# Patient Record
Sex: FEMALE | Race: BLACK OR AFRICAN AMERICAN | Employment: OTHER | URBAN - METROPOLITAN AREA
[De-identification: names, ages, dates, MRNs, and addresses within clinical notes are randomized per-mention and may not be internally consistent; named-entity substitution may affect disease eponyms.]

---

## 2017-03-13 ENCOUNTER — TELEPHONE (OUTPATIENT)
Dept: FAMILY MEDICINE CLINIC | Age: 67
End: 2017-03-13

## 2017-03-13 DIAGNOSIS — M79.605 BILATERAL LEG PAIN: Primary | ICD-10-CM

## 2017-03-13 DIAGNOSIS — M79.604 BILATERAL LEG PAIN: Primary | ICD-10-CM

## 2017-03-29 ENCOUNTER — OFFICE VISIT (OUTPATIENT)
Dept: FAMILY MEDICINE CLINIC | Age: 67
End: 2017-03-29

## 2017-03-29 VITALS
HEIGHT: 65 IN | RESPIRATION RATE: 18 BRPM | TEMPERATURE: 98.6 F | WEIGHT: 217 LBS | SYSTOLIC BLOOD PRESSURE: 112 MMHG | HEART RATE: 95 BPM | BODY MASS INDEX: 36.15 KG/M2 | DIASTOLIC BLOOD PRESSURE: 71 MMHG | OXYGEN SATURATION: 96 %

## 2017-03-29 DIAGNOSIS — E11.9 CONTROLLED TYPE 2 DIABETES MELLITUS WITHOUT COMPLICATION, WITHOUT LONG-TERM CURRENT USE OF INSULIN (HCC): ICD-10-CM

## 2017-03-29 DIAGNOSIS — F51.01 PRIMARY INSOMNIA: ICD-10-CM

## 2017-03-29 DIAGNOSIS — I10 ESSENTIAL HYPERTENSION: ICD-10-CM

## 2017-03-29 DIAGNOSIS — M54.50 BILATERAL LOW BACK PAIN WITHOUT SCIATICA, UNSPECIFIED CHRONICITY: ICD-10-CM

## 2017-03-29 DIAGNOSIS — M19.90 ARTHRITIS: ICD-10-CM

## 2017-03-29 DIAGNOSIS — G89.29 CHRONIC PAIN OF BOTH KNEES: Primary | ICD-10-CM

## 2017-03-29 DIAGNOSIS — M25.562 CHRONIC PAIN OF BOTH KNEES: Primary | ICD-10-CM

## 2017-03-29 DIAGNOSIS — M25.561 CHRONIC PAIN OF BOTH KNEES: Primary | ICD-10-CM

## 2017-03-29 RX ORDER — CLONAZEPAM 1 MG/1
0.5 TABLET ORAL
Qty: 30 TAB | Refills: 0 | Status: SHIPPED | OUTPATIENT
Start: 2017-03-29 | End: 2017-03-29

## 2017-03-29 RX ORDER — PREDNISONE 10 MG/1
TABLET ORAL
Qty: 20 TAB | Refills: 0 | Status: SHIPPED | OUTPATIENT
Start: 2017-03-29 | End: 2017-03-29 | Stop reason: SDUPTHER

## 2017-03-29 RX ORDER — METFORMIN HYDROCHLORIDE 500 MG/1
TABLET ORAL
Qty: 90 TAB | Refills: 3 | Status: SHIPPED | OUTPATIENT
Start: 2017-03-29 | End: 2018-05-23 | Stop reason: SDUPTHER

## 2017-03-29 RX ORDER — PREDNISONE 10 MG/1
TABLET ORAL
Qty: 20 TAB | Refills: 0 | Status: SHIPPED | OUTPATIENT
Start: 2017-03-29 | End: 2017-11-03 | Stop reason: ALTCHOICE

## 2017-03-29 RX ORDER — LIDOCAINE 50 MG/G
PATCH TOPICAL
Qty: 30 EACH | Refills: 3 | Status: SHIPPED | OUTPATIENT
Start: 2017-03-29 | End: 2018-03-13

## 2017-03-29 RX ORDER — LISINOPRIL 10 MG/1
TABLET ORAL
Qty: 90 TAB | Refills: 1 | Status: SHIPPED | OUTPATIENT
Start: 2017-03-29 | End: 2018-01-02 | Stop reason: SDUPTHER

## 2017-03-29 RX ORDER — TRIAMTERENE AND HYDROCHLOROTHIAZIDE 37.5; 25 MG/1; MG/1
1 CAPSULE ORAL DAILY
Qty: 90 CAP | Refills: 1 | Status: SHIPPED | OUTPATIENT
Start: 2017-03-29 | End: 2018-01-02 | Stop reason: SDUPTHER

## 2017-03-29 RX ORDER — GABAPENTIN 300 MG/1
300 CAPSULE ORAL 2 TIMES DAILY
Qty: 60 CAP | Refills: 3 | Status: SHIPPED | OUTPATIENT
Start: 2017-03-29 | End: 2017-06-08 | Stop reason: SDUPTHER

## 2017-03-29 RX ORDER — DICLOFENAC SODIUM 75 MG/1
75 TABLET, DELAYED RELEASE ORAL 2 TIMES DAILY
Qty: 60 TAB | Refills: 3 | Status: SHIPPED | OUTPATIENT
Start: 2017-03-29 | End: 2017-06-08 | Stop reason: SDUPTHER

## 2017-03-29 NOTE — MR AVS SNAPSHOT
Visit Information Date & Time Provider Department Dept. Phone Encounter #  
 3/29/2017 10:45 AM Tapan Marc MD [facility] [address] [phone] [id number] Follow-up Instructions Return in about 3 months (around 6/29/2017). Upcoming Health Maintenance Date Due  
 EYE EXAM RETINAL OR DILATED Q1 5/28/1960 FOBT Q 1 YEAR AGE 50-75 11/30/2012 LIPID PANEL Q1 11/20/2016 FOOT EXAM Q1 4/29/2017 MICROALBUMIN Q1 4/29/2017 GLAUCOMA SCREENING Q2Y 5/29/2017 HEMOGLOBIN A1C Q6M 5/14/2017 MEDICARE YEARLY EXAM 7/28/2017 BREAST CANCER SCRN MAMMOGRAM 12/19/2018 DTaP/Tdap/Td series (2 - Td) 6/25/2023 Allergies as of 3/29/2017  Review Complete On: 3/29/2017 By: Tapan Marc MD  
  
 Severity Noted Reaction Type Reactions Codeine Medium 05/27/2015    Other (comments) \"Didn't like the way it made me feel\" Lipitor [Atorvastatin] Medium 05/27/2015    Other (comments)  
 cramps Pcn [Penicillins] Medium 05/27/2015    Itching Current Immunizations  Reviewed on 11/23/2015 Name Date Influenza Vaccine 11/20/2015, 11/13/2013 Influenza Vaccine Rajesh Bender) 11/14/2016 Not reviewed this visit You Were Diagnosed With   
  
 Codes Comments Chronic pain of both knees    -  Primary ICD-10-CM: M25.561, M25.562, G89.29 ICD-9-CM: 719.46, 338.29 Primary insomnia     ICD-10-CM: F51.01 
ICD-9-CM: 307.42 Essential hypertension     ICD-10-CM: I10 
ICD-9-CM: 401.9 Bilateral low back pain without sciatica, unspecified chronicity     ICD-10-CM: M54.5 ICD-9-CM: 724.2 Arthritis     ICD-10-CM: M19.90 ICD-9-CM: 716.90 Controlled type 2 diabetes mellitus without complication, without long-term current use of insulin ([facility]     ICD-10-CM: E11.9 ICD-9-CM: 250.00 Vitals BP Pulse Temp Resp Height(growth percentile) Weight(growth percentile) 112/71 95 98.6 °F (37 °C) (Oral) 18 5' 5\" (1.651 m) 217 lb (98.4 kg) SpO2 BMI OB Status Smoking Status 96% 36.11 kg/m2 Postmenopausal Former Smoker BMI and BSA Data Body Mass Index Body Surface Area  
 36.11 kg/m 2 2.12 m 2 Preferred Pharmacy Pharmacy Name Phone [place], [place] - [address] [address] [place] [address] [phone] Your Updated Medication List  
  
   
This list is accurate as of: 3/29/17 11:41 AM.  Always use your most recent med list.  
  
  
  
  
 aspirin 81 mg chewable tablet Take 81 mg by mouth daily. clobetasol 0.05 % external solution Commonly known as:  Aquilino Congress Apply small amount to scalp once daily  
  
 diclofenac EC 75 mg EC tablet Commonly known as:  VOLTAREN Take 1 Tab by mouth two (2) times a day.  
  
 gabapentin 300 mg capsule Commonly known as:  NEURONTIN Take 1 Cap by mouth two (2) times a day. lidocaine 5 % Commonly known as:  Bard Budd Apply patch to the affected area for 12 hours a day and remove for 12 hours a day. lisinopril 10 mg tablet Commonly known as:  PRINIVIL, ZESTRIL  
TAKE 1 TABLET EVERY DAY  
  
 metFORMIN 500 mg tablet Commonly known as:  GLUCOPHAGE  
TAKE 1 TABLET DAILY WITH MEALS MILK OF MAGNESIA PO  
  
 OTHER  
lidocain 5% and gabapentin 5%  
  
 predniSONE 10 mg tablet Commonly known as:  Maximiliano Moment Take 4 tab ea day for 2 days, then 3 tab ea day for 2 days , then 2 tab ea day for 2 days, then 1 tab ea day for 2 days  
  
 triamterene-hydroCHLOROthiazide 37.5-25 mg per capsule Commonly known as:  Lisa Сергей Take 1 Cap by mouth daily. VITAMIN D3 2,000 unit Cap capsule Generic drug:  Cholecalciferol (Vitamin D3) Take 1 Cap by mouth daily. Prescriptions Printed Refills  
 predniSONE (DELTASONE) 10 mg tablet 0 Sig: Take 4 tab ea day for 2 days, then 3 tab ea day for 2 days , then 2 tab ea day for 2 days, then 1 tab ea day for 2 days Class: Print Prescriptions Sent to Pharmacy Refills  
 triamterene-hydroCHLOROthiazide (DYAZIDE) 37.5-25 mg per capsule 1 Sig: Take 1 Cap by mouth daily. Class: Normal  
 Pharmacy: [address] [place] [address] Ph #: [phone] Route: Oral  
 diclofenac EC (VOLTAREN) 75 mg EC tablet 3 Sig: Take 1 Tab by mouth two (2) times a day. Class: Normal  
 Pharmacy: [address] [place] [address] Ph #: [phone] Route: Oral  
 gabapentin (NEURONTIN) 300 mg capsule 3 Sig: Take 1 Cap by mouth two (2) times a day. Class: Normal  
 Pharmacy: [address] [place] [address] Ph #: [phone] Route: Oral  
 lidocaine (LIDODERM) 5 % 3 Sig: Apply patch to the affected area for 12 hours a day and remove for 12 hours a day. Class: Normal  
 Pharmacy: [address] [place] [address] Ph #: [phone]  
 lisinopril (PRINIVIL, ZESTRIL) 10 mg tablet 1 Sig: TAKE 1 TABLET EVERY DAY Class: Normal  
 Pharmacy: [address] [place] [address] Ph #: [phone]  
 metFORMIN (GLUCOPHAGE) 500 mg tablet 3 Sig: TAKE 1 TABLET DAILY WITH MEALS Class: Normal  
 Pharmacy: [address] [place] [address] Ph #: [phone] We Performed the Following REFERRAL TO ORTHOPEDICS [EOO821 Custom] Comments:  
 Bilateral mod OA and c/o constant pain, eval and treat Follow-up Instructions Return in about 3 months (around 6/29/2017). Referral Information Referral ID Referred By Referred To  
  
 [id number] Juliana Pollock [address] Phone: [phone] Fax: [phone] Visits Status Start Date End Date 1 New Request 3/29/17 3/29/18  If your referral has a status of pending review or denied, additional information will be sent to support the outcome of this decision. Patient Instructions Patellofemoral Pain Syndrome (Runner's Knee): Exercises Your Care Instructions Here are some examples of typical rehabilitation exercises for your condition. Start each exercise slowly. Ease off the exercise if you start to have pain. Your doctor or physical therapist will tell you when you can start these exercises and which ones will work best for you. How to do the exercises Calf wall stretch 1. Stand facing a wall with your hands on the wall at about eye level. Put your affected leg about a step behind your other leg. 2. Keeping your back leg straight and your back heel on the floor, bend your front knee and gently bring your hip and chest toward the wall until you feel a stretch in the calf of your back leg. 3. Hold the stretch for at least 15 to 30 seconds. 4. Repeat 2 to 4 times. 5. Repeat steps 1 through 4, but this time keep your back knee bent. Quadriceps stretch 1. If you are not steady on your feet, hold on to a chair, counter, or wall. 2. Bend your affected leg, and reach behind you to grab the front of your foot or ankle with the hand on the same side. For example, if you are stretching your right leg, use your right hand. 3. Keeping your knees next to each other, pull your foot toward your buttock until you feel a gentle stretch across the front of your hip and down the front of your thigh. Your knee should be pointed directly to the ground, and not out to the side. 4. Hold the stretch for at least 15 to 30 seconds. 5. Repeat 2 to 4 times. Hamstring wall stretch 1. Lie on your back in a doorway, with your good leg through the open door. 2. Slide your affected leg up the wall to straighten your knee. You should feel a gentle stretch down the back of your leg. ¨ Do not arch your back. ¨ Do not bend either knee. ¨ Keep one heel touching the floor and the other heel touching the wall. Do not point your toes. 3. Hold the stretch for at least 1 minute. Then over time, try to lengthen the time you hold the stretch to as long as 6 minutes. 4. Repeat 2 to 4 times. If you do not have a place to do this exercise in a doorway, there is another way to do it: 1. Lie on your back, and bend your affected leg. 2. Loop a towel under the ball and toes of that foot, and hold the ends of the towel in your hands. 3. Straighten your knee, and slowly pull back on the towel. You should feel a gentle stretch down the back of your leg. 4. Hold the stretch for at least 15 to 30 seconds. Or even better, hold the stretch for 1 minute if you can. 5. Repeat 2 to 4 times. [employer] 1. Sit with your affected leg straight and supported on the floor or a firm bed. Place a small, rolled-up towel under your affected knee. Your other leg should be bent, with that foot flat on the floor. 2. Tighten the thigh muscles of your affected leg by pressing the back of your knee down into the towel. 3. Hold for about 6 seconds, then rest for up to 10 seconds. 4. Repeat 8 to 12 times. Straight-leg raises to the front 1. Lie on your back with your good knee bent so that your foot rests flat on the floor. Your affected leg should be straight. Make sure that your low back has a normal curve. You should be able to slip your hand in between the floor and the small of your back, with your palm touching the floor and your back touching the back of your hand. 2. Tighten the thigh muscles in your affected leg by pressing the back of your knee flat down to the floor. Hold your knee straight. 3. Keeping the thigh muscles tight and your leg straight, lift your affected leg up so that your heel is about 12 inches off the floor. 4. Hold for about 6 seconds, then lower your leg slowly. Rest for up to 10 seconds between repetitions. 5. Repeat 8 to 12 times. Straight-leg raises to the back 1. Lie on your stomach, and lift your leg straight up behind you (toward the ceiling). 2. Lift your toes about 6 inches off the floor, hold for about 6 seconds, then lower slowly. 3. Do 8 to 12 repetitions. Wall slide with ball squeeze 1. Stand with your back against a wall and with your feet about shoulder-width apart. Your feet should be about 12 inches away from the wall. 2. Put a ball about the size of a soccer ball between your knees. Then slowly slide down the wall until your knees are bent about 20 to 30 degrees. 3. Tighten your thigh muscles by squeezing the ball between your knees. Hold that position for about 10 seconds, then stop squeezing. Rest for up to 10 seconds between repetitions. 4. Repeat 8 to 12 times. Follow-up care is a key part of your treatment and safety. Be sure to make and go to all appointments, and call your doctor if you are having problems. It's also a good idea to know your test results and keep a list of the medicines you take. Where can you learn more? Go to http://ora-claude.info/. Enter A404 in the search box to learn more about \"Patellofemoral Pain Syndrome (Runner's Knee): Exercises. \" Current as of: May 23, 2016 Content Version: 11.2 © 1133-2834 [employer]. Care instructions adapted under license by [employer] (which disclaims liability or warranty for this information). If you have questions about a medical condition or this instruction, always ask your healthcare professional. [place] [address] any warranty or liability for your use of this information. Introducing [facility]! [facility] introduces [employer] patient portal. Now you can access parts of your medical record, email your doctor's office, and request medication refills online. 1. In your internet browser, go to [email or web address] 2. Click on the First Time User? Click Here link in the Sign In box. You will see the New Member Sign Up page. 3. Enter your goOutMap Access Code exactly as it appears below. You will not need to use this code after youve completed the sign-up process. If you do not sign up before the expiration date, you must request a new code. · goOutMap Access Code: [email or web address] Expires: 6/27/2017 11:08 AM 
 
4. Enter the last four digits of your Social Security Number (xxxx) and Date of Birth (mm/dd/yyyy) as indicated and click Submit. You will be taken to the next sign-up page. 5. Create a goOutMap ID. This will be your goOutMap login ID and cannot be changed, so think of one that is secure and easy to remember. 6. Create a goOutMap password. You can change your password at any time. 7. Enter your Password Reset Question and Answer. This can be used at a later time if you forget your password. 8. Enter your e-mail address. You will receive e-mail notification when new information is available in [address] 9. Click Sign Up. You can now view and download portions of your medical record. 10. Click the Download Summary menu link to download a portable copy of your medical information. If you have questions, please visit the Frequently Asked Questions section of the goOutMap website. Remember, goOutMap is NOT to be used for urgent needs. For medical emergencies, dial 911. Now available from your iPhone and Android! Please provide this summary of care documentation to your next provider. Your primary care clinician is listed as Hedwig Levels. If you have any questions after today's visit, please call [phone].

## 2017-03-29 NOTE — PROGRESS NOTES
1. Have you been to the ER, urgent care clinic since your last visit? Hospitalized since your last visit? No    2. Have you seen or consulted any other health care providers outside of the [address] [place] [address] since your last visit? Include any pap smears or colon screening.  No   Chief Complaint   Patient presents with    Leg Pain     lower legs on the side day and night, no meds help    Knee Pain

## 2017-03-29 NOTE — PROGRESS NOTES
Chief Complaint   Patient presents with    Leg Pain     lower legs on the side day and night, no meds help    Knee Pain     she is a 78y.o. year old female who presents for evalution. Bilateral knee pain right now the left worse thwn the right,. Made worse by walking or standing. Sometimes gets up to 10/10   at night when in bed  Described as a throbbing ache. The diclofenac helps a little but not taking the gabapentin at all. Dm  Blood sugar at home averaging low 100s. HTN  Taking meds and no problem with side effects  Reviewed PmHx, RxHx, FmHx, SocHx, AllgHx and updated and dated in the chart. Patient Active Problem List    Diagnosis    Controlled type 2 diabetes mellitus without complication, without long-term current use of insulin ([address]    Diabetes mellitus type 2, controlled ([address]    Arthritis    Hypercholesteremia    Essential hypertension    Insomnia    Hx of diabetes mellitus       Nurse notes were reviewed and copied and are correct  Review of Systems - negative except as listed above in the HPI    Objective:     Vitals:    03/29/17 1059   BP: 112/71   Pulse: 95   Resp: 18   Temp: 98.6 °F (37 °C)   TempSrc: Oral   SpO2: 96%   Weight: 217 lb (98.4 kg)   Height: 5' 5\" (1.651 m)        Physical Examination: General appearance - alert, well appearing, and in no distress  Mental status - alert, oriented to person, place, and time  Chest - clear to auscultation, no wheezes, rales or rhonchi, symmetric air entry  Heart - normal rate, regular rhythm, normal S1, S2, no murmurs, rubs, clicks or gallops  Musculoskeletal - no joint tenderness, deformity or swelling, pos crepitus in both knees      Assessment/ Plan:   Amy Helm was seen today for leg pain and knee pain. Diagnoses and all orders for this visit:    Chronic pain of both knees  -     REFERRAL TO ORTHOPEDICS  -     predniSONE (DELTASONE) 10 mg tablet;  Take 4 tab ea day for 2 days, then 3 tab ea day for 2 days , then 2 tab ea day for 2 days, then 1 tab ea day for 2 days    Primary insomnia  -  Gabapentin will help sleep    Essential hypertension  -     triamterene-hydroCHLOROthiazide (DYAZIDE) 37.5-25 mg per capsule; Take 1 Cap by mouth daily. -     lisinopril (PRINIVIL, ZESTRIL) 10 mg tablet; TAKE 1 TABLET EVERY DAY    Bilateral low back pain without sciatica, unspecified chronicity  -     diclofenac EC (VOLTAREN) 75 mg EC tablet; Take 1 Tab by mouth two (2) times a day. -     lidocaine (LIDODERM) 5 %; Apply patch to the affected area for 12 hours a day and remove for 12 hours a day. Arthritis  -     gabapentin (NEURONTIN) 300 mg capsule; Take 1 Cap by mouth two (2) times a day. Controlled type 2 diabetes mellitus without complication, without long-term current use of insulin (H[facility])  -     metFORMIN (GLUCOPHAGE) 500 mg tablet; TAKE 1 TABLET DAILY WITH MEALS       Follow-up Disposition:  Return in about 3 months (around 6/29/2017). ICD-10-CM ICD-9-CM    1. Chronic pain of both knees M25.561 719.46 REFERRAL TO ORTHOPEDICS    M25.562 338. 29 predniSONE (DELTASONE) 10 mg tablet    G89.29  DISCONTINUED: predniSONE (DELTASONE) 10 mg tablet      DISCONTINUED: predniSONE (DELTASONE) 10 mg tablet   2. Primary insomnia F51.01 307.42 DISCONTINUED: clonazePAM (KLONOPIN) 1 mg tablet   3. Essential hypertension I10 401.9 triamterene-hydroCHLOROthiazide (DYAZIDE) 37.5-25 mg per capsule      lisinopril (PRINIVIL, ZESTRIL) 10 mg tablet   4. Bilateral low back pain without sciatica, unspecified chronicity M54.5 724.2 diclofenac EC (VOLTAREN) 75 mg EC tablet      lidocaine (LIDODERM) 5 %   5. Arthritis M19.90 716.90 gabapentin (NEURONTIN) 300 mg capsule   6. Controlled type 2 diabetes mellitus without complication, without long-term current use of insulin (HCC) E11.9 250.00 metFORMIN (GLUCOPHAGE) 500 mg tablet       I have discussed the diagnosis with the patient and the intended plan as seen in the above orders.   The patient has received an after-visit summary and questions were answered concerning future plans. Medication Side Effects and Warnings were discussed with patient: yes  Patient Labs were reviewed and or requested: yes    Patient Past Records were reviewed and or requested: yes        Patient Instructions        Patellofemoral Pain Syndrome (Runner's Knee): Exercises  Your Care Instructions  Here are some examples of typical rehabilitation exercises for your condition. Start each exercise slowly. Ease off the exercise if you start to have pain. Your doctor or physical therapist will tell you when you can start these exercises and which ones will work best for you. How to do the exercises  Calf wall stretch    1. Stand facing a wall with your hands on the wall at about eye level. Put your affected leg about a step behind your other leg. 2. Keeping your back leg straight and your back heel on the floor, bend your front knee and gently bring your hip and chest toward the wall until you feel a stretch in the calf of your back leg. 3. Hold the stretch for at least 15 to 30 seconds. 4. Repeat 2 to 4 times. 5. Repeat steps 1 through 4, but this time keep your back knee bent. Quadriceps stretch    1. If you are not steady on your feet, hold on to a chair, counter, or wall. 2. Bend your affected leg, and reach behind you to grab the front of your foot or ankle with the hand on the same side. For example, if you are stretching your right leg, use your right hand. 3. Keeping your knees next to each other, pull your foot toward your buttock until you feel a gentle stretch across the front of your hip and down the front of your thigh. Your knee should be pointed directly to the ground, and not out to the side. 4. Hold the stretch for at least 15 to 30 seconds. 5. Repeat 2 to 4 times. Hamstring wall stretch    1. Lie on your back in a doorway, with your good leg through the open door. 2. Slide your affected leg up the wall to straighten your knee. You should feel a gentle stretch down the back of your leg. ¨ Do not arch your back. ¨ Do not bend either knee. ¨ Keep one heel touching the floor and the other heel touching the wall. Do not point your toes. 3. Hold the stretch for at least 1 minute. Then over time, try to lengthen the time you hold the stretch to as long as 6 minutes. 4. Repeat 2 to 4 times. If you do not have a place to do this exercise in a doorway, there is another way to do it:  1. Lie on your back, and bend your affected leg. 2. Loop a towel under the ball and toes of that foot, and hold the ends of the towel in your hands. 3. Straighten your knee, and slowly pull back on the towel. You should feel a gentle stretch down the back of your leg. 4. Hold the stretch for at least 15 to 30 seconds. Or even better, hold the stretch for 1 minute if you can. 5. Repeat 2 to 4 times. Quad sets    1. Sit with your affected leg straight and supported on the floor or a firm bed. Place a small, rolled-up towel under your affected knee. Your other leg should be bent, with that foot flat on the floor. 2. Tighten the thigh muscles of your affected leg by pressing the back of your knee down into the towel. 3. Hold for about 6 seconds, then rest for up to 10 seconds. 4. Repeat 8 to 12 times. Straight-leg raises to the front    1. Lie on your back with your good knee bent so that your foot rests flat on the floor. Your affected leg should be straight. Make sure that your low back has a normal curve. You should be able to slip your hand in between the floor and the small of your back, with your palm touching the floor and your back touching the back of your hand. 2. Tighten the thigh muscles in your affected leg by pressing the back of your knee flat down to the floor. Hold your knee straight. 3. Keeping the thigh muscles tight and your leg straight, lift your affected leg up so that your heel is about 12 inches off the floor.   4. Hold for about 6 seconds, then lower your leg slowly. Rest for up to 10 seconds between repetitions. 5. Repeat 8 to 12 times. Straight-leg raises to the back    1. Lie on your stomach, and lift your leg straight up behind you (toward the ceiling). 2. Lift your toes about 6 inches off the floor, hold for about 6 seconds, then lower slowly. 3. Do 8 to 12 repetitions. Wall slide with ball squeeze    1. Stand with your back against a wall and with your feet about shoulder-width apart. Your feet should be about 12 inches away from the wall. 2. Put a ball about the size of a soccer ball between your knees. Then slowly slide down the wall until your knees are bent about 20 to 30 degrees. 3. Tighten your thigh muscles by squeezing the ball between your knees. Hold that position for about 10 seconds, then stop squeezing. Rest for up to 10 seconds between repetitions. 4. Repeat 8 to 12 times. Follow-up care is a key part of your treatment and safety. Be sure to make and go to all appointments, and call your doctor if you are having problems. It's also a good idea to know your test results and keep a list of the medicines you take. Where can you learn more? Go to http://ora-claude.info/. Enter A404 in the search box to learn more about \"Patellofemoral Pain Syndrome (Runner's Knee): Exercises. \"  Current as of: May 23, 2016  Content Version: 11.2  © 5980-1805 [employer]. Care instructions adapted under license by [employer] (which disclaims liability or warranty for this information). If you have questions about a medical condition or this instruction, always ask your healthcare professional. [place] [address] any warranty or liability for your use of this information.         The patient verbalizes understanding and agrees with the plan of care        Patient has the advanced directives booklet to review

## 2017-03-29 NOTE — PATIENT INSTRUCTIONS
Patellofemoral Pain Syndrome (Runner's Knee): Exercises  Your Care Instructions  Here are some examples of typical rehabilitation exercises for your condition. Start each exercise slowly. Ease off the exercise if you start to have pain. Your doctor or physical therapist will tell you when you can start these exercises and which ones will work best for you. How to do the exercises  Calf wall stretch    1. Stand facing a wall with your hands on the wall at about eye level. Put your affected leg about a step behind your other leg. 2. Keeping your back leg straight and your back heel on the floor, bend your front knee and gently bring your hip and chest toward the wall until you feel a stretch in the calf of your back leg. 3. Hold the stretch for at least 15 to 30 seconds. 4. Repeat 2 to 4 times. 5. Repeat steps 1 through 4, but this time keep your back knee bent. Quadriceps stretch    1. If you are not steady on your feet, hold on to a chair, counter, or wall. 2. Bend your affected leg, and reach behind you to grab the front of your foot or ankle with the hand on the same side. For example, if you are stretching your right leg, use your right hand. 3. Keeping your knees next to each other, pull your foot toward your buttock until you feel a gentle stretch across the front of your hip and down the front of your thigh. Your knee should be pointed directly to the ground, and not out to the side. 4. Hold the stretch for at least 15 to 30 seconds. 5. Repeat 2 to 4 times. Hamstring wall stretch    1. Lie on your back in a doorway, with your good leg through the open door. 2. Slide your affected leg up the wall to straighten your knee. You should feel a gentle stretch down the back of your leg. ¨ Do not arch your back. ¨ Do not bend either knee. ¨ Keep one heel touching the floor and the other heel touching the wall. Do not point your toes. 3. Hold the stretch for at least 1 minute.  Then over time, try to lengthen the time you hold the stretch to as long as 6 minutes. 4. Repeat 2 to 4 times. If you do not have a place to do this exercise in a doorway, there is another way to do it:  1. Lie on your back, and bend your affected leg. 2. Loop a towel under the ball and toes of that foot, and hold the ends of the towel in your hands. 3. Straighten your knee, and slowly pull back on the towel. You should feel a gentle stretch down the back of your leg. 4. Hold the stretch for at least 15 to 30 seconds. Or even better, hold the stretch for 1 minute if you can. 5. Repeat 2 to 4 times. Quad sets    1. Sit with your affected leg straight and supported on the floor or a firm bed. Place a small, rolled-up towel under your affected knee. Your other leg should be bent, with that foot flat on the floor. 2. Tighten the thigh muscles of your affected leg by pressing the back of your knee down into the towel. 3. Hold for about 6 seconds, then rest for up to 10 seconds. 4. Repeat 8 to 12 times. Straight-leg raises to the front    1. Lie on your back with your good knee bent so that your foot rests flat on the floor. Your affected leg should be straight. Make sure that your low back has a normal curve. You should be able to slip your hand in between the floor and the small of your back, with your palm touching the floor and your back touching the back of your hand. 2. Tighten the thigh muscles in your affected leg by pressing the back of your knee flat down to the floor. Hold your knee straight. 3. Keeping the thigh muscles tight and your leg straight, lift your affected leg up so that your heel is about 12 inches off the floor. 4. Hold for about 6 seconds, then lower your leg slowly. Rest for up to 10 seconds between repetitions. 5. Repeat 8 to 12 times. Straight-leg raises to the back    1. Lie on your stomach, and lift your leg straight up behind you (toward the ceiling).   2. Lift your toes about 6 inches off the floor, hold for about 6 seconds, then lower slowly. 3. Do 8 to 12 repetitions. Wall slide with ball squeeze    1. Stand with your back against a wall and with your feet about shoulder-width apart. Your feet should be about 12 inches away from the wall. 2. Put a ball about the size of a soccer ball between your knees. Then slowly slide down the wall until your knees are bent about 20 to 30 degrees. 3. Tighten your thigh muscles by squeezing the ball between your knees. Hold that position for about 10 seconds, then stop squeezing. Rest for up to 10 seconds between repetitions. 4. Repeat 8 to 12 times. Follow-up care is a key part of your treatment and safety. Be sure to make and go to all appointments, and call your doctor if you are having problems. It's also a good idea to know your test results and keep a list of the medicines you take. Where can you learn more? Go to http://ora-claude.info/. Enter A404 in the search box to learn more about \"Patellofemoral Pain Syndrome (Runner's Knee): Exercises. \"  Current as of: May 23, 2016  Content Version: 11.2  © 7776-2787 [employer]. Care instructions adapted under license by [employer] (which disclaims liability or warranty for this information). If you have questions about a medical condition or this instruction, always ask your healthcare professional. [place] [address] any warranty or liability for your use of this information.

## 2017-04-06 ENCOUNTER — TELEPHONE (OUTPATIENT)
Dept: FAMILY MEDICINE CLINIC | Age: 67
End: 2017-04-06

## 2017-04-06 NOTE — TELEPHONE ENCOUNTER
PT needs for you to call her about records she needs to get sent to Dr. Fabrice Dhillon for an appt on 4/13. Fax # [phone].  Please call her at [phone]

## 2017-04-12 ENCOUNTER — TELEPHONE (OUTPATIENT)
Dept: FAMILY MEDICINE CLINIC | Age: 67
End: 2017-04-12

## 2017-04-12 NOTE — TELEPHONE ENCOUNTER
Patient called has an appointment with Reyes Robertson tomorrow. Says office did not receive xrays that she was told had been sent. Called [facility] transferred to Dr. Tom Brandt. Informed her that we did send the requested information and to give us a call back if we need to have it sent again.

## 2017-04-21 ENCOUNTER — TELEPHONE (OUTPATIENT)
Dept: FAMILY MEDICINE CLINIC | Age: 67
End: 2017-04-21

## 2017-04-21 DIAGNOSIS — M54.9 BACK PAIN, UNSPECIFIED BACK LOCATION, UNSPECIFIED BACK PAIN LATERALITY, UNSPECIFIED CHRONICITY: Primary | ICD-10-CM

## 2017-05-05 ENCOUNTER — HOSPITAL ENCOUNTER (OUTPATIENT)
Dept: MRI IMAGING | Age: 67
Discharge: HOME | End: 2017-05-05
Attending: PHYSICAL MEDICINE & REHABILITATION
Payer: MEDICARE

## 2017-05-05 DIAGNOSIS — M48.061 SPINAL STENOSIS, LUMBAR REGION, WITHOUT NEUROGENIC CLAUDICATION: ICD-10-CM

## 2017-05-05 PROCEDURE — 72148 MRI LUMBAR SPINE W/O DYE: CPT

## 2017-06-08 DIAGNOSIS — M19.90 ARTHRITIS: ICD-10-CM

## 2017-06-08 DIAGNOSIS — M54.50 BILATERAL LOW BACK PAIN WITHOUT SCIATICA, UNSPECIFIED CHRONICITY: ICD-10-CM

## 2017-06-09 RX ORDER — GABAPENTIN 300 MG/1
CAPSULE ORAL
Qty: 180 CAP | Refills: 3 | Status: SHIPPED | OUTPATIENT
Start: 2017-06-09 | End: 2019-04-23 | Stop reason: SDUPTHER

## 2017-06-09 RX ORDER — DICLOFENAC SODIUM 75 MG/1
TABLET, DELAYED RELEASE ORAL
Qty: 180 TAB | Refills: 3 | Status: SHIPPED | OUTPATIENT
Start: 2017-06-09 | End: 2017-11-03 | Stop reason: ALTCHOICE

## 2017-07-10 ENCOUNTER — TELEPHONE (OUTPATIENT)
Dept: FAMILY MEDICINE CLINIC | Age: 67
End: 2017-07-10

## 2017-11-03 ENCOUNTER — OFFICE VISIT (OUTPATIENT)
Dept: FAMILY MEDICINE CLINIC | Age: 67
End: 2017-11-03

## 2017-11-03 VITALS
DIASTOLIC BLOOD PRESSURE: 85 MMHG | BODY MASS INDEX: 37.49 KG/M2 | HEIGHT: 65 IN | WEIGHT: 225 LBS | RESPIRATION RATE: 16 BRPM | SYSTOLIC BLOOD PRESSURE: 127 MMHG | TEMPERATURE: 98.5 F | OXYGEN SATURATION: 94 % | HEART RATE: 91 BPM

## 2017-11-03 DIAGNOSIS — Z23 ENCOUNTER FOR IMMUNIZATION: ICD-10-CM

## 2017-11-03 DIAGNOSIS — M25.50 MULTIPLE JOINT PAIN: Primary | ICD-10-CM

## 2017-11-03 DIAGNOSIS — I10 ESSENTIAL HYPERTENSION: ICD-10-CM

## 2017-11-03 RX ORDER — NAPROXEN 500 MG/1
500 TABLET ORAL 2 TIMES DAILY WITH MEALS
Qty: 60 TAB | Refills: 5 | Status: SHIPPED | OUTPATIENT
Start: 2017-11-03 | End: 2018-01-16 | Stop reason: ALTCHOICE

## 2017-11-03 RX ORDER — NAPROXEN 500 MG/1
500 TABLET ORAL 2 TIMES DAILY WITH MEALS
Qty: 180 TAB | Refills: 1 | Status: SHIPPED | OUTPATIENT
Start: 2017-11-03 | End: 2018-01-16 | Stop reason: ALTCHOICE

## 2017-11-03 NOTE — MR AVS SNAPSHOT
Visit Information Date & Time Provider Department Dept. Phone Encounter #  
 11/3/2017 10:15 AM Nestor Villeda MD [facility] [id number] Upcoming Health Maintenance Date Due  
 EYE EXAM RETINAL OR DILATED Q1 5/28/1960 FOBT Q 1 YEAR AGE 50-75 11/30/2012 LIPID PANEL Q1 11/20/2016 FOOT EXAM Q1 4/29/2017 MICROALBUMIN Q1 4/29/2017 HEMOGLOBIN A1C Q6M 5/14/2017 GLAUCOMA SCREENING Q2Y 5/29/2017 MEDICARE YEARLY EXAM 7/28/2017 BREAST CANCER SCRN MAMMOGRAM 12/19/2018 DTaP/Tdap/Td series (2 - Td) 6/25/2023 Allergies as of 11/3/2017  Review Complete On: 11/3/2017 By: Nestor Villeda MD  
  
 Severity Noted Reaction Type Reactions Codeine Medium 05/27/2015    Other (comments) \"Didn't like the way it made me feel\" Lipitor [Atorvastatin] Medium 05/27/2015    Other (comments)  
 cramps Pcn [Penicillins] Medium 05/27/2015    Itching Current Immunizations  Reviewed on 11/23/2015 Name Date Influenza Vaccine 11/20/2015, 11/13/2013 Influenza Vaccine Yessica Maulik) 11/14/2016 Influenza Vaccine (Quad) PF 11/3/2017 Not reviewed this visit You Were Diagnosed With   
  
 Codes Comments Multiple joint pain    -  Primary ICD-10-CM: M25.50 ICD-9-CM: 719.49 Essential hypertension     ICD-10-CM: I10 
ICD-9-CM: 401.9 Encounter for immunization     ICD-10-CM: X34 ICD-9-CM: V03.89 Vitals BP Pulse Temp Resp Height(growth percentile) Weight(growth percentile) 127/85 91 98.5 °F (36.9 °C) (Oral) 16 5' 5\" (1.651 m) 225 lb (102.1 kg) SpO2 BMI OB Status Smoking Status 94% 37.44 kg/m2 Postmenopausal Former Smoker Vitals History BMI and BSA Data Body Mass Index Body Surface Area  
 37.44 kg/m 2 2.16 m 2 Preferred Pharmacy Pharmacy Name Phone [address] [place] [address] [phone] Your Updated Medication List  
  
   
 This list is accurate as of: 11/3/17 11:48 AM.  Always use your most recent med list.  
  
  
  
  
 aspirin 81 mg chewable tablet Take 81 mg by mouth daily. clobetasol 0.05 % external solution Commonly known as:  Harrie Reagin Apply small amount to scalp once daily  
  
 gabapentin 300 mg capsule Commonly known as:  NEURONTIN  
TAKE 1 CAPSULE TWICE DAILY  
  
 lidocaine 5 % Commonly known as:  Rodríguez Isabella Apply patch to the affected area for 12 hours a day and remove for 12 hours a day. lisinopril 10 mg tablet Commonly known as:  PRINIVIL, ZESTRIL  
TAKE 1 TABLET EVERY DAY  
  
 metFORMIN 500 mg tablet Commonly known as:  GLUCOPHAGE  
TAKE 1 TABLET DAILY WITH MEALS MILK OF MAGNESIA PO  
  
 * naproxen 500 mg tablet Commonly known as:  NAPROSYN Take 1 Tab by mouth two (2) times daily (with meals). * naproxen 500 mg tablet Commonly known as:  NAPROSYN Take 1 Tab by mouth two (2) times daily (with meals). OTHER  
lidocain 5% and gabapentin 5%  
  
 triamterene-hydroCHLOROthiazide 37.5-25 mg per capsule Commonly known as:  Lavenia Butts Take 1 Cap by mouth daily. VITAMIN D3 2,000 unit Cap capsule Generic drug:  Cholecalciferol (Vitamin D3) Take 1 Cap by mouth daily. * Notice: This list has 2 medication(s) that are the same as other medications prescribed for you. Read the directions carefully, and ask your doctor or other care provider to review them with you. Prescriptions Sent to Pharmacy Refills  
 naproxen (NAPROSYN) 500 mg tablet 5 Sig: Take 1 Tab by mouth two (2) times daily (with meals). Class: Normal  
 Pharmacy: [facility]/pharmacy #[id number] - [place], [address] [facility] Ph #: [phone] Route: Oral  
 naproxen (NAPROSYN) 500 mg tablet 1 Sig: Take 1 Tab by mouth two (2) times daily (with meals). Class: Normal  
 Pharmacy: [address] [place] [address], [address] Ph #: [phone]  Route: Oral  
 We Performed the Following INFLUENZA VIRUS VAC QUAD,SPLIT,PRESV FREE SYRINGE IM [id number] CPT(R)] REFERRAL TO RHEUMATOLOGY [EVK52 Custom] Comments:  
 Multiple joint pain complaints, salvador and RA neg. eval and treat for other ideas for cause and treatment Referral Information Referral ID Referred By Referred To  
  
 [id number] Melinda Baker4 Katerina Anderson MD   
   [address], [address] Phone: [phone] Fax: [phone] Visits Status Start Date End Date 1 Authorized 11/3/17 11/3/18 If your referral has a status of pending review or denied, additional information will be sent to support the outcome of this decision. Introducing [facility]! [facility] introduces Peek patient portal. Now you can access parts of your medical record, email your doctor's office, and request medication refills online. 1. In your internet browser, go to [email or web address] 2. Click on the First Time User? Click Here link in the Sign In box. You will see the New Member Sign Up page. 3. Enter your Peek Access Code exactly as it appears below. You will not need to use this code after youve completed the sign-up process. If you do not sign up before the expiration date, you must request a new code. · Peek Access Code: [email or web address] Expires: 2/1/2018 10:52 AM 
 
4. Enter the last four digits of your Social Security Number (xxxx) and Date of Birth (mm/dd/yyyy) as indicated and click Submit. You will be taken to the next sign-up page. 5. Create a Uzabaset ID. This will be your Peek login ID and cannot be changed, so think of one that is secure and easy to remember. 6. Create a Uzabaset password. You can change your password at any time. 7. Enter your Password Reset Question and Answer. This can be used at a later time if you forget your password. 8. Enter your e-mail address.  You will receive e-mail notification when new information is available in Kuponjo. 9. Click Sign Up. You can now view and download portions of your medical record. 10. Click the Download Summary menu link to download a portable copy of your medical information. If you have questions, please visit the Frequently Asked Questions section of the Kuponjo website. Remember, Kuponjo is NOT to be used for urgent needs. For medical emergencies, dial 911. Now available from your iPhone and Android! Please provide this summary of care documentation to your next provider. Your primary care clinician is listed as [facility]. If you have any questions after today's visit, please call [phone].

## 2017-11-03 NOTE — PROGRESS NOTES
Chief Complaint   Patient presents with    Arthritis     she is a 75y.o. year old female who presents for evalution. Continues to c/o pain in the legs. The knees and the back hurts also  I have neg NCS, neg salvador and neg RA. Reviewed PmHx, RxHx, FmHx, SocHx, AllgHx and updated and dated in the chart. Aspirin yes __x__   No____ N/A____    Patient Active Problem List    Diagnosis    Controlled type 2 diabetes mellitus without complication, without long-term current use of insulin ([facility]    Diabetes mellitus type 2, controlled ([facility]    Arthritis    Hypercholesteremia    Essential hypertension    Insomnia    Hx of diabetes mellitus       Nurse notes were reviewed and copied and are correct  Review of Systems - negative except as listed above in the HPI    Objective:     Vitals:    11/03/17 1033   BP: 127/85   Pulse: 91   Resp: 16   Temp: 98.5 °F (36.9 °C)   TempSrc: Oral   SpO2: 94%   Weight: 225 lb (102.1 kg)   Height: 5' 5\" (1.651 m)       Physical Examination: General appearance - alert, well appearing, and in no distress  Mental status - alert, oriented to person, place, and time  Chest - clear to auscultation, no wheezes, rales or rhonchi, symmetric air entry  Heart - normal rate, regular rhythm, normal S1, S2, no murmurs, rubs, clicks or gallops  Musculoskeletal - no joint tenderness, deformity or swelling  Extremities - peripheral pulses normal, no pedal edema, no clubbing or cyanosis      Assessment/ Plan:   Diagnoses and all orders for this visit:    1. Multiple joint pain  -     naproxen (NAPROSYN) 500 mg tablet; Take 1 Tab by mouth two (2) times daily (with meals). -     REFERRAL TO RHEUMATOLOGY    2. Essential hypertension  Great control  3. Encounter for immunization  -     INFLUENZA VIRUS VACCINE QUADRIVALENT, PRESERVATIVE FREE SYRINGE ([id number])       Follow-up Disposition:  Return in about 3 months (around 2/3/2018), or if symptoms worsen or fail to improve. ICD-10-CM ICD-9-CM    1. Multiple joint pain M25.50 719.49 naproxen (NAPROSYN) 500 mg tablet      REFERRAL TO RHEUMATOLOGY      naproxen (NAPROSYN) 500 mg tablet   2. Essential hypertension I10 401.9    3. Encounter for immunization Z23 V03.89 INFLUENZA VIRUS VAC QUAD,SPLIT,PRESV FREE SYRINGE IM       I have discussed the diagnosis with the patient and the intended plan as seen in the above orders. The patient has received an after-visit summary and questions were answered concerning future plans. Medication Side Effects and Warnings were discussed with patient: yes  Patient Labs were reviewed and or requested: yes  Patient Past Records were reviewed and or requested: yes        There are no Patient Instructions on file for this visit.     The patient verbalizes understanding and agrees with the plan of care        Patient has the advanced directives booklet to review

## 2017-11-03 NOTE — PROGRESS NOTES
1. Have you been to the ER, urgent care clinic since your last visit? Hospitalized since your last visit? No    2. Have you seen or consulted any other health care providers outside of the [address] [place] [address] since your last visit? Include any pap smears or colon screening.  No     Chief Complaint   Patient presents with    Arthritis

## 2018-01-04 RX ORDER — ISOPROPYL ALCOHOL 70 ML/100ML
1 SWAB TOPICAL DAILY
Qty: 100 PAD | Refills: 3 | Status: SHIPPED | OUTPATIENT
Start: 2018-01-04 | End: 2021-03-04

## 2018-01-04 RX ORDER — BLOOD-GLUCOSE METER
EACH MISCELLANEOUS
Qty: 1 EACH | Refills: 0 | Status: SHIPPED | OUTPATIENT
Start: 2018-01-04 | End: 2021-03-04

## 2018-01-04 RX ORDER — BLOOD-GLUCOSE CONTROL, NORMAL
EACH MISCELLANEOUS
Qty: 1 PACKAGE | Refills: 3 | Status: SHIPPED | OUTPATIENT
Start: 2018-01-04 | End: 2020-10-27 | Stop reason: SDUPTHER

## 2018-01-16 ENCOUNTER — OFFICE VISIT (OUTPATIENT)
Dept: FAMILY MEDICINE CLINIC | Age: 68
End: 2018-01-16

## 2018-01-16 VITALS
HEART RATE: 89 BPM | BODY MASS INDEX: 38.15 KG/M2 | WEIGHT: 229 LBS | DIASTOLIC BLOOD PRESSURE: 83 MMHG | RESPIRATION RATE: 20 BRPM | OXYGEN SATURATION: 96 % | SYSTOLIC BLOOD PRESSURE: 132 MMHG | TEMPERATURE: 98.3 F | HEIGHT: 65 IN

## 2018-01-16 DIAGNOSIS — I10 ESSENTIAL HYPERTENSION: ICD-10-CM

## 2018-01-16 DIAGNOSIS — E11.40 TYPE 2 DIABETES MELLITUS WITH DIABETIC NEUROPATHY, WITHOUT LONG-TERM CURRENT USE OF INSULIN (HCC): ICD-10-CM

## 2018-01-16 DIAGNOSIS — M19.90 ARTHRITIS: Primary | ICD-10-CM

## 2018-01-16 RX ORDER — MELOXICAM 7.5 MG/1
7.5 TABLET ORAL DAILY
Qty: 30 TAB | Refills: 1 | Status: SHIPPED | OUTPATIENT
Start: 2018-01-16 | End: 2018-03-13

## 2018-01-16 NOTE — MR AVS SNAPSHOT
[address] [facility] [address] 
[phone] Patient: Albaro Anderson MRN: [id number] DF Visit Information Date & Time Provider Department Dept. Phone Encounter #  
 2018 11:00 AM Abel Humphreys  [facility] [phone] [id number] Follow-up Instructions Return in about 4 weeks (around 2018) for diabetes checkup. Your Appointments 3/13/2018  3:00 PM  
New Patient with Irish Chiang MD  
[facility] ([address]) Appt Note: NP, joint pain and arthritis, ref by Dr. Mindy Bang [phone]  
 [facility] [address]  
[phone]  
  
   
 [facility] [address] Upcoming Health Maintenance Date Due  
 EYE EXAM RETINAL OR DILATED Q1 1960 FOBT Q 1 YEAR AGE 50-75 2012 LIPID PANEL Q1 2016 FOOT EXAM Q1 2017 MICROALBUMIN Q1 2017 HEMOGLOBIN A1C Q6M 2017 GLAUCOMA SCREENING Q2Y 2017 MEDICARE YEARLY EXAM 2017 BREAST CANCER SCRN MAMMOGRAM 2018 DTaP/Tdap/Td series (2 - Td) 2023 Allergies as of 2018  Review Complete On: 2018 By: Amberly Jefferson Severity Noted Reaction Type Reactions Codeine Medium 2015    Other (comments) \"Didn't like the way it made me feel\" Lipitor [Atorvastatin] Medium 2015    Other (comments)  
 cramps Pcn [Penicillins] Medium 2015    Itching Current Immunizations  Reviewed on 2015 Name Date Influenza Vaccine 2015, 2013 Influenza Vaccine Lainey Humairaestly) 2016 Influenza Vaccine (Quad) PF 11/3/2017 Not reviewed this visit You Were Diagnosed With   
  
 Codes Comments Arthritis    -  Primary ICD-10-CM: M19.90 ICD-9-CM: 716.90   
 Type 2 diabetes mellitus with diabetic neuropathy, without long-term current use of insulin (HCC)     ICD-10-CM: E11.40 ICD-9-CM: 250.60, 357.2 Essential hypertension     ICD-10-CM: I10 
ICD-9-CM: 401.9 Vitals BP Pulse Temp Resp Height(growth percentile) Weight(growth percentile) 132/83 (BP 1 Location: Left arm, BP Patient Position: Sitting) 89 98.3 °F (36.8 °C) (Oral) 20 5' 5\" (1.651 m) 229 lb (103.9 kg) SpO2 BMI OB Status Smoking Status 96% 38.11 kg/m2 Postmenopausal Former Smoker Vitals History BMI and BSA Data Body Mass Index Body Surface Area  
 38.11 kg/m 2 2.18 m 2 Preferred Pharmacy Pharmacy Name Phone [id number], [address] [facility] [phone] Your Updated Medication List  
  
   
This list is accurate as of: 1/16/18 11:29 AM.  Always use your most recent med list.  
  
  
  
  
 alcohol swabs Padm Commonly known as:  BD Single Use Swabs Regular 1 Swab by Apply Externally route daily. aspirin 81 mg chewable tablet Take 81 mg by mouth daily. Blood-Glucose Meter Misc Commonly known as:  TRUE METRIX AIR GLUCOSE METER Use to test blood sugar once daily. Dx:E11.9 clobetasol 0.05 % external solution Commonly known as:  Kar Brash Apply small amount to scalp once daily  
  
 gabapentin 300 mg capsule Commonly known as:  NEURONTIN  
TAKE 1 CAPSULE TWICE DAILY  
  
 glucose blood VI test strips strip Commonly known as:  TRUE METRIX GLUCOSE TEST STRIP Use to check blood sugar once daily. Dx: E11.9  
  
 lancets 30 gauge Misc Commonly known as:  Shahana Coral Use to test blood sugar once daily. Dx:E11.9  
  
 lidocaine 5 % Commonly known as:  Benjiman Stacks Apply patch to the affected area for 12 hours a day and remove for 12 hours a day. lisinopril 10 mg tablet Commonly known as:  PRINIVIL, ZESTRIL  
TAKE 1 TABLET EVERY DAY  
  
 meloxicam 7.5 mg tablet Commonly known as:  MOBIC Take 1 Tab by mouth daily. metFORMIN 500 mg tablet Commonly known as:  GLUCOPHAGE  
TAKE 1 TABLET DAILY WITH MEALS MILK OF MAGNESIA PO  
  
 OTHER  
lidocain 5% and gabapentin 5%  
  
 triamterene-hydroCHLOROthiazide 37.5-25 mg per capsule Commonly known as:  Ottie Bethany TAKE 1 CAPSULE EVERY DAY  
  
 VITAMIN D3 2,000 unit Cap capsule Generic drug:  Cholecalciferol (Vitamin D3) Take 1 Cap by mouth daily. Prescriptions Sent to Pharmacy Refills  
 meloxicam (MOBIC) 7.5 mg tablet 1 Sig: Take 1 Tab by mouth daily. Class: Normal  
 Pharmacy: [id number] [place], [address] [facility] #: [phone] Route: Oral  
  
We Performed the Following REFERRAL TO RHEUMATOLOGY [TZP89 Custom] Comments:  
 goal widespread joint pain Follow-up Instructions Return in about 4 weeks (around 2/13/2018) for diabetes checkup. Referral Information Referral ID Referred By Referred To  
  
 [id number] Lulú Herrera MD   
   [address] [facility] Phone: [phone] Fax: [phone] Visits Status Start Date End Date 1 New Request 1/16/18 1/16/19 If your referral has a status of pending review or denied, additional information will be sent to support the outcome of this decision. Patient Instructions Arthritis: Care Instructions Your Care Instructions Arthritis, also called osteoarthritis, is a breakdown of the cartilage that cushions your joints. When the cartilage wears down, your bones rub against each other. This causes pain and stiffness. Many people have some arthritis as they age. Arthritis most often affects the joints of the spine, hands, hips, knees, or feet. You can take simple measures to protect your joints, ease your pain, and help you stay active. Follow-up care is a key part of your treatment and safety. Be sure to make and go to all appointments, and call your doctor if you are having problems. It's also a good idea to know your test results and keep a list of the medicines you take. How can you care for yourself at home? · Stay at a healthy weight. Being overweight puts extra strain on your joints. · Talk to your doctor or physical therapist about exercises that will help ease joint pain. ¨ Stretch. You may enjoy gentle forms of yoga to help keep your joints and muscles flexible. ¨ Walk instead of jog. Other types of exercise that are less stressful on the joints include riding a bicycle, swimming, stephen chi, or water exercise. ¨ Lift weights. Strong muscles help reduce stress on your joints. Stronger thigh muscles, for example, take some of the stress off of the knees and hips. Learn the right way to lift weights so you do not make joint pain worse. · Take your medicines exactly as prescribed. Call your doctor if you think you are having a problem with your medicine. · Take pain medicines exactly as directed. ¨ If the doctor gave you a prescription medicine for pain, take it as prescribed. ¨ If you are not taking a prescription pain medicine, ask your doctor if you can take an over-the-counter medicine. · Use a cane, crutch, walker, or another device if you need help to get around. These can help rest your joints. You also can use other things to make life easier, such as a higher toilet seat and padded handles on kitchen utensils. · Do not sit in low chairs, which can make it hard to get up. · Put heat or cold on your sore joints as needed. Use whichever helps you most. You also can take turns with hot and cold packs. ¨ Apply heat 2 or 3 times a day for 20 to 30 minutes-using a heating pad, hot shower, or hot pack-to relieve pain and stiffness.  
¨ Put ice or a cold pack on your sore joint for 10 to 20 minutes at a time. Put a thin cloth between the ice and your skin. When should you call for help? Call your doctor now or seek immediate medical care if: 
? · You have sudden swelling, warmth, or pain in any joint. ? · You have joint pain and a fever or rash. ? · You have such bad pain that you cannot use a joint. ? Watch closely for changes in your health, and be sure to contact your doctor if: 
? · You have mild joint symptoms that continue even with more than 6 weeks of care at home. ? · You have stomach pain or other problems with your medicine. Where can you learn more? Go to http://oraLoan Servicing Solutionsclaude.info/. Enter F344 in the search box to learn more about \"Arthritis: Care Instructions. \" Current as of: October 31, 2016 Content Version: 11.4 © 5902-3218 [employer]. Care instructions adapted under license by [employer] (formerly [employer]) (which disclaims liability or warranty for this information). If you have questions about a medical condition or this instruction, always ask your healthcare professional. [place] [address] any warranty or liability for your use of this information. DASH Diet: Care Instructions Your Care Instructions The DASH diet is an eating plan that can help lower your blood pressure. DASH stands for Dietary Approaches to Stop Hypertension. Hypertension is high blood pressure. The DASH diet focuses on eating foods that are high in calcium, potassium, and magnesium. These nutrients can lower blood pressure. The foods that are highest in these nutrients are fruits, vegetables, low-fat dairy products, nuts, seeds, and legumes. But taking calcium, potassium, and magnesium supplements instead of eating foods that are high in those nutrients does not have the same effect. The DASH diet also includes whole grains, fish, and poultry.  
The DASH diet is one of several lifestyle changes your doctor may recommend to lower your high blood pressure. Your doctor may also want you to decrease the amount of sodium in your diet. Lowering sodium while following the DASH diet can lower blood pressure even further than just the DASH diet alone. Follow-up care is a key part of your treatment and safety. Be sure to make and go to all appointments, and call your doctor if you are having problems. It's also a good idea to know your test results and keep a list of the medicines you take. How can you care for yourself at home? Following the DASH diet · Eat 4 to 5 servings of fruit each day. A serving is 1 medium-sized piece of fruit, ½ cup chopped or canned fruit, 1/4 cup dried fruit, or 4 ounces (½ cup) of fruit juice. Choose fruit more often than fruit juice. · Eat 4 to 5 servings of vegetables each day. A serving is 1 cup of lettuce or raw leafy vegetables, ½ cup of chopped or cooked vegetables, or 4 ounces (½ cup) of vegetable juice. Choose vegetables more often than vegetable juice. · Get 2 to 3 servings of low-fat and fat-free dairy each day. A serving is 8 ounces of milk, 1 cup of yogurt, or 1 ½ ounces of cheese. · Eat 6 to 8 servings of grains each day. A serving is 1 slice of bread, 1 ounce of dry cereal, or ½ cup of cooked rice, pasta, or cooked cereal. Try to choose whole-grain products as much as possible. · Limit lean meat, poultry, and fish to 2 servings each day. A serving is 3 ounces, about the size of a deck of cards. · Eat 4 to 5 servings of nuts, seeds, and legumes (cooked dried beans, lentils, and split peas) each week. A serving is 1/3 cup of nuts, 2 tablespoons of seeds, or ½ cup of cooked beans or peas. · Limit fats and oils to 2 to 3 servings each day. A serving is 1 teaspoon of vegetable oil or 2 tablespoons of salad dressing. · Limit sweets and added sugars to 5 servings or less a week. A serving is 1 tablespoon jelly or jam, ½ cup sorbet, or 1 cup of lemonade. · Eat less than 2,300 milligrams (mg) of sodium a day. If you limit your sodium to 1,500 mg a day, you can lower your blood pressure even more. Tips for success · Start small. Do not try to make dramatic changes to your diet all at once. You might feel that you are missing out on your favorite foods and then be more likely to not follow the plan. Make small changes, and stick with them. Once those changes become habit, add a few more changes. · Try some of the following: ¨ Make it a goal to eat a fruit or vegetable at every meal and at snacks. This will make it easy to get the recommended amount of fruits and vegetables each day. ¨ Try yogurt topped with fruit and nuts for a snack or healthy dessert. ¨ Add lettuce, tomato, cucumber, and onion to sandwiches. ¨ Combine a ready-made pizza crust with low-fat mozzarella cheese and lots of vegetable toppings. Try using tomatoes, squash, spinach, broccoli, carrots, cauliflower, and onions. ¨ Have a variety of cut-up vegetables with a low-fat dip as an appetizer instead of chips and dip. ¨ Sprinkle sunflower seeds or chopped almonds over salads. Or try adding chopped walnuts or almonds to cooked vegetables. ¨ Try some vegetarian meals using beans and peas. Add garbanzo or kidney beans to salads. Make burritos and tacos with mashed willis beans or black beans. Where can you learn more? Go to http://ora-claude.info/. Enter O124 in the search box to learn more about \"DASH Diet: Care Instructions. \" Current as of: September 21, 2016 Content Version: 11.4 © 1301-8231 [employer].. Care instructions adapted under license by [employer] (which disclaims liability or warranty for this information). If you have questions about a medical condition or this instruction, always ask your healthcare professional. [address] any warranty or liability for your use of this information. Introducing [facility]! [employer] introduces [employer] patient portal. Now you can access parts of your medical record, email your doctor's office, and request medication refills online. 1. In your internet browser, go to [email or web address]"/[email or web address] 2. Click on the First Time User? Click Here link in the Sign In box. You will see the New Member Sign Up page. 3. Enter your [employer] Access Code exactly as it appears below. You will not need to use this code after youve completed the sign-up process. If you do not sign up before the expiration date, you must request a new code. · [employer] Access Code: [email or web address] Expires: 2/1/2018  9:52 AM 
 
4. Enter the last four digits of your Social Security Number (xxxx) and Date of Birth (mm/dd/yyyy) as indicated and click Submit. You will be taken to the next sign-up page. 5. Create a [employer] ID. This will be your [employer] login ID and cannot be changed, so think of one that is secure and easy to remember. 6. Create a [employer] password. You can change your password at any time. 7. Enter your Password Reset Question and Answer. This can be used at a later time if you forget your password. 8. Enter your e-mail address. You will receive e-mail notification when new information is available in [address] 9. Click Sign Up. You can now view and download portions of your medical record. 10. Click the Download Summary menu link to download a portable copy of your medical information. If you have questions, please visit the Frequently Asked Questions section of the [employer] website. Remember, [employer] is NOT to be used for urgent needs. For medical emergencies, dial 911. Now available from your iPhone and Android! Please provide this summary of care documentation to your next provider. Your primary care clinician is listed as Denis John. If you have any questions after today's visit, please call [phone].

## 2018-01-16 NOTE — PATIENT INSTRUCTIONS
Arthritis: Care Instructions  Your Care Instructions  Arthritis, also called osteoarthritis, is a breakdown of the cartilage that cushions your joints. When the cartilage wears down, your bones rub against each other. This causes pain and stiffness. Many people have some arthritis as they age. Arthritis most often affects the joints of the spine, hands, hips, knees, or feet. You can take simple measures to protect your joints, ease your pain, and help you stay active. Follow-up care is a key part of your treatment and safety. Be sure to make and go to all appointments, and call your doctor if you are having problems. It's also a good idea to know your test results and keep a list of the medicines you take. How can you care for yourself at home? · Stay at a healthy weight. Being overweight puts extra strain on your joints. · Talk to your doctor or physical therapist about exercises that will help ease joint pain. ¨ Stretch. You may enjoy gentle forms of yoga to help keep your joints and muscles flexible. ¨ Walk instead of jog. Other types of exercise that are less stressful on the joints include riding a bicycle, swimming, stephen chi, or water exercise. ¨ Lift weights. Strong muscles help reduce stress on your joints. Stronger thigh muscles, for example, take some of the stress off of the knees and hips. Learn the right way to lift weights so you do not make joint pain worse. · Take your medicines exactly as prescribed. Call your doctor if you think you are having a problem with your medicine. · Take pain medicines exactly as directed. ¨ If the doctor gave you a prescription medicine for pain, take it as prescribed. ¨ If you are not taking a prescription pain medicine, ask your doctor if you can take an over-the-counter medicine. · Use a cane, crutch, walker, or another device if you need help to get around. These can help rest your joints.  You also can use other things to make life easier, such as a higher toilet seat and padded handles on kitchen utensils. · Do not sit in low chairs, which can make it hard to get up. · Put heat or cold on your sore joints as needed. Use whichever helps you most. You also can take turns with hot and cold packs. ¨ Apply heat 2 or 3 times a day for 20 to 30 minutes-using a heating pad, hot shower, or hot pack-to relieve pain and stiffness. ¨ Put ice or a cold pack on your sore joint for 10 to 20 minutes at a time. Put a thin cloth between the ice and your skin. When should you call for help? Call your doctor now or seek immediate medical care if:  ? · You have sudden swelling, warmth, or pain in any joint. ? · You have joint pain and a fever or rash. ? · You have such bad pain that you cannot use a joint. ? Watch closely for changes in your health, and be sure to contact your doctor if:  ? · You have mild joint symptoms that continue even with more than 6 weeks of care at home. ? · You have stomach pain or other problems with your medicine. Where can you learn more? Go to http://ora-claude.info/. Enter N365 in the search box to learn more about \"Arthritis: Care Instructions. \"  Current as of: October 31, 2016  Content Version: 11.4  © 4904-9216 [employer]. Care instructions adapted under license by ideacts innovations (which disclaims liability or warranty for this information). If you have questions about a medical condition or this instruction, always ask your healthcare professional. [place] [address] any warranty or liability for your use of this information. DASH Diet: Care Instructions  Your Care Instructions    The DASH diet is an eating plan that can help lower your blood pressure. DASH stands for Dietary Approaches to Stop Hypertension. Hypertension is high blood pressure. The DASH diet focuses on eating foods that are high in calcium, potassium, and magnesium.  These nutrients can lower blood pressure. The foods that are highest in these nutrients are fruits, vegetables, low-fat dairy products, nuts, seeds, and legumes. But taking calcium, potassium, and magnesium supplements instead of eating foods that are high in those nutrients does not have the same effect. The DASH diet also includes whole grains, fish, and poultry. The DASH diet is one of several lifestyle changes your doctor may recommend to lower your high blood pressure. Your doctor may also want you to decrease the amount of sodium in your diet. Lowering sodium while following the DASH diet can lower blood pressure even further than just the DASH diet alone. Follow-up care is a key part of your treatment and safety. Be sure to make and go to all appointments, and call your doctor if you are having problems. It's also a good idea to know your test results and keep a list of the medicines you take. How can you care for yourself at home? Following the DASH diet  · Eat 4 to 5 servings of fruit each day. A serving is 1 medium-sized piece of fruit, ½ cup chopped or canned fruit, 1/4 cup dried fruit, or 4 ounces (½ cup) of fruit juice. Choose fruit more often than fruit juice. · Eat 4 to 5 servings of vegetables each day. A serving is 1 cup of lettuce or raw leafy vegetables, ½ cup of chopped or cooked vegetables, or 4 ounces (½ cup) of vegetable juice. Choose vegetables more often than vegetable juice. · Get 2 to 3 servings of low-fat and fat-free dairy each day. A serving is 8 ounces of milk, 1 cup of yogurt, or 1 ½ ounces of cheese. · Eat 6 to 8 servings of grains each day. A serving is 1 slice of bread, 1 ounce of dry cereal, or ½ cup of cooked rice, pasta, or cooked cereal. Try to choose whole-grain products as much as possible. · Limit lean meat, poultry, and fish to 2 servings each day. A serving is 3 ounces, about the size of a deck of cards.   · Eat 4 to 5 servings of nuts, seeds, and legumes (cooked dried beans, lentils, and split peas) each week. A serving is 1/3 cup of nuts, 2 tablespoons of seeds, or ½ cup of cooked beans or peas. · Limit fats and oils to 2 to 3 servings each day. A serving is 1 teaspoon of vegetable oil or 2 tablespoons of salad dressing. · Limit sweets and added sugars to 5 servings or less a week. A serving is 1 tablespoon jelly or jam, ½ cup sorbet, or 1 cup of lemonade. · Eat less than 2,300 milligrams (mg) of sodium a day. If you limit your sodium to 1,500 mg a day, you can lower your blood pressure even more. Tips for success  · Start small. Do not try to make dramatic changes to your diet all at once. You might feel that you are missing out on your favorite foods and then be more likely to not follow the plan. Make small changes, and stick with them. Once those changes become habit, add a few more changes. · Try some of the following:  ¨ Make it a goal to eat a fruit or vegetable at every meal and at snacks. This will make it easy to get the recommended amount of fruits and vegetables each day. ¨ Try yogurt topped with fruit and nuts for a snack or healthy dessert. ¨ Add lettuce, tomato, cucumber, and onion to sandwiches. ¨ Combine a ready-made pizza crust with low-fat mozzarella cheese and lots of vegetable toppings. Try using tomatoes, squash, spinach, broccoli, carrots, cauliflower, and onions. ¨ Have a variety of cut-up vegetables with a low-fat dip as an appetizer instead of chips and dip. ¨ Sprinkle sunflower seeds or chopped almonds over salads. Or try adding chopped walnuts or almonds to cooked vegetables. ¨ Try some vegetarian meals using beans and peas. Add garbanzo or kidney beans to salads. Make burritos and tacos with mashed willis beans or black beans. Where can you learn more? Go to http://ora-claude.info/. Enter Y173 in the search box to learn more about \"DASH Diet: Care Instructions. \"  Current as of: September 21, 2016  Content Version: 11.4  © 3928-4045 [employer]. Care instructions adapted under license by [employer] (which disclaims liability or warranty for this information). If you have questions about a medical condition or this instruction, always ask your healthcare professional. [address] any warranty or liability for your use of this information.

## 2018-01-17 NOTE — PROGRESS NOTES
Bonnie Brown is a 75 y.o. female who presents with the following complaints:  Chief Complaint   Patient presents with    Joint Pain     C/o multiple area of joint pain related to arthritis        Subjective:    HPI:   C/o continued arthritis pain and stiffness in both knees, feet, back, neck, and hands. She reports multiple visits to address symptoms. States gabapentin and naproxen did not help at all, she has stopped taking both. Reports her friends are taking prednisone for their arthritis pain, would like to know if this can be prescribed for her. Was referred to rheumatology at her last visit here in November, but has not yet been seen- first available appt in march. She reports pain is severe at times and wakes her from sleep. Pain is aggravated by movement, alleviated somewhat by rest.   Tylenol arthritis is not helping. She is overdue for diabetes f/u.     Pertinent PMH/FH/SH:  Past Medical History:   Diagnosis Date    Diabetes ([facility]     Hypertension     Obesity, unspecified     Other and unspecified hyperlipidemia     Unspecified vitamin D deficiency      Past Surgical History:   Procedure Laterality Date    HX HYSTERECTOMY  25     Family History   Problem Relation Age of Onset    Cancer Other      breast     Social History     Social History    Marital status:      Spouse name: N/A    Number of children: N/A    Years of education: N/A     Social History Main Topics    Smoking status: Former Smoker     Quit date: 1/20/2015    Smokeless tobacco: Never Used    Alcohol use Yes      Comment: socially    Drug use: No    Sexual activity: No     Other Topics Concern    None     Social History Narrative     Advanced Directives: N      Patient Active Problem List    Diagnosis    Type 2 diabetes mellitus with diabetic neuropathy ([facility]    Controlled type 2 diabetes mellitus without complication, without long-term current use of insulin ([facility]    Diabetes mellitus type 2, controlled ([facility]    Arthritis    Hypercholesteremia    Essential hypertension    Insomnia    Hx of diabetes mellitus       Nurse notes were reviewed and are correct  Review of Systems - negative except as listed above in the HPI    Objective:     Vitals:    01/16/18 1101   BP: 132/83   Pulse: 89   Resp: 20   Temp: 98.3 °F (36.8 °C)   TempSrc: Oral   SpO2: 96%   Weight: 229 lb (103.9 kg)   Height: 5' 5\" (1.651 m)     Physical Examination: General appearance - alert, well appearing, and in no distress and well hydrated  Mental status - normal mood, behavior, speech, dress, motor activity, and thought processes  Neck - supple, no significant adenopathy  Chest - clear to auscultation, no wheezes, rales or rhonchi, symmetric air entry  Heart - normal rate, regular rhythm, normal S1, S2, no murmurs, rubs, clicks or gallops  Abdomen - soft, nontender, nondistended, no masses or organomegaly  Neurological - alert, oriented, normal speech, no focal findings or movement disorder noted  Musculoskeletal - generalized tenderness of lumbar, cervical areas, bilateral knees, hands. No joint deformity noted  Extremities - no pedal edema noted  Skin - normal coloration and turgor    Assessment/ Plan:   Diagnoses and all orders for this visit:    1. Arthritis  Discontinue naproxen  Restart gabapentin 300 mg qhs  Add mobic  Refer to alternate provider for rheumatology consult  Increase physical activity/consider PT  Weight loss  -     meloxicam (MOBIC) 7.5 mg tablet; Take 1 Tab by mouth daily.  -     REFERRAL TO RHEUMATOLOGY    2. Type 2 diabetes mellitus with diabetic neuropathy, without long-term current use of insulin ([facility]  Overdue for diabetes check up  Return in the next month for office visit and fasting labs  Continue current meds  Check fasting glucose daily and bring log to next appt    3.  Essential hypertension  At goal  Continue current meds  DASH diet  Weight loss       Follow-up Disposition:  Return in about 4 weeks (around 2/13/2018) for diabetes checkup. I have discussed the diagnosis with the patient and the intended plan as seen in the above orders. The patient has received an after-visit summary and questions were answered concerning future plans. The patient verbalizes understanding. Medication Side Effects and Warnings were discussed with patient: yes  Patient Labs were reviewed and or requested: yes  Patient Past Records were reviewed and or requested: yes    Patient Instructions          Arthritis: Care Instructions  Your Care Instructions  Arthritis, also called osteoarthritis, is a breakdown of the cartilage that cushions your joints. When the cartilage wears down, your bones rub against each other. This causes pain and stiffness. Many people have some arthritis as they age. Arthritis most often affects the joints of the spine, hands, hips, knees, or feet. You can take simple measures to protect your joints, ease your pain, and help you stay active. Follow-up care is a key part of your treatment and safety. Be sure to make and go to all appointments, and call your doctor if you are having problems. It's also a good idea to know your test results and keep a list of the medicines you take. How can you care for yourself at home? · Stay at a healthy weight. Being overweight puts extra strain on your joints. · Talk to your doctor or physical therapist about exercises that will help ease joint pain. ¨ Stretch. You may enjoy gentle forms of yoga to help keep your joints and muscles flexible. ¨ Walk instead of jog. Other types of exercise that are less stressful on the joints include riding a bicycle, swimming, stephen chi, or water exercise. ¨ Lift weights. Strong muscles help reduce stress on your joints. Stronger thigh muscles, for example, take some of the stress off of the knees and hips. Learn the right way to lift weights so you do not make joint pain worse. · Take your medicines exactly as prescribed.  Call your doctor if you think you are having a problem with your medicine. · Take pain medicines exactly as directed. ¨ If the doctor gave you a prescription medicine for pain, take it as prescribed. ¨ If you are not taking a prescription pain medicine, ask your doctor if you can take an over-the-counter medicine. · Use a cane, crutch, walker, or another device if you need help to get around. These can help rest your joints. You also can use other things to make life easier, such as a higher toilet seat and padded handles on kitchen utensils. · Do not sit in low chairs, which can make it hard to get up. · Put heat or cold on your sore joints as needed. Use whichever helps you most. You also can take turns with hot and cold packs. ¨ Apply heat 2 or 3 times a day for 20 to 30 minutes-using a heating pad, hot shower, or hot pack-to relieve pain and stiffness. ¨ Put ice or a cold pack on your sore joint for 10 to 20 minutes at a time. Put a thin cloth between the ice and your skin. When should you call for help? Call your doctor now or seek immediate medical care if:  ? · You have sudden swelling, warmth, or pain in any joint. ? · You have joint pain and a fever or rash. ? · You have such bad pain that you cannot use a joint. ? Watch closely for changes in your health, and be sure to contact your doctor if:  ? · You have mild joint symptoms that continue even with more than 6 weeks of care at home. ? · You have stomach pain or other problems with your medicine. Where can you learn more? Go to http://ora-cladue.info/. Enter P456 in the search box to learn more about \"Arthritis: Care Instructions. \"  Current as of: October 31, 2016  Content Version: 11.4  © 4792-4382 [employer]. Care instructions adapted under license by [employer] (which disclaims liability or warranty for this information).  If you have questions about a medical condition or this instruction, always ask your healthcare professional. [place] [address] any warranty or liability for your use of this information. DASH Diet: Care Instructions  Your Care Instructions    The DASH diet is an eating plan that can help lower your blood pressure. DASH stands for Dietary Approaches to Stop Hypertension. Hypertension is high blood pressure. The DASH diet focuses on eating foods that are high in calcium, potassium, and magnesium. These nutrients can lower blood pressure. The foods that are highest in these nutrients are fruits, vegetables, low-fat dairy products, nuts, seeds, and legumes. But taking calcium, potassium, and magnesium supplements instead of eating foods that are high in those nutrients does not have the same effect. The DASH diet also includes whole grains, fish, and poultry. The DASH diet is one of several lifestyle changes your doctor may recommend to lower your high blood pressure. Your doctor may also want you to decrease the amount of sodium in your diet. Lowering sodium while following the DASH diet can lower blood pressure even further than just the DASH diet alone. Follow-up care is a key part of your treatment and safety. Be sure to make and go to all appointments, and call your doctor if you are having problems. It's also a good idea to know your test results and keep a list of the medicines you take. How can you care for yourself at home? Following the DASH diet  · Eat 4 to 5 servings of fruit each day. A serving is 1 medium-sized piece of fruit, ½ cup chopped or canned fruit, 1/4 cup dried fruit, or 4 ounces (½ cup) of fruit juice. Choose fruit more often than fruit juice. · Eat 4 to 5 servings of vegetables each day. A serving is 1 cup of lettuce or raw leafy vegetables, ½ cup of chopped or cooked vegetables, or 4 ounces (½ cup) of vegetable juice. Choose vegetables more often than vegetable juice. · Get 2 to 3 servings of low-fat and fat-free dairy each day.  A serving is 8 ounces of milk, 1 cup of yogurt, or 1 ½ ounces of cheese. · Eat 6 to 8 servings of grains each day. A serving is 1 slice of bread, 1 ounce of dry cereal, or ½ cup of cooked rice, pasta, or cooked cereal. Try to choose whole-grain products as much as possible. · Limit lean meat, poultry, and fish to 2 servings each day. A serving is 3 ounces, about the size of a deck of cards. · Eat 4 to 5 servings of nuts, seeds, and legumes (cooked dried beans, lentils, and split peas) each week. A serving is 1/3 cup of nuts, 2 tablespoons of seeds, or ½ cup of cooked beans or peas. · Limit fats and oils to 2 to 3 servings each day. A serving is 1 teaspoon of vegetable oil or 2 tablespoons of salad dressing. · Limit sweets and added sugars to 5 servings or less a week. A serving is 1 tablespoon jelly or jam, ½ cup sorbet, or 1 cup of lemonade. · Eat less than 2,300 milligrams (mg) of sodium a day. If you limit your sodium to 1,500 mg a day, you can lower your blood pressure even more. Tips for success  · Start small. Do not try to make dramatic changes to your diet all at once. You might feel that you are missing out on your favorite foods and then be more likely to not follow the plan. Make small changes, and stick with them. Once those changes become habit, add a few more changes. · Try some of the following:  ¨ Make it a goal to eat a fruit or vegetable at every meal and at snacks. This will make it easy to get the recommended amount of fruits and vegetables each day. ¨ Try yogurt topped with fruit and nuts for a snack or healthy dessert. ¨ Add lettuce, tomato, cucumber, and onion to sandwiches. ¨ Combine a ready-made pizza crust with low-fat mozzarella cheese and lots of vegetable toppings. Try using tomatoes, squash, spinach, broccoli, carrots, cauliflower, and onions. ¨ Have a variety of cut-up vegetables with a low-fat dip as an appetizer instead of chips and dip.   ¨ Sprinkle sunflower seeds or chopped almonds over salads. Or try adding chopped walnuts or almonds to cooked vegetables. ¨ Try some vegetarian meals using beans and peas. Add garbanzo or kidney beans to salads. Make burritos and tacos with mashed willis beans or black beans. Where can you learn more? Go to http://ora-claude.info/. Enter L086 in the search box to learn more about \"DASH Diet: Care Instructions. \"  Current as of: September 21, 2016  Content Version: 11.4  © 1692-8270 [employer]. Care instructions adapted under license by [facility] [facility] (which disclaims liability or warranty for this information). If you have questions about a medical condition or this instruction, always ask your healthcare professional. [address] any warranty or liability for your use of this information.           Tsering BURDICK

## 2018-03-13 ENCOUNTER — OFFICE VISIT (OUTPATIENT)
Dept: RHEUMATOLOGY | Age: 68
End: 2018-03-13

## 2018-03-13 VITALS
DIASTOLIC BLOOD PRESSURE: 86 MMHG | TEMPERATURE: 98.2 F | WEIGHT: 227.6 LBS | HEART RATE: 79 BPM | RESPIRATION RATE: 16 BRPM | SYSTOLIC BLOOD PRESSURE: 125 MMHG | BODY MASS INDEX: 37.87 KG/M2 | OXYGEN SATURATION: 95 %

## 2018-03-13 DIAGNOSIS — G89.29 CHRONIC BACK PAIN GREATER THAN 3 MONTHS DURATION: ICD-10-CM

## 2018-03-13 DIAGNOSIS — M79.7 FIBROMYALGIA: Primary | ICD-10-CM

## 2018-03-13 DIAGNOSIS — M17.0 PRIMARY OSTEOARTHRITIS OF BOTH KNEES: ICD-10-CM

## 2018-03-13 DIAGNOSIS — E66.9 OBESITY (BMI 30-39.9): ICD-10-CM

## 2018-03-13 DIAGNOSIS — M54.9 CHRONIC BACK PAIN GREATER THAN 3 MONTHS DURATION: ICD-10-CM

## 2018-03-13 RX ORDER — DICLOFENAC SODIUM 75 MG/1
TABLET, DELAYED RELEASE ORAL
COMMUNITY
End: 2018-05-23 | Stop reason: SDUPTHER

## 2018-03-13 RX ORDER — ACETAMINOPHEN 500 MG
TABLET ORAL
COMMUNITY
End: 2020-12-21 | Stop reason: ALTCHOICE

## 2018-03-13 NOTE — MR AVS SNAPSHOT
[address] 
[phone] Patient: Young Maynard MRN: [id number] Visit Information Date & Time Provider Department Dept. Phone Encounter #  
 3/13/2018  3:00 PM Colt RussDarrian [id number] Upcoming Health Maintenance Date Due  
 EYE EXAM RETINAL OR DILATED Q1 5/28/1960 FOBT Q 1 YEAR AGE 50-75 11/30/2012 LIPID PANEL Q1 11/20/2016 FOOT EXAM Q1 4/29/2017 MICROALBUMIN Q1 4/29/2017 HEMOGLOBIN A1C Q6M 5/14/2017 GLAUCOMA SCREENING Q2Y 5/29/2017 MEDICARE YEARLY EXAM 7/28/2017 BREAST CANCER SCRN MAMMOGRAM 12/20/2019 DTaP/Tdap/Td series (2 - Td) 6/25/2023 Allergies as of 3/13/2018  Review Complete On: 3/13/2018 By: Colt Russ MD  
  
 Severity Noted Reaction Type Reactions Codeine Medium 05/27/2015    Other (comments) \"Didn't like the way it made me feel\" Lipitor [Atorvastatin] Medium 05/27/2015    Other (comments)  
 cramps Pcn [Penicillins] Medium 05/27/2015    Itching Current Immunizations  Reviewed on 11/23/2015 Name Date Influenza Vaccine 11/20/2015, 11/13/2013 Influenza Vaccine Anila New) 11/14/2016 Influenza Vaccine (Quad) PF 11/3/2017 Not reviewed this visit You Were Diagnosed With   
  
 Codes Comments Fibromyalgia    -  Primary ICD-10-CM: M79.7 ICD-9-CM: 729.1 Primary osteoarthritis of both knees     ICD-10-CM: M17.0 ICD-9-CM: 715.16 Vitals BP Pulse Temp Resp Weight(growth percentile) SpO2  
 125/86 (BP 1 Location: Left arm, BP Patient Position: Sitting) 79 98.2 °F (36.8 °C) (Oral) 16 227 lb 9.6 oz (103.2 kg) 95% BMI OB Status Smoking Status 37.87 kg/m2 Postmenopausal Former Smoker BMI and BSA Data Body Mass Index Body Surface Area  
 37.87 kg/m 2 2.18 m 2 Preferred Pharmacy Pharmacy Name Phone [id number] [address] [facility] [phone] Your Updated Medication List  
  
   
This list is accurate as of 3/13/18  3:28 PM.  Always use your most recent med list.  
  
  
  
  
 alcohol swabs Padm Commonly known as:  BD Single Use Swabs Regular 1 Swab by Apply Externally route daily. aspirin 81 mg chewable tablet Take 81 mg by mouth daily. Blood-Glucose Meter Misc Commonly known as:  TRUE METRIX AIR GLUCOSE METER Use to test blood sugar once daily. Dx:E11.9  
  
 diclofenac EC 75 mg EC tablet Commonly known as:  VOLTAREN Take  by mouth.  
  
 gabapentin 300 mg capsule Commonly known as:  NEURONTIN  
TAKE 1 CAPSULE TWICE DAILY  
  
 glucose blood VI test strips strip Commonly known as:  TRUE METRIX GLUCOSE TEST STRIP Use to check blood sugar once daily. Dx: E11.9  
  
 lancets 30 gauge Misc Commonly known as:  Renella Livings Use to test blood sugar once daily. Dx:E11.9  
  
 lisinopril 10 mg tablet Commonly known as:  PRINIVIL, ZESTRIL  
TAKE 1 TABLET EVERY DAY  
  
 metFORMIN 500 mg tablet Commonly known as:  GLUCOPHAGE  
TAKE 1 TABLET DAILY WITH MEALS MILK OF MAGNESIA PO  
  
 triamterene-hydroCHLOROthiazide 37.5-25 mg per capsule Commonly known as:  Olya Blight TAKE 1 CAPSULE EVERY DAY  
  
 TYLENOL EXTRA STRENGTH 500 mg tablet Generic drug:  acetaminophen Take  by mouth every six (6) hours as needed for Pain. VITAMIN D3 2,000 unit Cap capsule Generic drug:  Cholecalciferol (Vitamin D3) Take 1 Cap by mouth daily. Patient Instructions FIBROMYALGIA Fibromyalgia is a disease characterized by chronic widespread musculoskeletal pain. Fibromyalgia is caused by abnormal processing of pain signals in the central nervous system, leading to exaggerated pain responses. There are functional MRI studies that have shown neuroplasticity (re-wiring) of the pain pathways in the brain. Physical and Mental Exercise The mainstay of therapy includes non-pharmacologic therapies such as cardiovascular exercise and Cognitive Behavioral Therapy which have been shown to be of benefit ([facility], Am J Med 2009). Kofi Chi in particular has proven efficacy in the treatment of fibromyalgia Matheus Dupont al. Reji Borden J Med 2010; 257:705-265). Performing at least 60 minutes per day of [place] has been shown to improve pain without medical management. Medications After discussing with your primary care and if medications are pursued, pregabalin (Lyrica), gabapentin (Neurontin), milnacipran (Daily Desanctis), and duloxetine (Cymbalta) are FDA approved medications for the treatment of fibromyalgia. Narcotic Pain Medications Are BAD Narcotics, such as oxycodone, hydrocodone, morphine, dilaudid, or codeine, have not been proven to be efficacious in the treatment of fibromyalgia. In fact, narcotic use in this patient population has been observed to exacerbate depression, and may enhance the hyperalgesia which is characteristic of this condition (Mj Kasi Rheum 2006). They also are at increased risk for opioid-induced hyperalgesia due predominantly to central sensitization Emi GUERIN et al. Jamie Sheikh Clin Rheumatol. 2013 Mar;19(2):72-7). Specifically, a double-blind placebo-controlled trial by Aida Gowers al published in 1995 demonstrated that intravenous morphine did not reduce pain in fibromyalgia patients. A study by Arsen Diaz al published in 2003 showed that fibromyalgia patients taking oral opiates did not experience improvement in their pain at four years of follow up, and also reported increased depression over the last two years of the study. There is subsequent concern that the prolonged use of narcotics to treat fibromyalgia may cause harm to these patients Dieudonne Boudreaux, Pain 2005).  Finally, opioid use in fibromyalgia had poorer symptoms and functional and occupational status compared to nonusers (Kendy BRADSHAW et al. Pain Res Treat. 3294;9782:121424). Therefore, I recommend that narcotics be avoided in all patients with fibromyalgia. My Recommendations I recommend Kofi Chi stretching exercises for at least 30 minutes per day. The Arthritis Foundation has made a videotape of Kofi Chi that you can borrow from [employer], purchase online or watch for free on [employer]. com Kofi Chi for Arthritis. I strongly recommend you to join a gym that has an indoor pool, to do aqua therapy and Kofi Chi classes. Resources include: [employer]uto, Medigram, and the [employer]. We discussed treating secondary causes, such as sleep apnea, poor sleep quality, depression, anxiety weight loss, vitamin deficiencies, such as vitamin D, and pursuing aquatherapy. I encourage you to do [address]. KNEE OSTEOARTHRITIS. Osteoarthritis is also known as \"wear and tear arthritis,\" mechanical arthritis, or non-inflammatory arthritis. There are hereditary and vocational components and post-traumatic joint injuries may also predispose to it. It is not Rheumatoid Arthritis, however, patients with Rheumatoid Arthritis may also have osteoarthritis. I recommend maintaining a healthy weight to slow the progression of osteoarthritis in addition to following the [employer] of Rheumatology Osteoarthritis Treatment Guidelines (Boyd MC, et al. Arthritis Care Res [facility] ORTHOPEDIC). 2012;64(4):465):  
 
(1) non-pharmacologic modalities such as aerobic, aquatic, and/or resistance exercises as well as weight loss for overweight patients   
 
(2) pharmacologic modalities, such as acetaminophen as first line (3000 mg maximum per day) and NSAIDs, such as naproxen (Aleve) two tablets of 220 mg twice daily with food, OR ibuprofen (Advil) two tablets of 200 mg three times daily, with food as second line therapy. Naproxen (Aleve) is associated with a lower cardiovascular risk than other NSAIDs (Marv HURLEY, Yang WELSH.  Clinical Pharmacology and Cardiovascular Safety of Naproxen. Am J Cardiovasc Drugs. 2016 Nov 8). NSAIDs should not be used in patients on blood thinners, chronic kidney disease, high risk coronary artery disease, and inflammatory bowel disease (ulcerative colitis or Crohn's disease) 
 
(3) tramadol, as third line  
 
(4) intra-articular corticosteroid or viscosupplements injections, as fourth line 
 
(5) knee joint replacement surgery (about 10 years lifespan of prosthesis functionality) The combination of acetaminophen and NSAIDs are safe together. Please do not combine NSAIDs together, such as Aleve, Advil and Mobic (meloxicam) MY RECOMMENDATIONS 
 
WEIGHT LOSS 
 
1) I recommend weight loss because every 1 lb of extra weight is approximately 5 lbs of extra weight on the knees. 2) Please count your daily caloric intake and try not to exceed 5290-4047 calories. EXERCISE 
 
1) You should perform at least 30 minutes of physical exercise per day, such as walking, climbing stairs, or swimming. If you have access to a pool, I recommend walking in the pool for at least 30 minutes every day. AND/OR 2) Performing at least 8 weeks of yoga (two 60-minute classes and 1 home practice per week) was shown to help individuals with arthritis safely increase physical activity, and improve physical and psychological health [facility] et al. [place] Quant Rheumatol. 2015 Jul;42(7):1194-202). Introducing [facility]! [employer] introduces [employer]orHub patient portal. Now you can access parts of your medical record, email your doctor's office, and request medication refills online. 1. In your internet browser, go to [email or web address] 2. Click on the First Time User? Click Here link in the Sign In box. You will see the New Member Sign Up page. 3. Enter your SponsorHub Access Code exactly as it appears below. You will not need to use this code after youve completed the sign-up process.  If you do not sign up before the expiration date, you must request a new code. · [email or web address] Access Code: [email or web address] Expires: 6/11/2018  3:28 PM 
 
4. Enter the last four digits of your Social Security Number (xxxx) and Date of Birth (mm/dd/yyyy) as indicated and click Submit. You will be taken to the next sign-up page. 5. Create a [employer] ID. This will be your [employer] login ID and cannot be changed, so think of one that is secure and easy to remember. 6. Create a [employer] password. You can change your password at any time. 7. Enter your Password Reset Question and Answer. This can be used at a later time if you forget your password. 8. Enter your e-mail address. You will receive e-mail notification when new information is available in [address] 9. Click Sign Up. You can now view and download portions of your medical record. 10. Click the Download Summary menu link to download a portable copy of your medical information. If you have questions, please visit the Frequently Asked Questions section of the [employer] website. Remember, [employer] is NOT to be used for urgent needs. For medical emergencies, dial 911. Now available from your iPhone and Android! Please provide this summary of care documentation to your next provider. Your primary care clinician is listed as Matt Wheeler. If you have any questions after today's visit, please call [phone].

## 2018-03-13 NOTE — PATIENT INSTRUCTIONS
FIBROMYALGIA    Fibromyalgia is a disease characterized by chronic widespread musculoskeletal pain. Fibromyalgia is caused by abnormal processing of pain signals in the central nervous system, leading to exaggerated pain responses. There are functional MRI studies that have shown neuroplasticity (re-wiring) of the pain pathways in the brain. Physical and Mental Exercise    The mainstay of therapy includes non-pharmacologic therapies such as cardiovascular exercise and Cognitive Behavioral Therapy which have been shown to be of benefit ([facility], Am J Med 2009). Kofi Chi in particular has proven efficacy in the treatment of fibromyalgia Sarah Aguilar et al. Lear Reeve J Med 2010; 573:985-351). Performing at least 60 minutes per day of Yuliana Pila has been shown to improve pain without medical management. Medications    After discussing with your primary care and if medications are pursued, pregabalin (Lyrica), gabapentin (Neurontin), milnacipran (Jerris Nephew), and duloxetine (Cymbalta) are FDA approved medications for the treatment of fibromyalgia. Narcotic Pain Medications Are BAD    Narcotics, such as oxycodone, hydrocodone, morphine, dilaudid, or codeine, have not been proven to be efficacious in the treatment of fibromyalgia. In fact, narcotic use in this patient population has been observed to exacerbate depression, and may enhance the hyperalgesia which is characteristic of this condition (Nicholas Stef Rheum 2006). They also are at increased risk for opioid-induced hyperalgesia due predominantly to central sensitization Ean Mcginnis al. [facility] [place] [facility] Rheumatol. 2013 Mar;19(2):72-7). Specifically, a double-blind placebo-controlled trial by Pacheco Marr al published in 1995 demonstrated that intravenous morphine did not reduce pain in fibromyalgia patients.  A study by Maggy Mahoney al published in 2003 showed that fibromyalgia patients taking oral opiates did not experience improvement in their pain at four years of follow up, and also reported increased depression over the last two years of the study. There is subsequent concern that the prolonged use of narcotics to treat fibromyalgia may cause harm to these patients Deborah Jernigan, Pain 2005). Finally, opioid use in fibromyalgia had poorer symptoms and functional and occupational status compared to nonusers (Kendy BRADSHAW et al. Pain Res Treat. 3736;1223:003004). Therefore, I recommend that narcotics be avoided in all patients with fibromyalgia. My Recommendations    I recommend Kofi Chi stretching exercises for at least 30 minutes per day. The [employer] has made a videotape of Kofi Chi that you can borrow from [employer], purchase online or watch for free on [email or web address] Kofi Chi for Arthritis. I strongly recommend you to join a gym that has an indoor pool, to do aqua therapy and Kofi Chi classes. Resources include: [employer], [employer], and the [employer]. We discussed treating secondary causes, such as sleep apnea, poor sleep quality, depression, anxiety weight loss, vitamin deficiencies, such as vitamin D, and pursuing aquatherapy. I encourage you to do Ysitie [address]. KNEE OSTEOARTHRITIS. Osteoarthritis is also known as \"wear and tear arthritis,\" mechanical arthritis, or non-inflammatory arthritis. There are hereditary and vocational components and post-traumatic joint injuries may also predispose to it. It is not Rheumatoid Arthritis, however, patients with Rheumatoid Arthritis may also have osteoarthritis. I recommend maintaining a healthy weight to slow the progression of osteoarthritis in addition to following the [employer] of Rheumatology Osteoarthritis Treatment Guidelines (Boyd MC, et al. Arthritis Care Res [facility]).  2012;64(4):465):     (1) non-pharmacologic modalities such as aerobic, aquatic, and/or resistance exercises as well as weight loss for overweight patients      (2) pharmacologic modalities, such as acetaminophen as first line (3000 mg maximum per day) and NSAIDs, such as naproxen (Aleve) two tablets of 220 mg twice daily with food, OR ibuprofen (Advil) two tablets of 200 mg three times daily, with food as second line therapy. Naproxen (Aleve) is associated with a lower cardiovascular risk than other NSAIDs (Marv HURLEY, Yang WELSH. Clinical Pharmacology and Cardiovascular Safety of Naproxen. Am J Cardiovasc Drugs. 2016 Nov 8). NSAIDs should not be used in patients on blood thinners, chronic kidney disease, high risk coronary artery disease, and inflammatory bowel disease (ulcerative colitis or Crohn's disease)    (3) tramadol, as third line     (4) intra-articular corticosteroid or viscosupplements injections, as fourth line    (5) knee joint replacement surgery (about 10 years lifespan of prosthesis functionality)    The combination of acetaminophen and NSAIDs are safe together. Please do not combine NSAIDs together, such as Aleve, Advil and Mobic (meloxicam)      MY RECOMMENDATIONS    WEIGHT LOSS    1) I recommend weight loss because every 1 lb of extra weight is approximately 5 lbs of extra weight on the knees. 2) Please count your daily caloric intake and try not to exceed 0924-3538 calories. EXERCISE    1) You should perform at least 30 minutes of physical exercise per day, such as walking, climbing stairs, or swimming. If you have access to a pool, I recommend walking in the pool for at least 30 minutes every day. AND/OR    2) Performing at least 8 weeks of yoga (two 60-minute classes and 1 home practice per week) was shown to help individuals with arthritis safely increase physical activity, and improve physical and psychological health [facility] et al. [facility] [place] Rheumatol. 2015 Jul;42(7):1194-202).

## 2018-03-13 NOTE — PROGRESS NOTES
REASON FOR VISIT    This is the initial evaluation for Ms. Lazaro Lab a 75 y.o.  female for question of an inflammatory arthritis. The patient is referred to the Arthritis and [address] [place] [address] at the request of Pedrito Gandhi NP.     HISTORY OF PRESENT ILLNESS      I have reviewed and summarized old records from [facility]    In 2015, she developed diffuse constant (burning, aching, stabbing, shooting) pain in her back, shoulder, neck, hands,wrists, legs, and feet. Walking makes it worse. No known relieving factors. She does water aerobics which makes her feel better but does not resolve the pain. Tylenol, diclofenac, naproxen, gabapentin does not help. In 2/24/2016, Bilateral Knee radiographs showed  bilateral tricompartmental joint space narrowing which is moderate in the medial and patellofemoral compartments and mild in the lateral compartment. Tricompartmental marginal osteophytes are shown bilaterally, moderate in size in the medial and patellofemoral compartments and small in the lateral compartments. There is bilateral loss of valgus. No knee effusion is shown. There is no fracture or dislocation. Overall bone mineralization is within normal limits. In 8/24/2016, labs showed negative KRISHNA direct and rheumatoid factor. In 5/05/2017, MRI Lumbar Spine without contrast there is normal alignment of the lumbar spine. Vertebral body heights are maintained. There are no suspicious marrow signal abnormalities. There are chronic-appearing reactive changes in the endplates at M6-R4. The conus medullaris terminates at L1. Signal and caliber of the distal spinal cord are within normal limits. The paraspinal soft tissues are within normal limits. Lower thoracic spine: No herniation or stenosis. L1-L2:  No herniation or stenosis. Minimal bulging disc. Mild facet arthrosis. L2-L3:  No herniation or stenosis. Minimal bulging disc. Mild facet arthrosis. L3-L4:  No herniation or stenosis. Minimal bulging disc. Mild facet arthrosis. L4-L5:  Mild bulging disc. Mild to moderate facet hypertrophy. No central or foraminal stenosis. L5-S1:  Disc degeneration with marked loss of height of the disc. Moderate bilateral facet hypertrophy. No central spinal stenosis. Severe bilateral foraminal stenosis. Therapy History includes:    Current DMARD therapy includes: none  Prior DMARD therapy includes: none  The following DMARDs have been ineffective: none  The following DMARDs were stopped because of side effects: none    REVIEW OF SYSTEMS    A 15 point review of systems was performed and summarized below. The questionnaire was reviewed with the patient and scanned into the patient's medical record.     General: denies recent weight gain, recent weight loss, fatigue, weakness, fever, night sweats  Musculoskeletal: endorses joint pain, denies joint swelling, morning stiffness, muscle weakness  Ears: denies ringing in ears, loss of hearing, deafness  Eyes: denies pain, redness, loss of vision, double vision, blurred vision, dryness, foreign body sensation  Mouth: denies sore tongue, oral ulcers, bleeding gums, loss of taste, dryness, increased dental caries  Nose: denies nosebleeds, loss of smell, nasal ulcers  Throat: denies frequent sore throats, hoarseness, difficulty in swallowing, pain in jaw while chewing  Neck: denies swollen glands, tender glands  Cardiopulmonary: denies pain in chest, irregular heart beat, sudden changes in heart beat, shortness of breath, difficulty breathing at night, swollen legs or feet, cough, coughing of blood, wheezing  Gastrointestinal: denies nausea, heartburn, stomach pain relieved by food, vomiting of blood/\"coffee grounds\", jaundice, increasing constipation, persistent diarrhea, blood in stools, black stools  Genitourinary: denies nocturia, difficult urination, pain or burning on urination, blood in urine, cloudy urine, pus in urine, genital discharge, frequent urination, vaginal dryness, rash/ulcers, sexual difficulties  Hematologic: denies anemia, bleeding tendency, blood clots  Skin: denies easy bruising, redness, rash, hives, sun sensitive, skin tightness, nodules/bumps, hair loss, color changes of hands or feet in the cold (Raynaud's)  Neurologic: denies headaches, dizziness, numbness or tingling in hands/feet, memory loss, muscle weakness, fainting or loss of consciousness  Psychiatric: denies depression, excessive worries  Sleep: denies poor sleep (8 hours), snoring, apnea, daytime somnolence, difficulty falling asleep, difficulty staying asleep     PAST MEDICAL HISTORY    She has a past medical history of Diabetes ([facility] [address] Hypertension; Obesity, unspecified; Other and unspecified hyperlipidemia; and Unspecified vitamin D deficiency. FAMILY HISTORY    Her family history includes Cancer in an other family member. SOCIAL HISTORY    She reports that she quit smoking about 3 years ago. She has never used smokeless tobacco. She reports that she drinks alcohol. She reports that she does not use illicit drugs. GYNECOLOGIC HISTORY     3, Para 3, Living 3, Miscarriage 0    She denies severe pre-eclampsia, eclampsia or placental insufficiency    HEALTH MAINTENANCE    Immunizations  Immunization History   Administered Date(s) Administered    Influenza Vaccine 2013, 2015    Influenza Vaccine (Quad) 2016    Influenza Vaccine (Quad) PF 2017       Age Appropriate Cancer Screening    Colonoscopy:   PAP Smear:   Mammogram:     MEDICATIONS    Current Outpatient Prescriptions   Medication Sig Dispense Refill    diclofenac EC (VOLTAREN) 75 mg EC tablet Take  by mouth.  acetaminophen (TYLENOL EXTRA STRENGTH) 500 mg tablet Take  by mouth every six (6) hours as needed for Pain.       lisinopril (PRINIVIL, ZESTRIL) 10 mg tablet TAKE 1 TABLET EVERY DAY 90 Tab 1    triamterene-hydroCHLOROthiazide (DYAZIDE) 37.5-25 mg per capsule TAKE 1 CAPSULE EVERY DAY 90 Cap 1    alcohol swabs (BD SINGLE USE SWABS REGULAR) padm 1 Swab by Apply Externally route daily. 100 Pad 3    lancets (TRUEPLUS LANCETS) 30 gauge misc Use to test blood sugar once daily. Dx:E11.9 1 Package 3    Blood-Glucose Meter (TRUE METRIX AIR GLUCOSE METER) misc Use to test blood sugar once daily. Dx:E11.9 1 Each 0    glucose blood VI test strips (TRUE METRIX GLUCOSE TEST STRIP) strip Use to check blood sugar once daily. Dx: E11.9 100 Strip 3    gabapentin (NEURONTIN) 300 mg capsule TAKE 1 CAPSULE TWICE DAILY 180 Cap 3    metFORMIN (GLUCOPHAGE) 500 mg tablet TAKE 1 TABLET DAILY WITH MEALS 90 Tab 3    MAGNESIUM HYDROXIDE (MILK OF MAGNESIA PO)       VITAMIN D3 2,000 unit cap capsule Take 1 Cap by mouth daily.  aspirin 81 mg chewable tablet Take 81 mg by mouth daily. ALLERGIES    Allergies   Allergen Reactions    Codeine Other (comments)     \"Didn't like the way it made me feel\"    Lipitor [Atorvastatin] Other (comments)     cramps    Pcn [Penicillins] Itching       PHYSICAL EXAMINATION    Visit Vitals    /86 (BP 1 Location: Left arm, BP Patient Position: Sitting)    Pulse 79    Temp 98.2 °F (36.8 °C) (Oral)    Resp 16    Wt 227 lb 9.6 oz (103.2 kg)    SpO2 95%    BMI 37.87 kg/m2     Body mass index is 37.87 kg/(m^2). General: Patient is alert, oriented x 3, not in acute distress    HEENT:   Conjunctiva are not injected and appear moist, oral mucous membranes are moist, there are no ulcers present, there is no alopecia, neck is supple, there is no lymphadenopathy. Salivary glands are normal    Cardiovascular:  Heart is regular rate and rhythm, no murmurs. Chest:  Lungs are clear to auscultation bilaterally. Abdomen:  Soft, non-tender    Extremities:  Free of clubbing, cyanosis, edema, extremities well perfused. Neurological exam:  No focal sensory deficits, muscle strength is full in upper and lower extremities.     Skin exam:  There are no rashes, no tophi, no psoriasis, no active Raynaud's, no livedo reticularis, no periungual erythema. Musculoskeletal exam:  A comprehensive musculoskeletal exam was performed for all joints of each upper and lower extremity and assessed for swelling, tenderness and range of motion. Pertinent results are documented as below:    Bilateral knee crepitus without effusion. No synovitis    DATA REVIEW    Prior medical records were reviewed and are summarized as below:    Laboratory data: summarized in the HPI    Imaging: summarized in the HPI. ASSESSMENT AND PLAN    1) Fibromyalgia. Her history, constellation of symptoms, and examination are consistent with a pain syndrome. Fibromyalgia is a disease characterized by chronic widespread musculoskeletal pain. Fibromyalgia is caused by abnormal processing of pain signals in the central nervous system, leading to exaggerated pain responses. Non-pharmacologic therapies such as cardiovascular exercise and Cognitive Behavioral Therapy have been shown to be of benefit ([facility], Am J Med 2009). Kofi Chi in particular has proven efficacy in the treatment of fibromyalgia WendieMAICO Mcmillan 2010). If pharmacotherapy is pursued, pregabalin (Lyrica), gabapentin (Neurontin), milnacipran (Kwaxuma), and duloxetine (Cymbalta) are FDA approved medications for the treatment of fibromyalgia. Narcotics have not been proven to be efficacious in the treatment of fibromyalgia. In fact, narcotic use in this patient population has been observed to exacerbate depression, and may enhance the hyperalgesia which is characteristic of this condition (Harlon Hockey Rheum 2006). They also are at increased risk for opioid-induced hyperalgesia due predominantly to central sensitization Waldo Mcginnis al. [facility] Clin Rheumatol. 2013 Mar;19(2):72-7).  Specifically, a double-blind placebo-controlled trial by Casey galeana published in 1995 demonstrated that intravenous morphine did not reduce pain in fibromyalgia patients. A study by Marianela Martinez al published in 2003 showed that fibromyalgia patients taking oral opiates did not experience improvement in their pain at four years of follow up, and also reported increased depression over the last two years of the study. There is subsequent concern that the prolonged use of narcotics to treat fibromyalgia may cause harm to these patients Ingrid Marques, Pain 2005). Finally, opioid use in fibromyalgia had poorer symptoms and functional and occupational status compared to nonusers (Kendy BRADSHAW et al. Pain Res Treat. 3137;1440:133088). We therefore recommend that narcotics be avoided in all patients with fibromyalgia. We recommend Kofi Chi stretching exercises for at least 30 minutes per day. The Arthritis [employer] has made a videotape of Kofi Chi that She can borrow from [employer], purchase online or watch for free on [email or web address]. com Kofi Chi for Arthritis. We discussed treating secondary causes, such as sleep apnea, poor sleep quality, depression, anxiety, weight loss, vitamin deficiencies, such as vitamin D, and pursuing aquatherapy. I encouraged her to do Y[address]ie [address]. My recommendations were provided to her and she was informed to follow up with her primary care physician for further management of her fibromyalgia. She has not had relief from gabapentin, should may not need to continue it. 2) Bilateral Knee Osteoarthritis. The patient has osteoarthritis, which is also known as \"wear and tear arthritis,\" non-inflammatory arthritis or mechanical arthritis. There are hereditary, vocational and posttraumatic joint injuries predisposing factors.  I recommend maintaining a healthy weight to slow the progression of osteoarthritis in addition to following the [employer] of Rheumatology Osteoarthritis Treatment Guidelines: (1) non-pharmacologic modalities such as aerobic, aquatic, and/or resistance exercises as well as weight loss for overweight patients; in addition to (2) pharmacologic modalities such as acetaminophen as first line, oral and topical NSAIDs as second line, tramadol as third line and intra-articular corticosteroid injections as fourth line (Boyd MC, et al. Arthritis Care Res [facility]). 2012;64(4):465). Naproxen is a low CALHOUN-2 selectivity inhibitor that poses lower cardiovascular risk than other NSAIDs (Angiomarcelo HURLEY, Yang WELSH. Clinical Pharmacology and Cardiovascular Safety of Naproxen. Am J Cardiovasc Drugs. 2016 Nov 8). NSAIDs should not be used in patients on blood thinners, chronic kidney disease, high risk coronary artery disease, and inflammatory bowel disease (ulcerative colitis or Crohn's disease) Joint replacement surgery if all previous fail, knowing that there is a 10 year lifespan per prosthesis. I recommend weight loss because every 1 lb of extra weight is approximately 5 lbs of extra weight on the knees. I asked the patient to count their daily caloric intake and try not to exceed 2772-9410 calories. I asked the patient to perform at least 30 minutes of physical exercise per day, such as walking, climbing stairs, or swimming. If they have access to a pool, I recommend walking in the pool for at least 30 minutes every day. Performing at least 8 weeks of yoga (two 60-minute classes and 1 home practice per week) was shown to help individuals with arthritis safely increase physical activity, and improve physical and psychological health [facility] et al. Jamie Sheikh Rheumatol. 2015 Jul;42(7):1194-202). 3) Obesity. Her BMI was 37.87. Weight loss is encouraged. See #2. 4) Chronic Back Pain. She asks about a back injection. I defer to her orthopedic surgeon. The patient voiced understanding of the aforementioned assessment and plan. Summary of plan was provided in the After Visit Summary patient instructions. I also provided education about MyChart setup and utility.     TODAY'S ORDERS    Orders Placed This Encounter    diclofenac EC (VOLTAREN) 75 mg EC tablet    acetaminophen (TYLENOL EXTRA STRENGTH) 500 mg tablet       No future appointments.     Sang Diaz MD, [address] [facility]    Adult Rheumatology   Musculoskeletal Ultrasound Certified  [address] [facility], [place], [address] [place] [address]   Phone [phone]  Fax [phone]

## 2018-05-08 ENCOUNTER — OFFICE VISIT (OUTPATIENT)
Dept: FAMILY MEDICINE CLINIC | Age: 68
End: 2018-05-08

## 2018-05-08 VITALS
RESPIRATION RATE: 18 BRPM | HEIGHT: 65 IN | BODY MASS INDEX: 37.49 KG/M2 | HEART RATE: 90 BPM | OXYGEN SATURATION: 94 % | TEMPERATURE: 98.6 F | WEIGHT: 225 LBS | SYSTOLIC BLOOD PRESSURE: 126 MMHG | DIASTOLIC BLOOD PRESSURE: 67 MMHG

## 2018-05-08 DIAGNOSIS — M54.9 CHRONIC BACK PAIN GREATER THAN 3 MONTHS DURATION: ICD-10-CM

## 2018-05-08 DIAGNOSIS — G89.29 CHRONIC BACK PAIN GREATER THAN 3 MONTHS DURATION: ICD-10-CM

## 2018-05-08 DIAGNOSIS — E78.00 HYPERCHOLESTEREMIA: ICD-10-CM

## 2018-05-08 DIAGNOSIS — M17.0 PRIMARY OSTEOARTHRITIS OF BOTH KNEES: ICD-10-CM

## 2018-05-08 DIAGNOSIS — E11.40 TYPE 2 DIABETES MELLITUS WITH DIABETIC NEUROPATHY, WITHOUT LONG-TERM CURRENT USE OF INSULIN (HCC): Primary | ICD-10-CM

## 2018-05-08 DIAGNOSIS — I10 ESSENTIAL HYPERTENSION: ICD-10-CM

## 2018-05-08 DIAGNOSIS — M19.90 ARTHRITIS: ICD-10-CM

## 2018-05-08 PROBLEM — E66.01 SEVERE OBESITY (BMI 35.0-39.9) WITH COMORBIDITY (HCC): Status: ACTIVE | Noted: 2018-05-08

## 2018-05-08 NOTE — PROGRESS NOTES
Chief Complaint   Patient presents with    Leg Pain     bilateral     she is a 75y.o. year old female who presents for evalution. She is here today to get check up and needs blood done. Has none since dec 2016  She has pain in the lower back, both knees and the left shoulder  She has a Rx for PT   She does not take the gabapentin, she takes the diclofenac and wants moere. I had told her to stop that due to her kidneys but she is asking for mre although she understands the possible risk  She has stairs in her home and she does not have SOB or chest pain whe she climbs them    Reviewed PmHx, RxHx, FmHx, SocHx, AllgHx and updated and dated in the chart. Aspirin yes ____   No____ N/A____    Patient Active Problem List    Diagnosis    Severe obesity (BMI 35.0-39. 9) with comorbidity (HCC)    Fibromyalgia    Primary osteoarthritis of both knees    Obesity (BMI 30-39. 9)    Chronic back pain greater than 3 months duration    Type 2 diabetes mellitus with diabetic neuropathy (HCC)    Controlled type 2 diabetes mellitus without complication, without long-term current use of insulin ([address]    Diabetes mellitus type 2, controlled ([address]    Arthritis    Hypercholesteremia    Essential hypertension    Insomnia    Hx of diabetes mellitus       Nurse notes were reviewed and copied and are correct  Review of Systems - negative except as listed above in the HPI    Objective:     Vitals:    05/08/18 1001   BP: 126/67   Pulse: 90   Resp: 18   Temp: 98.6 °F (37 °C)   TempSrc: Oral   SpO2: 94%   Weight: 225 lb (102.1 kg)   Height: 5' 5\" (1.651 m)      Physical Examination: General appearance - alert, well appearing, and in no distress  Mental status - alert, oriented to person, place, and time  Chest - clear to auscultation, no wheezes, rales or rhonchi, symmetric air entry  Heart - normal rate, regular rhythm, normal S1, S2, no murmurs, rubs, clicks or gallops  Musculoskeletal - no joint tenderness, deformity or swelling  Extremities - peripheral pulses normal, no pedal edema, no clubbing or cyanosis      Assessment/ Plan:   Diagnoses and all orders for this visit:    1. Type 2 diabetes mellitus with diabetic neuropathy, without long-term current use of insulin (HCC)  -     HEMOGLOBIN A1C WITH EAG  We discussed her current diet asnd the fact that it includes a lot of  junk foods  She will cut down  2. Essential hypertension  -     METABOLIC PANEL, COMPREHENSIVE  bp under great control  3. Hypercholesteremia  -     LIPID PANEL  Cont to monitor  4. Arthritis  -     REFERRAL TO PHYSICAL THERAPY    5. Primary osteoarthritis of both knees  -     REFERRAL TO PHYSICAL THERAPY    6. Chronic back pain greater than 3 months duration  -     REFERRAL TO PHYSICAL THERAPY       Follow-up Disposition: Not on File    ICD-10-CM ICD-9-CM    1. Type 2 diabetes mellitus with diabetic neuropathy, without long-term current use of insulin (HCC) E11.40 250.60 HEMOGLOBIN A1C WITH EAG     357.2    2. Essential hypertension L54 309.9 METABOLIC PANEL, COMPREHENSIVE   3. Hypercholesteremia E78.00 272.0 LIPID PANEL   4. Arthritis M19.90 716.90 REFERRAL TO PHYSICAL THERAPY   5. Primary osteoarthritis of both knees M17.0 715.16 REFERRAL TO PHYSICAL THERAPY   6. Chronic back pain greater than 3 months duration M54.9 724.5 REFERRAL TO PHYSICAL THERAPY    G89.29 338.29        I have discussed the diagnosis with the patient and the intended plan as seen in the above orders. The patient has received an after-visit summary and questions were answered concerning future plans. Medication Side Effects and Warnings were discussed with patient: yes  Patient Labs were reviewed and or requested: yes  Patient Past Records were reviewed and or requested: yes        There are no Patient Instructions on file for this visit.     The patient verbalizes understanding and agrees with the plan of care        Patient has the advanced directives booklet to review

## 2018-05-08 NOTE — PROGRESS NOTES
1. Have you been to the ER, urgent care clinic since your last visit? Hospitalized since your last visit? No    2. Have you seen or consulted any other health care providers outside of the [address] [place] [address] since your last visit? Include any pap smears or colon screening.  No      Chief Complaint   Patient presents with    Leg Pain     bilateral

## 2018-05-09 LAB
ALBUMIN SERPL-MCNC: 4.3 G/DL (ref 3.6–4.8)
ALBUMIN/GLOB SERPL: 1.2 {RATIO} (ref 1.2–2.2)
ALP SERPL-CCNC: 81 IU/L (ref 39–117)
ALT SERPL-CCNC: 17 IU/L (ref 0–32)
AST SERPL-CCNC: 22 IU/L (ref 0–40)
BILIRUB SERPL-MCNC: <0.2 MG/DL (ref 0–1.2)
BUN SERPL-MCNC: 18 MG/DL (ref 8–27)
BUN/CREAT SERPL: 20 (ref 12–28)
CALCIUM SERPL-MCNC: 9.9 MG/DL (ref 8.7–10.3)
CHLORIDE SERPL-SCNC: 101 MMOL/L (ref 96–106)
CHOLEST SERPL-MCNC: 201 MG/DL (ref 100–199)
CO2 SERPL-SCNC: 21 MMOL/L (ref 18–29)
CREAT SERPL-MCNC: 0.92 MG/DL (ref 0.57–1)
EST. AVERAGE GLUCOSE BLD GHB EST-MCNC: 137 MG/DL
GFR SERPLBLD CREATININE-BSD FMLA CKD-EPI: 64 ML/MIN/1.73
GFR SERPLBLD CREATININE-BSD FMLA CKD-EPI: 74 ML/MIN/1.73
GLOBULIN SER CALC-MCNC: 3.5 G/DL (ref 1.5–4.5)
GLUCOSE SERPL-MCNC: 111 MG/DL (ref 65–99)
HBA1C MFR BLD: 6.4 % (ref 4.8–5.6)
HDLC SERPL-MCNC: 43 MG/DL
INTERPRETATION, 910389: NORMAL
LDLC SERPL CALC-MCNC: 133 MG/DL (ref 0–99)
Lab: NORMAL
POTASSIUM SERPL-SCNC: 4.2 MMOL/L (ref 3.5–5.2)
PROT SERPL-MCNC: 7.8 G/DL (ref 6–8.5)
SODIUM SERPL-SCNC: 143 MMOL/L (ref 134–144)
TRIGL SERPL-MCNC: 125 MG/DL (ref 0–149)
VLDLC SERPL CALC-MCNC: 25 MG/DL (ref 5–40)

## 2018-05-23 DIAGNOSIS — E11.9 CONTROLLED TYPE 2 DIABETES MELLITUS WITHOUT COMPLICATION, WITHOUT LONG-TERM CURRENT USE OF INSULIN (HCC): ICD-10-CM

## 2018-05-23 RX ORDER — METFORMIN HYDROCHLORIDE 500 MG/1
TABLET ORAL
Qty: 90 TAB | Refills: 3 | Status: SHIPPED | OUTPATIENT
Start: 2018-05-23 | End: 2019-09-19 | Stop reason: SDUPTHER

## 2018-05-23 RX ORDER — DICLOFENAC SODIUM 75 MG/1
75 TABLET, DELAYED RELEASE ORAL 2 TIMES DAILY
Qty: 60 TAB | Refills: 3 | Status: SHIPPED | OUTPATIENT
Start: 2018-05-23 | End: 2018-05-24

## 2018-05-23 NOTE — PROGRESS NOTES
The liver and kidney are normal  The blood sugar test has not changed-continue the same dose of medicine  The cholesterol has gotten a little worse.  As we discussed, it is important to avoid junk foods and fried foods  Everything needs to be repeated in 3 months

## 2018-05-24 DIAGNOSIS — G89.29 CHRONIC BACK PAIN GREATER THAN 3 MONTHS DURATION: Primary | ICD-10-CM

## 2018-05-24 DIAGNOSIS — M54.9 CHRONIC BACK PAIN GREATER THAN 3 MONTHS DURATION: Primary | ICD-10-CM

## 2018-05-24 RX ORDER — DICLOFENAC SODIUM 75 MG/1
75 TABLET, DELAYED RELEASE ORAL 2 TIMES DAILY
Qty: 180 TAB | Refills: 0 | Status: SHIPPED | OUTPATIENT
Start: 2018-05-24 | End: 2018-11-09 | Stop reason: ALTCHOICE

## 2018-10-24 ENCOUNTER — OFFICE VISIT (OUTPATIENT)
Dept: FAMILY MEDICINE CLINIC | Age: 68
End: 2018-10-24

## 2018-10-24 VITALS
OXYGEN SATURATION: 96 % | SYSTOLIC BLOOD PRESSURE: 136 MMHG | RESPIRATION RATE: 19 BRPM | HEIGHT: 65 IN | HEART RATE: 76 BPM | BODY MASS INDEX: 36.82 KG/M2 | DIASTOLIC BLOOD PRESSURE: 78 MMHG | TEMPERATURE: 98.3 F | WEIGHT: 221 LBS

## 2018-10-24 DIAGNOSIS — E78.2 MIXED HYPERLIPIDEMIA: ICD-10-CM

## 2018-10-24 DIAGNOSIS — M54.9 BACK PAIN, UNSPECIFIED BACK LOCATION, UNSPECIFIED BACK PAIN LATERALITY, UNSPECIFIED CHRONICITY: ICD-10-CM

## 2018-10-24 DIAGNOSIS — I10 ESSENTIAL HYPERTENSION: ICD-10-CM

## 2018-10-24 DIAGNOSIS — J30.89 NON-SEASONAL ALLERGIC RHINITIS, UNSPECIFIED TRIGGER: ICD-10-CM

## 2018-10-24 DIAGNOSIS — E66.01 SEVERE OBESITY (BMI 35.0-39.9) WITH COMORBIDITY (HCC): ICD-10-CM

## 2018-10-24 DIAGNOSIS — Z23 ENCOUNTER FOR IMMUNIZATION: ICD-10-CM

## 2018-10-24 DIAGNOSIS — M17.0 PRIMARY OSTEOARTHRITIS OF BOTH KNEES: ICD-10-CM

## 2018-10-24 DIAGNOSIS — E11.9 CONTROLLED TYPE 2 DIABETES MELLITUS WITHOUT COMPLICATION, WITHOUT LONG-TERM CURRENT USE OF INSULIN (HCC): Primary | ICD-10-CM

## 2018-10-24 RX ORDER — PREDNISONE 10 MG/1
TABLET ORAL
Qty: 20 TAB | Refills: 0 | Status: SHIPPED | OUTPATIENT
Start: 2018-10-24 | End: 2019-04-23

## 2018-10-24 RX ORDER — CETIRIZINE HCL 10 MG
10 TABLET ORAL DAILY
Qty: 30 TAB | Refills: 3 | Status: SHIPPED | OUTPATIENT
Start: 2018-10-24 | End: 2019-04-23

## 2018-10-24 NOTE — PROGRESS NOTES
1. Have you been to the ER, urgent care clinic since your last visit? Hospitalized since your last visit? No 
 
2. Have you seen or consulted any other health care providers outside of the [address] [place] [address] since your last visit? Include any pap smears or colon screening. No  
 
Chief Complaint Patient presents with  Follow-up  Leg Pain   BLE

## 2018-10-24 NOTE — PROGRESS NOTES
Chief Complaint Patient presents with  Follow-up  Leg Pain BLE  
 
she is a 70y.o. year old female who presents for evalution. she is going to water aerobics  3 days a week She is still having leg pain. She has MRI showing spinal stenosis , she went to ortho and they offered injection but she refused it Now she wants to discuss that again Reviewed PmHx, RxHx, FmHx, SocHx, AllgHx and updated and dated in the chart. Aspirin yes ____   No____ N/A____ Patient Active Problem List  
 Diagnosis  Type 2 diabetes with nephropathy ([address]  Severe obesity (BMI 35.0-39. 9) with comorbidity ([address]  Fibromyalgia  Primary osteoarthritis of both knees  Obesity (BMI 30-39. 9)  Chronic back pain greater than 3 months duration  Type 2 diabetes mellitus with diabetic neuropathy (HCC)  Controlled type 2 diabetes mellitus without complication, without long-term current use of insulin ([address]  Diabetes mellitus type 2, controlled ([facility]  Arthritis  Hypercholesteremia  Essential hypertension  Insomnia  Hx of diabetes mellitus Nurse notes were reviewed and copied and are correct Review of Systems - negative except as listed above in the HPI Objective:  
 
Vitals:  
 10/24/18 1524 BP: 136/78 Pulse: 76 Resp: 19 Temp: 98.3 °F (36.8 °C) TempSrc: Oral  
SpO2: 96% Weight: 221 lb (100.2 kg) Height: 5' 5\" (1.651 m) Physical Examination: General appearance - alert, well appearing, and in no distress Mental status - alert, oriented to person, place, and time Chest - clear to auscultation, no wheezes, rales or rhonchi, symmetric air entry Heart - normal rate, regular rhythm, normal S1, S2, no murmurs, rubs, clicks or gallops Extremities - peripheral pulses normal, no pedal edema, no clubbing or cyanosis Skin: no rashes Neuro: no focal deficits MS:no deformity Assessment/ Plan:  
Diagnoses and all orders for this visit: 
 
 1. Controlled type 2 diabetes mellitus without complication, without long-term current use of insulin (HCC) 
-     HEMOGLOBIN A1C WITH EAG 2. Encounter for immunization 
-     INFLUENZA VIRUS VACCINE, HIGH DOSE SEASONAL, PRESERVATIVE FREE 
-     ADMIN INFLUENZA VIRUS VAC 3. Essential hypertension -     METABOLIC PANEL, COMPREHENSIVE 4. Primary osteoarthritis of both knees 5. Back pain, unspecified back location, unspecified back pain laterality, unspecified chronicity 
-     REFERRAL TO ORTHOPEDICS 6. Non-seasonal allergic rhinitis, unspecified trigger 
-     cetirizine (ZYRTEC) 10 mg tablet; Take 1 Tab by mouth daily. -     predniSONE (DELTASONE) 10 mg tablet; Take 4 tab ea day for 2 days, then 3 tab ea day for 2 days , then 2 tab ea day for 2 days, then 1 tab ea day for 2 days Having a lot of challenges from the allerges. She is insisting on steroids which helped in the past 
Given steroids but stressed the importance of avoiding sweets and starches 7. Mixed hyperlipidemia -     LIPID PANEL Checking current status 8. Severe obesity (BMI 35.0-39. 9) with comorbidity ([address] Discussed the lifestyle changs that she needs to make and be conmsitent with-avoiding junk foods, fast food and fried foods and limit sugar Other orders -     CVD REPORT 
-     CKD REPORT 
-     DIABETES PATIENT EDUCATION Follow-up Disposition: 
Return in about 3 months (around 1/24/2019). ICD-10-CM ICD-9-CM 1. Controlled type 2 diabetes mellitus without complication, without long-term current use of insulin (HCC) E11.9 250.00 HEMOGLOBIN A1C WITH EAG 2. Encounter for immunization Z23 V03.89 INFLUENZA VIRUS VACCINE, HIGH DOSE SEASONAL, PRESERVATIVE FREE  
   ADMIN INFLUENZA VIRUS VAC 3. Essential hypertension V81 868.7 METABOLIC PANEL, COMPREHENSIVE 4. Primary osteoarthritis of both knees M17.0 715.16   
5.  Back pain, unspecified back location, unspecified back pain laterality, unspecified chronicity M54.9 724.5 REFERRAL TO ORTHOPEDICS 6. Non-seasonal allergic rhinitis, unspecified trigger J30.89 477.8 cetirizine (ZYRTEC) 10 mg tablet  
   predniSONE (DELTASONE) 10 mg tablet 7. Mixed hyperlipidemia E78.2 272.2 LIPID PANEL 8. Severe obesity (BMI 35.0-39. 9) with comorbidity ([address] [facility] E66.01 278.01 I have discussed the diagnosis with the patient and the intended plan as seen in the above orders. The patient has received an after-visit summary and questions were answered concerning future plans. Medication Side Effects and Warnings were discussed with patient: yes Patient Labs were reviewed and or requested: yes Patient Past Records were reviewed and or requested: yes There are no Patient Instructions on file for this visit. The patient verbalizes understanding and agrees with the plan of care Patient has the advanced directives booklet to review

## 2018-10-25 LAB
ALBUMIN SERPL-MCNC: 4.4 G/DL (ref 3.6–4.8)
ALBUMIN/GLOB SERPL: 1.3 {RATIO} (ref 1.2–2.2)
ALP SERPL-CCNC: 86 IU/L (ref 39–117)
ALT SERPL-CCNC: 14 IU/L (ref 0–32)
AST SERPL-CCNC: 18 IU/L (ref 0–40)
BILIRUB SERPL-MCNC: 0.4 MG/DL (ref 0–1.2)
BUN SERPL-MCNC: 17 MG/DL (ref 8–27)
BUN/CREAT SERPL: 17 (ref 12–28)
CALCIUM SERPL-MCNC: 9.7 MG/DL (ref 8.7–10.3)
CHLORIDE SERPL-SCNC: 102 MMOL/L (ref 96–106)
CHOLEST SERPL-MCNC: 215 MG/DL (ref 100–199)
CO2 SERPL-SCNC: 25 MMOL/L (ref 20–29)
CREAT SERPL-MCNC: 0.99 MG/DL (ref 0.57–1)
EST. AVERAGE GLUCOSE BLD GHB EST-MCNC: 140 MG/DL
GLOBULIN SER CALC-MCNC: 3.5 G/DL (ref 1.5–4.5)
GLUCOSE SERPL-MCNC: 84 MG/DL (ref 65–99)
HBA1C MFR BLD: 6.5 % (ref 4.8–5.6)
HDLC SERPL-MCNC: 46 MG/DL
INTERPRETATION, 910389: NORMAL
INTERPRETATION: NORMAL
LDLC SERPL CALC-MCNC: 148 MG/DL (ref 0–99)
Lab: NORMAL
PDF IMAGE, 910387: NORMAL
POTASSIUM SERPL-SCNC: 4.2 MMOL/L (ref 3.5–5.2)
PROT SERPL-MCNC: 7.9 G/DL (ref 6–8.5)
SODIUM SERPL-SCNC: 143 MMOL/L (ref 134–144)
TRIGL SERPL-MCNC: 103 MG/DL (ref 0–149)
VLDLC SERPL CALC-MCNC: 21 MG/DL (ref 5–40)

## 2018-10-25 RX ORDER — PREDNISONE 10 MG/1
TABLET ORAL
Qty: 20 TAB | Refills: 0 | OUTPATIENT
Start: 2018-10-25

## 2018-10-25 NOTE — TELEPHONE ENCOUNTER
Patient is calling about Rx's by  for Prednizone and another New Rx for sinus, patient was seen yesterday 10.24.18

## 2018-10-26 ENCOUNTER — TELEPHONE (OUTPATIENT)
Dept: FAMILY MEDICINE CLINIC | Age: 68
End: 2018-10-26

## 2018-10-26 NOTE — TELEPHONE ENCOUNTER
Spoke with Say Young the Pharmacist from [facility] and provided with Rx's for Zyrtec 10mg qty of 30 with 3 refills to be taken once daily. Also, VO for Prednisone tapered dose. MD notified that pt requested to fill medications at [facility] instead of [facility].

## 2018-10-28 NOTE — PROGRESS NOTES
The blood sugar is still in good control The cholesterol is not at goal, it is still higher than is desired. It is at 148 and the goal is to get it below 70. Avoid junk foods and fast food. A senior exercise class would also be helpful. Medicare can cover a gym membership.  
The kidney and liver are normal

## 2018-10-29 NOTE — PROGRESS NOTES
Spoke with pt advised of lab results/recommendaitons. Pt verbalized understanding and stated that she is currently doing water aerobics. No further questions.

## 2018-10-31 PROBLEM — E11.21 TYPE 2 DIABETES WITH NEPHROPATHY (HCC): Status: ACTIVE | Noted: 2018-10-31

## 2018-11-09 ENCOUNTER — OFFICE VISIT (OUTPATIENT)
Dept: FAMILY MEDICINE CLINIC | Age: 68
End: 2018-11-09

## 2018-11-09 VITALS
BODY MASS INDEX: 36.94 KG/M2 | SYSTOLIC BLOOD PRESSURE: 131 MMHG | HEART RATE: 76 BPM | WEIGHT: 221.7 LBS | TEMPERATURE: 98.2 F | RESPIRATION RATE: 18 BRPM | DIASTOLIC BLOOD PRESSURE: 82 MMHG | OXYGEN SATURATION: 95 % | HEIGHT: 65 IN

## 2018-11-09 DIAGNOSIS — M79.672 LEFT FOOT PAIN: Primary | ICD-10-CM

## 2018-11-09 RX ORDER — MELOXICAM 7.5 MG/1
7.5 TABLET ORAL DAILY
Qty: 30 TAB | Refills: 1 | Status: SHIPPED | OUTPATIENT
Start: 2018-11-09 | End: 2019-04-23

## 2018-11-09 NOTE — PROGRESS NOTES
Ede Rodríguez is a 70 y.o. female    Chief Complaint   Patient presents with    Foot Problem     left foot painful x 5 days top and bottom of foot       1. Have you been to the ER, urgent care clinic since your last visit? Hospitalized since your last visit? No  M  2. Have you seen or consulted any other health care providers outside of the [address] [place] [address] since your last visit? Include any pap smears or colon screening. No      Visit Vitals  /82 (BP 1 Location: Left arm, BP Patient Position: Sitting)   Pulse 76   Temp 98.2 °F (36.8 °C) (Oral)   Resp 18   Ht 5' 5\" (1.651 m)   Wt 221 lb 11.2 oz (100.6 kg)   SpO2 95%   BMI 36.89 kg/m²           Health Maintenance Due   Topic Date Due    EYE EXAM RETINAL OR DILATED Q1  05/28/1960    Shingrix Vaccine Age 50> (1 of 2) 05/28/1999    FOBT Q 1 YEAR AGE 50-75  11/30/2012    FOOT EXAM Q1  04/29/2017    MICROALBUMIN Q1  04/29/2017    GLAUCOMA SCREENING Q2Y  05/29/2017    MEDICARE YEARLY EXAM  03/14/2018         Medication Reconciliation completed, changes noted.   Please  Update medication list.

## 2018-11-09 NOTE — PROGRESS NOTES
Cezar Carias is a 70 y.o. female who presents with the following complaints:  Chief Complaint   Patient presents with    Foot Problem     left foot painful x 5 days top and bottom of foot       Subjective:    HPI:   C/o left foot pain x 3-4 days, worse at night with throbbing. Pain is worst in the am when she first stands up, gets better as she walks more. Diclofenac is not helping, has also tried arthritis OTC cream. The pain has gotten progressively better today. She had an epidural injection for pain management earlier today. Can recall no injury or other trauma to the foot. No redness or swelling. Her daughter takes meloxicam, she wonders if this would be more effective than diclofenac.     Pertinent PMH/FH/SH:  Past Medical History:   Diagnosis Date    Diabetes ([facility]     Hypertension     Obesity, unspecified     Other and unspecified hyperlipidemia     Unspecified vitamin D deficiency      Past Surgical History:   Procedure Laterality Date    HX HYSTERECTOMY  25     Family History   Problem Relation Age of Onset    Cancer Other         breast     Social History     Socioeconomic History    Marital status:      Spouse name: Not on file    Number of children: Not on file    Years of education: Not on file    Highest education level: Not on file   Social Needs    Financial resource strain: Not on file    Food insecurity - worry: Not on file    Food insecurity - inability: Not on file   [employer] needs - medical: Not on file   [employer] needs - non-medical: Not on file   Occupational History    Not on file   Tobacco Use    Smoking status: Former Smoker     Last attempt to quit: 1/20/2015     Years since quitting: 3.8    Smokeless tobacco: Never Used   Substance and Sexual Activity    Alcohol use: Yes     Comment: socially    Drug use: No    Sexual activity: No   Other Topics Concern    Not on file   Social History Narrative    Not on file     Advanced Directives: N      Patient Active Problem List    Diagnosis    Type 2 diabetes with nephropathy ([address]    Severe obesity (BMI 35.0-39. 9) with comorbidity (HCC)    Fibromyalgia    Primary osteoarthritis of both knees    Obesity (BMI 30-39. 9)    Chronic back pain greater than 3 months duration    Type 2 diabetes mellitus with diabetic neuropathy (HCC)    Controlled type 2 diabetes mellitus without complication, without long-term current use of insulin ([address]    Diabetes mellitus type 2, controlled ([address]    Arthritis    Hypercholesteremia    Essential hypertension    Insomnia    Hx of diabetes mellitus       Nurse notes were reviewed and are correct  Review of Systems - negative except as listed above in the HPI    Objective:     Vitals:    11/09/18 1259   BP: 131/82   Pulse: 76   Resp: 18   Temp: 98.2 °F (36.8 °C)   TempSrc: Oral   SpO2: 95%   Weight: 221 lb 11.2 oz (100.6 kg)   Height: 5' 5\" (1.651 m)     Physical Examination: General appearance - alert, well appearing, and in no distress, oriented to person, place, and time and well hydrated  Mental status - normal mood, behavior, speech, dress, motor activity, and thought processes  Neck - supple, no significant adenopathy  Chest - clear to auscultation, no wheezes, rales or rhonchi, symmetric air entry  Heart - normal rate, regular rhythm, normal S1, S2, no murmurs, rubs, clicks or gallops  Abdomen - soft, nontender, nondistended, no masses or organomegaly  Neurological - alert, oriented, normal speech, no focal findings or movement disorder noted  Musculoskeletal - left foot nontender, no joint deformity, no increased joint warmth. 2+ DP, PT, normal cap refill. No soft tissue edema or ecchymosis. Normal color. Extremities - no pedal edema noted  Skin - normal coloration and turgor, no rashes    Assessment/ Plan:   Diagnoses and all orders for this visit:    1.  Left foot pain  Plantar fasciitis vs diabetic neuropathy vs lumbar radiculopathy  Add meloxicam, discontinue diclofenac  Exercises  Where supportive footwear from the time feet hit the floor in the am until back in bed, no walking barefoot or in flip flops/slippers  Monitor response  -     meloxicam (MOBIC) 7.5 mg tablet; Take 1 Tab by mouth daily. Follow-up Disposition: 2-3 weeks if not improving    I have discussed the diagnosis with the patient and the intended plan as seen in the above orders. The patient has received an after-visit summary and questions were answered concerning future plans. The patient verbalizes understanding. Medication Side Effects and Warnings were discussed with patient: yes  Patient Labs were reviewed and or requested: yes  Patient Past Records were reviewed and or requested: yes    Patient Instructions          Plantar Fasciitis: Care Instructions  Your Care Instructions    Plantar fasciitis is pain and inflammation of the plantar fascia, the tissue at the bottom of your foot that connects the heel bone to the toes. The plantar fascia also supports the arch. If you strain the plantar fascia, it can develop small tears and cause heel pain when you stand or walk. Plantar fasciitis can be caused by running or other sports. It also may occur in people who are overweight or who have high arches or flat feet. You may get plantar fasciitis if you walk or stand for long periods, or have a tight Achilles tendon or calf muscles. You can improve your foot pain with rest and other care at home. It might take a few weeks to a few months for your foot to heal completely. Follow-up care is a key part of your treatment and safety. Be sure to make and go to all appointments, and call your doctor if you are having problems. It's also a good idea to know your test results and keep a list of the medicines you take. How can you care for yourself at home? · Rest your feet often. Reduce your activity to a level that lets you avoid pain.  If possible, do not run or walk on hard surfaces. · Take pain medicines exactly as directed. ? If the doctor gave you a prescription medicine for pain, take it as prescribed. ? If you are not taking a prescription pain medicine, take an over-the-counter anti-inflammatory medicine for pain and swelling, such as ibuprofen (Advil, Motrin) or naproxen (Aleve). Read and follow all instructions on the label. · Use ice massage to help with pain and swelling. You can use an ice cube or an ice cup several times a day. To make an ice cup, fill a paper cup with water and freeze it. Cut off the top of the cup until a half-inch of ice shows. Hold onto the remaining paper to use the cup. Rub the ice in small circles over the area for 5 to 7 minutes. · Contrast baths, which alternate hot and cold water, can also help reduce swelling. But because heat alone may make pain and swelling worse, end a contrast bath with a soak in cold water. · Wear a night splint if your doctor suggests it. A night splint holds your foot with the toes pointed up and the foot and ankle at a 90-degree angle. This position gives the bottom of your foot a constant, gentle stretch. · Do simple exercises such as calf stretches and towel stretches 2 to 3 times each day, especially when you first get up in the morning. These can help the plantar fascia become more flexible. They also make the muscles that support your arch stronger. Hold these stretches for 15 to 30 seconds per stretch. Repeat 2 to 4 times. ? Stand about 1 foot from a wall. Place the palms of both hands against the wall at chest level. Lean forward against the wall, keeping one leg with the knee straight and heel on the ground while bending the knee of the other leg.  ? Sit down on the floor or a mat with your feet stretched in front of you. Roll up a towel lengthwise, and loop it over the ball of your foot. Holding the towel at both ends, gently pull the towel toward you to stretch your foot.   · Wear shoes with good arch support. Athletic shoes or shoes with a well-cushioned sole are good choices. · Try heel cups or shoe inserts (orthotics) to help cushion your heel. You can buy these at many shoe stores. · Put on your shoes as soon as you get out of bed. Going barefoot or wearing slippers may make your pain worse. · Reach and stay at a good weight for your height. This puts less strain on your feet. When should you call for help? Call your doctor now or seek immediate medical care if:    · You have heel pain with fever, redness, or warmth in your heel.     · You cannot put weight on the sore foot.    Watch closely for changes in your health, and be sure to contact your doctor if:    · You have numbness or tingling in your heel.     · Your heel pain lasts more than 2 weeks. Where can you learn more? Go to http://ora-claude.info/. Mila Dial in the search box to learn more about \"Plantar Fasciitis: Care Instructions. \"  Current as of: November 29, 2017  Content Version: 11.8  © 9572-9514 [employer]. Care instructions adapted under license by [employer] (which disclaims liability or warranty for this information). If you have questions about a medical condition or this instruction, always ask your healthcare professional. [address] any warranty or liability for your use of this information. Plantar Fasciitis: Exercises  Your Care Instructions  Here are some examples of typical rehabilitation exercises for your condition. Start each exercise slowly. Ease off the exercise if you start to have pain. Your doctor or physical therapist will tell you when you can start these exercises and which ones will work best for you. How to do the exercises  Towel stretch    1. Sit with your legs extended and knees straight. 2. Place a towel around your foot just under the toes.   3. Hold each end of the towel in each hand, with your hands above your knees.  4. Pull back with the towel so that your foot stretches toward you. 5. Hold the position for at least 15 to 30 seconds. 6. Repeat 2 to 4 times a session, up to 5 sessions a day. Calf stretch    1. Stand facing a wall with your hands on the wall at about eye level. Put the leg you want to stretch about a step behind your other leg. 2. Keeping your back heel on the floor, bend your front knee until you feel a stretch in the back leg. 3. Hold the stretch for 15 to 30 seconds. Repeat 2 to 4 times. Plantar fascia and calf stretch    1. Stand on a step as shown above. Be sure to hold on to the banister. 2. Slowly let your heels down over the edge of the step as you relax your calf muscles. You should feel a gentle stretch across the bottom of your foot and up the back of your leg to your knee. 3. Hold the stretch about 15 to 30 seconds, and then tighten your calf muscle a little to bring your heel back up to the level of the step. Repeat 2 to 4 times. Towel curls    1. While sitting, place your foot on a towel on the floor and scrunch the towel toward you with your toes. 2. Then, also using your toes, push the towel away from you. Clancy pickups    1. Put marbles on the floor next to a cup.  2. Using your toes, try to lift the marbles up from the floor and put them in the cup. Follow-up care is a key part of your treatment and safety. Be sure to make and go to all appointments, and call your doctor if you are having problems. It's also a good idea to know your test results and keep a list of the medicines you take. Where can you learn more? Go to http://ora-claude.info/. Maira Rolon in the search box to learn more about \"Plantar Fasciitis: Exercises. \"  Current as of: November 29, 2017  Content Version: 11.8  © 3432-7333 [employer].  Care instructions adapted under license by [employer] (which disclaims liability or warranty for this information). If you have questions about a medical condition or this instruction, always ask your healthcare professional. [address] any warranty or liability for your use of this information.             Susana BURDICK

## 2018-11-09 NOTE — PATIENT INSTRUCTIONS
Plantar Fasciitis: Care Instructions  Your Care Instructions    Plantar fasciitis is pain and inflammation of the plantar fascia, the tissue at the bottom of your foot that connects the heel bone to the toes. The plantar fascia also supports the arch. If you strain the plantar fascia, it can develop small tears and cause heel pain when you stand or walk. Plantar fasciitis can be caused by running or other sports. It also may occur in people who are overweight or who have high arches or flat feet. You may get plantar fasciitis if you walk or stand for long periods, or have a tight Achilles tendon or calf muscles. You can improve your foot pain with rest and other care at home. It might take a few weeks to a few months for your foot to heal completely. Follow-up care is a key part of your treatment and safety. Be sure to make and go to all appointments, and call your doctor if you are having problems. It's also a good idea to know your test results and keep a list of the medicines you take. How can you care for yourself at home? · Rest your feet often. Reduce your activity to a level that lets you avoid pain. If possible, do not run or walk on hard surfaces. · Take pain medicines exactly as directed. ? If the doctor gave you a prescription medicine for pain, take it as prescribed. ? If you are not taking a prescription pain medicine, take an over-the-counter anti-inflammatory medicine for pain and swelling, such as ibuprofen (Advil, Motrin) or naproxen (Aleve). Read and follow all instructions on the label. · Use ice massage to help with pain and swelling. You can use an ice cube or an ice cup several times a day. To make an ice cup, fill a paper cup with water and freeze it. Cut off the top of the cup until a half-inch of ice shows. Hold onto the remaining paper to use the cup. Rub the ice in small circles over the area for 5 to 7 minutes.   · Contrast baths, which alternate hot and cold water, can also help reduce swelling. But because heat alone may make pain and swelling worse, end a contrast bath with a soak in cold water. · Wear a night splint if your doctor suggests it. A night splint holds your foot with the toes pointed up and the foot and ankle at a 90-degree angle. This position gives the bottom of your foot a constant, gentle stretch. · Do simple exercises such as calf stretches and towel stretches 2 to 3 times each day, especially when you first get up in the morning. These can help the plantar fascia become more flexible. They also make the muscles that support your arch stronger. Hold these stretches for 15 to 30 seconds per stretch. Repeat 2 to 4 times. ? Stand about 1 foot from a wall. Place the palms of both hands against the wall at chest level. Lean forward against the wall, keeping one leg with the knee straight and heel on the ground while bending the knee of the other leg.  ? Sit down on the floor or a mat with your feet stretched in front of you. Roll up a towel lengthwise, and loop it over the ball of your foot. Holding the towel at both ends, gently pull the towel toward you to stretch your foot. · Wear shoes with good arch support. Athletic shoes or shoes with a well-cushioned sole are good choices. · Try heel cups or shoe inserts (orthotics) to help cushion your heel. You can buy these at many shoe stores. · Put on your shoes as soon as you get out of bed. Going barefoot or wearing slippers may make your pain worse. · Reach and stay at a good weight for your height. This puts less strain on your feet. When should you call for help? Call your doctor now or seek immediate medical care if:    · You have heel pain with fever, redness, or warmth in your heel.     · You cannot put weight on the sore foot.    Watch closely for changes in your health, and be sure to contact your doctor if:    · You have numbness or tingling in your heel.     · Your heel pain lasts more than 2 weeks. Where can you learn more? Go to http://ora-claude.info/. Jon Quiroz in the search box to learn more about \"Plantar Fasciitis: Care Instructions. \"  Current as of: November 29, 2017  Content Version: 11.8  © 5218-1770 [employer]. Care instructions adapted under license by [employer] (which disclaims liability or warranty for this information). If you have questions about a medical condition or this instruction, always ask your healthcare professional. [address] any warranty or liability for your use of this information. Plantar Fasciitis: Exercises  Your Care Instructions  Here are some examples of typical rehabilitation exercises for your condition. Start each exercise slowly. Ease off the exercise if you start to have pain. Your doctor or physical therapist will tell you when you can start these exercises and which ones will work best for you. How to do the exercises  Towel stretch    1. Sit with your legs extended and knees straight. 2. Place a towel around your foot just under the toes. 3. Hold each end of the towel in each hand, with your hands above your knees. 4. Pull back with the towel so that your foot stretches toward you. 5. Hold the position for at least 15 to 30 seconds. 6. Repeat 2 to 4 times a session, up to 5 sessions a day. Calf stretch    1. Stand facing a wall with your hands on the wall at about eye level. Put the leg you want to stretch about a step behind your other leg. 2. Keeping your back heel on the floor, bend your front knee until you feel a stretch in the back leg. 3. Hold the stretch for 15 to 30 seconds. Repeat 2 to 4 times. Plantar fascia and calf stretch    1. Stand on a step as shown above. Be sure to hold on to the banister. 2. Slowly let your heels down over the edge of the step as you relax your calf muscles.  You should feel a gentle stretch across the bottom of your foot and up the back of your leg to your knee. 3. Hold the stretch about 15 to 30 seconds, and then tighten your calf muscle a little to bring your heel back up to the level of the step. Repeat 2 to 4 times. Towel curls    1. While sitting, place your foot on a towel on the floor and scrunch the towel toward you with your toes. 2. Then, also using your toes, push the towel away from you. [place] pickups    1. Put marbles on the floor next to a cup.  2. Using your toes, try to lift the marbles up from the floor and put them in the cup. Follow-up care is a key part of your treatment and safety. Be sure to make and go to all appointments, and call your doctor if you are having problems. It's also a good idea to know your test results and keep a list of the medicines you take. Where can you learn more? Go to http://ora-claude.info/. Martha Cook in the search box to learn more about \"Plantar Fasciitis: Exercises. \"  Current as of: November 29, 2017  Content Version: 11.8  © 1019-8897 [employer]. Care instructions adapted under license by [employer] (which disclaims liability or warranty for this information). If you have questions about a medical condition or this instruction, always ask your healthcare professional. [address] any warranty or liability for your use of this information.

## 2018-11-21 ENCOUNTER — TELEPHONE (OUTPATIENT)
Dept: FAMILY MEDICINE CLINIC | Age: 68
End: 2018-11-21

## 2018-11-21 NOTE — TELEPHONE ENCOUNTER
Received a 90 day refill request for cetirizine 10 mg from pt pharmacy but we have on file pt is no longer taking medication. LVM for pt to contact our office back for clarification.

## 2018-11-23 ENCOUNTER — TELEPHONE (OUTPATIENT)
Dept: FAMILY MEDICINE CLINIC | Age: 68
End: 2018-11-23

## 2018-12-11 DIAGNOSIS — I10 ESSENTIAL HYPERTENSION: ICD-10-CM

## 2018-12-12 RX ORDER — DICLOFENAC SODIUM 75 MG/1
TABLET, DELAYED RELEASE ORAL
Qty: 180 TAB | Refills: 0 | Status: SHIPPED | OUTPATIENT
Start: 2018-12-12 | End: 2019-04-23

## 2018-12-12 RX ORDER — TRIAMTERENE AND HYDROCHLOROTHIAZIDE 37.5; 25 MG/1; MG/1
CAPSULE ORAL
Qty: 90 CAP | Refills: 1 | Status: SHIPPED | OUTPATIENT
Start: 2018-12-12 | End: 2019-11-20 | Stop reason: SDUPTHER

## 2018-12-12 RX ORDER — LISINOPRIL 10 MG/1
TABLET ORAL
Qty: 90 TAB | Refills: 1 | Status: SHIPPED | OUTPATIENT
Start: 2018-12-12 | End: 2020-04-15

## 2018-12-20 RX ORDER — DICLOFENAC SODIUM 75 MG/1
TABLET, DELAYED RELEASE ORAL
Qty: 180 TAB | Refills: 0 | OUTPATIENT
Start: 2018-12-20

## 2019-04-23 ENCOUNTER — OFFICE VISIT (OUTPATIENT)
Dept: FAMILY MEDICINE CLINIC | Age: 69
End: 2019-04-23

## 2019-04-23 VITALS
RESPIRATION RATE: 16 BRPM | WEIGHT: 223 LBS | BODY MASS INDEX: 37.15 KG/M2 | TEMPERATURE: 98.4 F | SYSTOLIC BLOOD PRESSURE: 122 MMHG | HEIGHT: 65 IN | HEART RATE: 83 BPM | DIASTOLIC BLOOD PRESSURE: 73 MMHG | OXYGEN SATURATION: 97 %

## 2019-04-23 DIAGNOSIS — M79.7 FIBROMYALGIA: Primary | ICD-10-CM

## 2019-04-23 DIAGNOSIS — M19.90 ARTHRITIS: ICD-10-CM

## 2019-04-23 RX ORDER — GABAPENTIN 300 MG/1
CAPSULE ORAL
Qty: 90 CAP | Refills: 1 | Status: SHIPPED | OUTPATIENT
Start: 2019-04-23 | End: 2021-03-04

## 2019-04-23 NOTE — PATIENT INSTRUCTIONS
Fibromyalgia: Care Instructions  Your Care Instructions    Fibromyalgia is a painful condition that is not completely understood by medical experts. The cause of fibromyalgia is not known. It can make you feel tired and ache all over. It causes tender spots at specific points of the body that hurt only when you press on them. You may have trouble sleeping, as well as other symptoms. These problems can upset your work and home life. Symptoms tend to come and go, although they may never go away completely. Fibromyalgia does not harm your muscles, joints, or organs. Follow-up care is a key part of your treatment and safety. Be sure to make and go to all appointments, and call your doctor if you are having problems. It's also a good idea to know your test results and keep a list of the medicines you take. How can you care for yourself at home? · Exercise often. Walk, swim, or bike to help with pain and sleep problems and to make you feel better. · Try to get a good night's sleep. Go to bed and get up at the same time each day, whether you feel rested or not. Make sure you have a good mattress and pillow. · Reduce stress. Avoid things that cause you stress, if you can. If not, work at making them less stressful. Learn to use biofeedback, guided imagery, meditation, or other methods to relax. · Make healthy changes. Eat a balanced diet, quit smoking, and limit alcohol and caffeine. · Use a heating pad set on low or take warm baths or showers for pain. Using cold packs for up to 20 minutes at a time can also relieve pain. Put a thin cloth between the cold pack and your skin. A gentle massage might help too. · Be safe with medicines. Take your medicines exactly as prescribed. Call your doctor if you think you are having a problem with your medicine. Your doctor may talk to you about taking antidepressant medicines. These medicines may improve sleep, relieve pain, and in some cases treat depression.   · Learn about fibromyalgia. This makes coping easier. Then, take an active role in your treatment. · Think about joining a support group with others who have fibromyalgia to learn more and get support. When should you call for help? Watch closely for changes in your health, and be sure to contact your doctor if:    · You feel sad, helpless, or hopeless; lose interest in things you used to enjoy; or have other symptoms of depression.     · Your fibromyalgia symptoms get worse. Where can you learn more? Go to http://ora-claude.info/. Enter V003 in the search box to learn more about \"Fibromyalgia: Care Instructions. \"  Current as of: Nahomi 3, 2018  Content Version: 11.9  © 0510-5781 [employer]. Care instructions adapted under license by [employer] (which disclaims liability or warranty for this information). If you have questions about a medical condition or this instruction, always ask your healthcare professional. [address] any warranty or liability for your use of this information.

## 2019-04-23 NOTE — PROGRESS NOTES
Tex Hogan is a 70 y.o. female who presents with the following complaints:  Chief Complaint   Patient presents with    Generalized Body Aches       Subjective:    HPI:   C/o generalized body aches and pain in knees, feet, shoulders, back, arms. She feels none of the medications she is taking are helping. She is seeing Dr. Rollie Epley for arthritis and fibromyalgia. Currently taking a prednisone taper prescribed by Dr. Rollie Epley, feels this has not been effective as well. She has tried voltaren gel, various over the counter topicals, diclofenac, meloxicam. She takes gabapentin prn but doesn't feel it helps much. She feels she needs to see a pain management specialist.  No recent fall or other traumatic injury. Pertinent PMH/FH/SH:  Past Medical History:   Diagnosis Date    Diabetes ([facility]     Hypertension     Obesity, unspecified     Other and unspecified hyperlipidemia     Unspecified vitamin D deficiency      Past Surgical History:   Procedure Laterality Date    HX HYSTERECTOMY  25     Family History   Problem Relation Age of Onset    Cancer Other         breast     Social History     Socioeconomic History    Marital status:      Spouse name: Not on file    Number of children: Not on file    Years of education: Not on file    Highest education level: Not on file   Tobacco Use    Smoking status: Former Smoker     Last attempt to quit: 2015     Years since quittin.2    Smokeless tobacco: Never Used   Substance and Sexual Activity    Alcohol use: Yes     Comment: socially    Drug use: No    Sexual activity: Never     Advanced Directives: N      Patient Active Problem List    Diagnosis    Type 2 diabetes with nephropathy ([facility]    Severe obesity (BMI 35.0-39. 9) with comorbidity (HCC)    Fibromyalgia    Primary osteoarthritis of both knees    Obesity (BMI 30-39. 9)    Chronic back pain greater than 3 months duration    Type 2 diabetes mellitus with diabetic neuropathy ([facility]    Controlled type 2 diabetes mellitus without complication, without long-term current use of insulin ([address]    Diabetes mellitus type 2, controlled ([address]    Arthritis    Hypercholesteremia    Essential hypertension    Insomnia    Hx of diabetes mellitus       Nurse notes were reviewed and are correct  Review of Systems - negative except as listed above in the HPI    Objective:     Vitals:    04/23/19 1105   BP: 122/73   Pulse: 83   Resp: 16   Temp: 98.4 °F (36.9 °C)   TempSrc: Oral   SpO2: 97%   Weight: 223 lb (101.2 kg)   Height: 5' 5\" (1.651 m)     Physical Examination: General appearance - alert, well appearing, and in no distress, oriented to person, place, and time, overweight and well hydrated  Mental status - normal mood, behavior, speech, dress, motor activity, and thought processes  Neck - supple  Neurological - alert, oriented, normal speech, no focal findings or movement disorder noted  Musculoskeletal - generalized tenderness  Extremities - no pedal edema noted  Skin - normal coloration and turgor, no rashes    Assessment/ Plan:   Diagnoses and all orders for this visit:    1. Fibromyalgia  Recommend restarting gabapentin  Recommend f/u appt with Dr. Rollie Epley (rheumatology) to discuss response to current treatment plan prior to pain management referral  -     gabapentin (NEURONTIN) 300 mg capsule; Week 1 take 1 tab at bedtime; week 2 take 1 tab twice a day, week 3 and beyond take three times a day    2. Arthritis  Multiple joints  Encouraged increased activity, consider water exercise    -     gabapentin (NEURONTIN) 300 mg capsule; Week 1 take 1 tab at bedtime; week 2 take 1 tab twice a day, week 3 and beyond take three times a day       Follow-up and Dispositions    · Return in about 6 weeks (around 6/4/2019). I have discussed the diagnosis with the patient and the intended plan as seen in the above orders.   The patient has received an after-visit summary and questions were answered concerning future plans. The patient verbalizes understanding. Medication Side Effects and Warnings were discussed with patient: yes  Patient Labs were reviewed and or requested: yes  Patient Past Records were reviewed and or requested: yes    Patient Instructions          Fibromyalgia: Care Instructions  Your Care Instructions    Fibromyalgia is a painful condition that is not completely understood by medical experts. The cause of fibromyalgia is not known. It can make you feel tired and ache all over. It causes tender spots at specific points of the body that hurt only when you press on them. You may have trouble sleeping, as well as other symptoms. These problems can upset your work and home life. Symptoms tend to come and go, although they may never go away completely. Fibromyalgia does not harm your muscles, joints, or organs. Follow-up care is a key part of your treatment and safety. Be sure to make and go to all appointments, and call your doctor if you are having problems. It's also a good idea to know your test results and keep a list of the medicines you take. How can you care for yourself at home? · Exercise often. Walk, swim, or bike to help with pain and sleep problems and to make you feel better. · Try to get a good night's sleep. Go to bed and get up at the same time each day, whether you feel rested or not. Make sure you have a good mattress and pillow. · Reduce stress. Avoid things that cause you stress, if you can. If not, work at making them less stressful. Learn to use biofeedback, guided imagery, meditation, or other methods to relax. · Make healthy changes. Eat a balanced diet, quit smoking, and limit alcohol and caffeine. · Use a heating pad set on low or take warm baths or showers for pain. Using cold packs for up to 20 minutes at a time can also relieve pain. Put a thin cloth between the cold pack and your skin. A gentle massage might help too. · Be safe with medicines.  Take your medicines exactly as prescribed. Call your doctor if you think you are having a problem with your medicine. Your doctor may talk to you about taking antidepressant medicines. These medicines may improve sleep, relieve pain, and in some cases treat depression. · Learn about fibromyalgia. This makes coping easier. Then, take an active role in your treatment. · Think about joining a support group with others who have fibromyalgia to learn more and get support. When should you call for help? Watch closely for changes in your health, and be sure to contact your doctor if:    · You feel sad, helpless, or hopeless; lose interest in things you used to enjoy; or have other symptoms of depression.     · Your fibromyalgia symptoms get worse. Where can you learn more? Go to http://ora-claude.info/. Enter V003 in the search box to learn more about \"Fibromyalgia: Care Instructions. \"  Current as of: Nahomi 3, 2018  Content Version: 11.9  © 0650-7984 [employer]. Care instructions adapted under license by [employer] (which disclaims liability or warranty for this information). If you have questions about a medical condition or this instruction, always ask your healthcare professional. [place] [address] any warranty or liability for your use of this information.             Santosh BURDICK

## 2019-04-23 NOTE — PROGRESS NOTES
1. Have you been to the ER, urgent care clinic since your last visit? Hospitalized since your last visit? No    2. Have you seen or consulted any other health care providers outside of the [address] [place] [address] since your last visit? Include any pap smears or colon screening.  No     Chief Complaint   Patient presents with    Generalized Body Aches

## 2019-07-31 ENCOUNTER — OFFICE VISIT (OUTPATIENT)
Dept: FAMILY MEDICINE CLINIC | Age: 69
End: 2019-07-31

## 2019-07-31 VITALS
SYSTOLIC BLOOD PRESSURE: 119 MMHG | HEART RATE: 87 BPM | TEMPERATURE: 98.5 F | BODY MASS INDEX: 35.12 KG/M2 | OXYGEN SATURATION: 93 % | DIASTOLIC BLOOD PRESSURE: 73 MMHG | WEIGHT: 210.8 LBS | HEIGHT: 65 IN

## 2019-07-31 DIAGNOSIS — E78.00 HYPERCHOLESTEREMIA: ICD-10-CM

## 2019-07-31 DIAGNOSIS — Z71.89 ADVANCED DIRECTIVES, COUNSELING/DISCUSSION: ICD-10-CM

## 2019-07-31 DIAGNOSIS — Z00.00 MEDICARE ANNUAL WELLNESS VISIT, SUBSEQUENT: Primary | ICD-10-CM

## 2019-07-31 DIAGNOSIS — M79.7 FIBROMYALGIA: ICD-10-CM

## 2019-07-31 DIAGNOSIS — E11.9 CONTROLLED TYPE 2 DIABETES MELLITUS WITHOUT COMPLICATION, WITHOUT LONG-TERM CURRENT USE OF INSULIN (HCC): ICD-10-CM

## 2019-07-31 DIAGNOSIS — E78.2 MIXED HYPERLIPIDEMIA: ICD-10-CM

## 2019-07-31 DIAGNOSIS — I10 ESSENTIAL HYPERTENSION: ICD-10-CM

## 2019-07-31 NOTE — PROGRESS NOTES
This is the Subsequent Medicare Annual Wellness Exam, performed 12 months or more after the Initial AWV or the last Subsequent AWV    I have reviewed the patient's medical history in detail and updated the computerized patient record. History     Past Medical History:   Diagnosis Date    Diabetes ([address]     Hypertension     Obesity, unspecified     Other and unspecified hyperlipidemia     Unspecified vitamin D deficiency       Past Surgical History:   Procedure Laterality Date    HX HYSTERECTOMY  1983     Current Outpatient Medications   Medication Sig Dispense Refill    gabapentin (NEURONTIN) 300 mg capsule Week 1 take 1 tab at bedtime; week 2 take 1 tab twice a day, week 3 and beyond take three times a day 90 Cap 1    triamterene-hydroCHLOROthiazide (DYAZIDE) 37.5-25 mg per capsule TAKE 1 CAPSULE EVERY DAY 90 Cap 1    lisinopril (PRINIVIL, ZESTRIL) 10 mg tablet TAKE 1 TABLET EVERY DAY 90 Tab 1    metFORMIN (GLUCOPHAGE) 500 mg tablet TAKE 1 TABLET DAILY WITH MEALS 90 Tab 3    acetaminophen (TYLENOL EXTRA STRENGTH) 500 mg tablet Take  by mouth every six (6) hours as needed for Pain.  alcohol swabs (BD SINGLE USE SWABS REGULAR) padm 1 Swab by Apply Externally route daily. 100 Pad 3    lancets (TRUEPLUS LANCETS) 30 gauge misc Use to test blood sugar once daily. Dx:E11.9 1 Package 3    Blood-Glucose Meter (TRUE METRIX AIR GLUCOSE METER) misc Use to test blood sugar once daily. Dx:E11.9 1 Each 0    glucose blood VI test strips (TRUE METRIX GLUCOSE TEST STRIP) strip Use to check blood sugar once daily. Dx: E11.9 100 Strip 3    MAGNESIUM HYDROXIDE (MILK OF MAGNESIA PO)       VITAMIN D3 2,000 unit cap capsule Take 1 Cap by mouth daily.  aspirin 81 mg chewable tablet Take 81 mg by mouth daily.        Allergies   Allergen Reactions    Codeine Other (comments)     \"Didn't like the way it made me feel\"    Lipitor [Atorvastatin] Other (comments)     cramps    Pcn [Penicillins] Itching     Family History   Problem Relation Age of Onset    Cancer Other         breast     Social History     Tobacco Use    Smoking status: Former Smoker     Last attempt to quit: 2015     Years since quittin.5    Smokeless tobacco: Never Used   Substance Use Topics    Alcohol use: Yes     Comment: socially     Patient Active Problem List   Diagnosis Code    Arthritis M19.90    Hypercholesteremia E78.00    Essential hypertension I10    Insomnia G47.00    Hx of diabetes mellitus Z86.39    Diabetes mellitus type 2, controlled ([facility] E11.9    Controlled type 2 diabetes mellitus without complication, without long-term current use of insulin (HCC) E11.9    Type 2 diabetes mellitus with diabetic neuropathy (HCC) E11.40    Fibromyalgia M79.7    Primary osteoarthritis of both knees M17.0    Obesity (BMI 30-39. 9) E66.9    Chronic back pain greater than 3 months duration M54.9, G89.29    Severe obesity (BMI 35.0-39. 9) with comorbidity ([facility] E66.01    Type 2 diabetes with nephropathy ([facility] E11.21       Depression Risk Factor Screening:     3 most recent PHQ Screens 2019   Little interest or pleasure in doing things Not at all   Feeling down, depressed, irritable, or hopeless Not at all   Total Score PHQ 2 0     Alcohol Risk Factor Screening: You do not drink alcohol or very rarely. Functional Ability and Level of Safety:   Hearing Loss  Hearing is good. Activities of Daily Living  The home contains: no safety equipment. Patient does total self care    Fall Risk  Fall Risk Assessment, last 12 mths 2019   Able to walk? Yes   Fall in past 12 months?  No       Abuse Screen  Patient is not abused    Cognitive Screening   Evaluation of Cognitive Function:  Has your family/caregiver stated any concerns about your memory: no  Normal, Clock Drawing test    Patient Care Team   Patient Care Team:  Abdi Maya MD as PCP - General (Family Practice)    Assessment/Plan   Education and counseling provided:  Are appropriate based on today's review and evaluation  End-of-Life planning (with patient's consent)    Diagnoses and all orders for this visit:    1. Medicare annual wellness visit, subsequent    2. Advanced directives, counseling/discussion  -     ADVANCE CARE PLANNING FIRST 27 MINS              Chief Complaint   Patient presents with    Follow-up     she is a 78y.o. year old female who presents for evalution. She has the complaint of \" pain all over\"   She says it tis the same aomplaint- chronic  Told she has fibromyalgia   Nothing new  She needs labs today for Diabetes and htn      Reviewed PmHx, RxHx, FmHx, SocHx, AllgHx and updated and dated in the chart. Aspirin yes ____   No____ N/A____    Patient Active Problem List    Diagnosis    Type 2 diabetes with nephropathy ([address]    Severe obesity (BMI 35.0-39. 9) with comorbidity (HCC)    Fibromyalgia    Primary osteoarthritis of both knees    Obesity (BMI 30-39. 9)    Chronic back pain greater than 3 months duration    Type 2 diabetes mellitus with diabetic neuropathy (HCC)    Controlled type 2 diabetes mellitus without complication, without long-term current use of insulin ([address]    Diabetes mellitus type 2, controlled ([address]    Arthritis    Hypercholesteremia    Essential hypertension    Insomnia    Hx of diabetes mellitus       Nurse notes were reviewed and copied and are correct  Review of Systems - negative except as listed above in the HPI    Objective:     Vitals:    07/31/19 1202   BP: 119/73   Pulse: 87   Temp: 98.5 °F (36.9 °C)   SpO2: 93%   Weight: 210 lb 12.8 oz (95.6 kg)   Height: 5' 5\" (1.651 m)     Physical Examination: General appearance - alert, well appearing, and in no distress  Mental status - alert, oriented to person, place, and time  Eyes - pupils equal and reactive, extraocular eye movements intact  Neck - supple, no significant adenopathy  Chest - clear to auscultation, no wheezes, rales or rhonchi, symmetric air entry  Heart - normal rate, regular rhythm, normal S1, S2, no murmurs, rubs, clicks or gallops  Neurological - alert, oriented, normal speech, no focal findings or movement disorder noted  Musculoskeletal - no joint tenderness, deformity or swelling  Extremities - peripheral pulses normal, no pedal edema, no clubbing or cyanosis     Sensory exam of the foot is normal, tested with the monofilament. Good pulses, no lesions or ulcers, good peripheral pulses. Assessment/ Plan:   Diagnoses and all orders for this visit:    1. Essential hypertension  -     METABOLIC PANEL, COMPREHENSIVE    2. Hypercholesteremia  -     LIPID PANEL    3. Mixed hyperlipidemia  -     LIPID PANEL    4. Controlled type 2 diabetes mellitus without complication, without long-term current use of insulin (HCC)  -     MICROALBUMIN, UR, RAND W/ MICROALB/CREAT RATIO  -     HEMOGLOBIN A1C WITH EAG  -      DIABETES FOOT EXAM  -     LIPID PANEL    5. Fibromyalgia  -     REFERRAL TO PHYSICAL THERAPY         Follow-up and Dispositions    · Return in about 3 months (around 10/31/2019). ICD-10-CM ICD-9-CM    1. Essential hypertension N70 540.3 METABOLIC PANEL, COMPREHENSIVE   2. Hypercholesteremia E78.00 272.0 LIPID PANEL   3. Mixed hyperlipidemia E78.2 272.2 LIPID PANEL   4. Controlled type 2 diabetes mellitus without complication, without long-term current use of insulin (HCC) E11.9 250.00 MICROALBUMIN, UR, RAND W/ MICROALB/CREAT RATIO      HEMOGLOBIN A1C WITH EAG       DIABETES FOOT EXAM      LIPID PANEL   5. Fibromyalgia M79.7 729.1 REFERRAL TO PHYSICAL THERAPY   6. Medicare annual wellness visit, subsequent Z00.00 V70.0    7. Advanced directives, counseling/discussion Z71.89 V65.49 ADVANCE CARE PLANNING FIRST 27 MINS       I have discussed the diagnosis with the patient and the intended plan as seen in the above orders. The patient has received an after-visit summary and questions were answered concerning future plans.      Medication Side Effects and Warnings were discussed with patient: yes  Patient Labs were reviewed and or requested: yes  Patient Past Records were reviewed and or requested: yes        Patient Instructions       Medicare Wellness Visit, Female     The best way to live healthy is to have a lifestyle where you eat a well-balanced diet, exercise regularly, limit alcohol use, and quit all forms of tobacco/nicotine, if applicable. Regular preventive services are another way to keep healthy. Preventive services (vaccines, screening tests, monitoring & exams) can help personalize your care plan, which helps you manage your own care. Screening tests can find health problems at the earliest stages, when they are easiest to treat. [facility] follows the current, evidence-based guidelines published by the [facility] ([facility]) when recommending preventive services for our patients. Because we follow these guidelines, sometimes recommendations change over time as research supports it. (For example, mammograms used to be recommended annually. Even though Medicare will still pay for an annual mammogram, the newer guidelines recommend a mammogram every two years for women of average risk.)  Of course, you and your doctor may decide to screen more often for some diseases, based on your risk and your health status. Preventive services for you include:  - Medicare offers their members a free annual wellness visit, which is time for you and your primary care provider to discuss and plan for your preventive service needs. Take advantage of this benefit every year!  -All adults over the age of 72 should receive the recommended pneumonia vaccines. Current USPSTF guidelines recommend a series of two vaccines for the best pneumonia protection.   -All adults should have a flu vaccine yearly and a tetanus vaccine every 10 years.  All adults age 61 and older should receive a shingles vaccine once in their lifetime.    -A bone mass density test is recommended when a woman turns 65 to screen for osteoporosis. This test is only recommended one time, as a screening. Some providers will use this same test as a disease monitoring tool if you already have osteoporosis. -All adults age 38-68 who are overweight should have a diabetes screening test once every three years.   -Other screening tests and preventive services for persons with diabetes include: an eye exam to screen for diabetic retinopathy, a kidney function test, a foot exam, and stricter control over your cholesterol.   -Cardiovascular screening for adults with routine risk involves an electrocardiogram (ECG) at intervals determined by your doctor.   -Colorectal cancer screenings should be done for adults age 54-65 with no increased risk factors for colorectal cancer. There are a number of acceptable methods of screening for this type of cancer. Each test has its own benefits and drawbacks. Discuss with your doctor what is most appropriate for you during your annual wellness visit. The different tests include: colonoscopy (considered the best screening method), a fecal occult blood test, a fecal DNA test, and sigmoidoscopy. -Breast cancer screenings are recommended every other year for women of normal risk, age 54-69.  -Cervical cancer screenings for women over age 72 are only recommended with certain risk factors.   -All adults born between [facility] should be screened once for Hepatitis C.      Here is a list of your current Health Maintenance items (your personalized list of preventive services) with a due date:  Health Maintenance Due   Topic Date Due    Shingles Vaccine (1 of 2) 05/28/1999    Diabetic Foot Care  04/29/2017    Albumin Urine Test  04/29/2017    Hemoglobin A1C    04/24/2019             The patient verbalizes understanding and agrees with the plan of care        Patient has the advanced directives booklet to review  Health Maintenance Due   Topic Date Due  Shingrix Vaccine Age 50> (1 of 2) 05/28/1999    FOOT EXAM Q1  04/29/2017    MICROALBUMIN Q1  04/29/2017    HEMOGLOBIN A1C Q6M  04/24/2019

## 2019-07-31 NOTE — ACP (ADVANCE CARE PLANNING)
Advance Care Planning    Advance Care Planning (ACP) Provider Conversation Snapshot    Date of ACP Conversation: 07/31/19  Persons included in Conversation:  patient  Length of ACP Conversation in minutes:  16 minutes    Authorized Decision Maker (if patient is incapable of making informed decisions): This person is: Other Legally Authorized Decision Maker (e.g. Next of Kin)            For Patients with Decision Making Capacity:   Values/Goals: Exploration of values, goals, and preferences if recovery is not expected, even with continued medical treatment in the event of:  Imminent death  Severe, permanent brain injury  \"In these circumstances, what matters most to you? \"  Care focused more on comfort or quality of life.     Conversation Outcomes / Follow-Up Plan:   Reviewed existing Advance Directive

## 2019-07-31 NOTE — PATIENT INSTRUCTIONS
Medicare Wellness Visit, Female The best way to live healthy is to have a lifestyle where you eat a well-balanced diet, exercise regularly, limit alcohol use, and quit all forms of tobacco/nicotine, if applicable. Regular preventive services are another way to keep healthy. Preventive services (vaccines, screening tests, monitoring & exams) can help personalize your care plan, which helps you manage your own care. Screening tests can find health problems at the earliest stages, when they are easiest to treat. [facility] follows the current, evidence-based guidelines published by the [facility] ([facility]) when recommending preventive services for our patients. Because we follow these guidelines, sometimes recommendations change over time as research supports it. (For example, mammograms used to be recommended annually. Even though Medicare will still pay for an annual mammogram, the newer guidelines recommend a mammogram every two years for women of average risk.) Of course, you and your doctor may decide to screen more often for some diseases, based on your risk and your health status. Preventive services for you include: - Medicare offers their members a free annual wellness visit, which is time for you and your primary care provider to discuss and plan for your preventive service needs. Take advantage of this benefit every year! 
-All adults over the age of 72 should receive the recommended pneumonia vaccines. Current USPSTF guidelines recommend a series of two vaccines for the best pneumonia protection.  
-All adults should have a flu vaccine yearly and a tetanus vaccine every 10 years. All adults age 61 and older should receive a shingles vaccine once in their lifetime.   
-A bone mass density test is recommended when a woman turns 65 to screen for osteoporosis. This test is only recommended one time, as a screening. Some providers will use this same test as a disease monitoring tool if you already have osteoporosis. -All adults age 38-68 who are overweight should have a diabetes screening test once every three years.  
-Other screening tests and preventive services for persons with diabetes include: an eye exam to screen for diabetic retinopathy, a kidney function test, a foot exam, and stricter control over your cholesterol.  
-Cardiovascular screening for adults with routine risk involves an electrocardiogram (ECG) at intervals determined by your doctor.  
-Colorectal cancer screenings should be done for adults age 54-65 with no increased risk factors for colorectal cancer. There are a number of acceptable methods of screening for this type of cancer. Each test has its own benefits and drawbacks. Discuss with your doctor what is most appropriate for you during your annual wellness visit. The different tests include: colonoscopy (considered the best screening method), a fecal occult blood test, a fecal DNA test, and sigmoidoscopy. -Breast cancer screenings are recommended every other year for women of normal risk, age 54-69. 
-Cervical cancer screenings for women over age 72 are only recommended with certain risk factors.  
-All adults born between [facility] should be screened once for Hepatitis C. Here is a list of your current Health Maintenance items (your personalized list of preventive services) with a due date: 
Health Maintenance Due Topic Date Due  Shingles Vaccine (1 of 2) 05/28/1999 [facility] Diabetic Foot Care  04/29/2017  Albumin Urine Test  04/29/2017  Hemoglobin A1C    04/24/2019

## 2019-08-01 LAB
ALBUMIN SERPL-MCNC: 4.1 G/DL (ref 3.5–4.8)
ALBUMIN/CREAT UR: 2.4 MG/G CREAT (ref 0–30)
ALBUMIN/GLOB SERPL: 1.1 {RATIO} (ref 1.2–2.2)
ALP SERPL-CCNC: 95 IU/L (ref 39–117)
ALT SERPL-CCNC: 12 IU/L (ref 0–32)
AST SERPL-CCNC: 15 IU/L (ref 0–40)
BILIRUB SERPL-MCNC: 0.3 MG/DL (ref 0–1.2)
BUN SERPL-MCNC: 14 MG/DL (ref 8–27)
BUN/CREAT SERPL: 14 (ref 12–28)
CALCIUM SERPL-MCNC: 10 MG/DL (ref 8.7–10.3)
CHLORIDE SERPL-SCNC: 102 MMOL/L (ref 96–106)
CHOLEST SERPL-MCNC: 222 MG/DL (ref 100–199)
CO2 SERPL-SCNC: 27 MMOL/L (ref 20–29)
CREAT SERPL-MCNC: 1 MG/DL (ref 0.57–1)
CREAT UR-MCNC: 160.2 MG/DL
EST. AVERAGE GLUCOSE BLD GHB EST-MCNC: 143 MG/DL
GLOBULIN SER CALC-MCNC: 3.8 G/DL (ref 1.5–4.5)
GLUCOSE SERPL-MCNC: 96 MG/DL (ref 65–99)
HBA1C MFR BLD: 6.6 % (ref 4.8–5.6)
HDLC SERPL-MCNC: 45 MG/DL
INTERPRETATION, 910389: NORMAL
INTERPRETATION: NORMAL
LDLC SERPL CALC-MCNC: 158 MG/DL (ref 0–99)
Lab: NORMAL
MICROALBUMIN UR-MCNC: 3.8 UG/ML
PDF IMAGE, 910387: NORMAL
POTASSIUM SERPL-SCNC: 4.1 MMOL/L (ref 3.5–5.2)
PROT SERPL-MCNC: 7.9 G/DL (ref 6–8.5)
SODIUM SERPL-SCNC: 143 MMOL/L (ref 134–144)
TRIGL SERPL-MCNC: 97 MG/DL (ref 0–149)
VLDLC SERPL CALC-MCNC: 19 MG/DL (ref 5–40)

## 2019-08-18 NOTE — PROGRESS NOTES
The blood sugar and cholesterol are a tiny bit higher  Be careful to avoid sweets and starches. Also try to move more each day.  Recheck in 3  months

## 2019-09-19 DIAGNOSIS — E11.9 CONTROLLED TYPE 2 DIABETES MELLITUS WITHOUT COMPLICATION, WITHOUT LONG-TERM CURRENT USE OF INSULIN (HCC): ICD-10-CM

## 2019-09-20 RX ORDER — METFORMIN HYDROCHLORIDE 500 MG/1
TABLET ORAL
Qty: 90 TAB | Refills: 3 | Status: SHIPPED | OUTPATIENT
Start: 2019-09-20 | End: 2021-03-04

## 2019-09-25 DIAGNOSIS — M17.0 PRIMARY OSTEOARTHRITIS OF BOTH KNEES: ICD-10-CM

## 2019-09-25 DIAGNOSIS — M54.9 BACK PAIN, UNSPECIFIED BACK LOCATION, UNSPECIFIED BACK PAIN LATERALITY, UNSPECIFIED CHRONICITY: Primary | ICD-10-CM

## 2019-09-25 DIAGNOSIS — M79.7 FIBROMYALGIA: ICD-10-CM

## 2019-10-03 ENCOUNTER — HOSPITAL ENCOUNTER (OUTPATIENT)
Dept: GENERAL RADIOLOGY | Age: 69
Discharge: HOME | End: 2019-10-03
Attending: PHYSICAL MEDICINE & REHABILITATION
Payer: MEDICARE

## 2019-10-03 DIAGNOSIS — R52 PAIN: ICD-10-CM

## 2019-10-03 PROCEDURE — 72110 X-RAY EXAM L-2 SPINE 4/>VWS: CPT

## 2019-10-03 PROCEDURE — 73502 X-RAY EXAM HIP UNI 2-3 VIEWS: CPT

## 2019-11-12 ENCOUNTER — OFFICE VISIT (OUTPATIENT)
Dept: FAMILY MEDICINE CLINIC | Age: 69
End: 2019-11-12

## 2019-11-12 VITALS
DIASTOLIC BLOOD PRESSURE: 76 MMHG | SYSTOLIC BLOOD PRESSURE: 127 MMHG | HEART RATE: 92 BPM | HEIGHT: 65 IN | TEMPERATURE: 98.6 F | RESPIRATION RATE: 17 BRPM | WEIGHT: 211 LBS | BODY MASS INDEX: 35.16 KG/M2 | OXYGEN SATURATION: 97 %

## 2019-11-12 DIAGNOSIS — M79.671 RIGHT FOOT PAIN: Primary | ICD-10-CM

## 2019-11-12 DIAGNOSIS — Z23 ENCOUNTER FOR IMMUNIZATION: ICD-10-CM

## 2019-11-12 RX ORDER — DICLOFENAC SODIUM 10 MG/G
2 GEL TOPICAL 4 TIMES DAILY
Qty: 1 EACH | Refills: 2 | Status: SHIPPED | OUTPATIENT
Start: 2019-11-12 | End: 2021-03-19

## 2019-11-12 NOTE — PROGRESS NOTES
1. Have you been to the ER, urgent care clinic since your last visit? Hospitalized since your last visit? No    2. Have you seen or consulted any other health care providers outside of the [address] [place] [address] since your last visit? Include any pap smears or colon screening.  No       Chief Complaint   Patient presents with    Foot Swelling     right foot    Foot Pain

## 2019-11-12 NOTE — PROGRESS NOTES
Chief Complaint   Patient presents with    Foot Swelling     right foot    Foot Pain     she is a 78y.o. year old female who presents for evalution. She has pain in rhe right foot for 4 days with weight  Bearing  She had no fall and no other trauma to the foot    Reviewed PmHx, RxHx, FmHx, SocHx, AllgHx and updated and dated in the chart. Aspirin yes ____   No____ N/A____    Patient Active Problem List    Diagnosis    Type 2 diabetes with nephropathy ([address]    Severe obesity (BMI 35.0-39. 9) with comorbidity (HCC)    Fibromyalgia    Primary osteoarthritis of both knees    Obesity (BMI 30-39. 9)    Chronic back pain greater than 3 months duration    Type 2 diabetes mellitus with diabetic neuropathy ([address]    Controlled type 2 diabetes mellitus without complication, without long-term current use of insulin ([facility]    Diabetes mellitus type 2, controlled ([facility]    Arthritis    Hypercholesteremia    Essential hypertension    Insomnia    Hx of diabetes mellitus       Nurse notes were reviewed and copied and are correct  Review of Systems - negative except as listed above in the HPI    Objective:     Vitals:    11/12/19 1445   BP: 127/76   Pulse: 92   Resp: 17   Temp: 98.6 °F (37 °C)   TempSrc: Oral   SpO2: 97%   Weight: 211 lb (95.7 kg)   Height: 5' 5\" (1.651 m)        Physical Examination: General appearance - alert, well appearing, and in no distress  Mental status - alert, oriented to person, place, and time  Musculoskeletal - tender on dorsum, slightly swollen, no redness      Assessment/ Plan:   Diagnoses and all orders for this visit:    1. Right foot pain  -     diclofenac (VOLTAREN) 1 % gel; Apply 2 g to affected area four (4) times daily. 2. Encounter for immunization  -     INFLUENZA VIRUS VAC QUAD,SPLIT,PRESV FREE SYRINGE IM       Follow-up and Dispositions    · Return in about 3 months (around 2/12/2020). ICD-10-CM ICD-9-CM    1.  Right foot pain M79.671 729.5 diclofenac (VOLTAREN) 1 % gel   2. Encounter for immunization Z23 V03.89 INFLUENZA VIRUS VAC QUAD,SPLIT,PRESV FREE SYRINGE IM       I have discussed the diagnosis with the patient and the intended plan as seen in the above orders. The patient has received an after-visit summary and questions were answered concerning future plans. Medication Side Effects and Warnings were discussed with patient: yes  Patient Labs were reviewed and or requested: yes  Patient Past Records were reviewed and or requested: yes        There are no Patient Instructions on file for this visit.     The patient verbalizes understanding and agrees with the plan of care        Patient has the advanced directives booklet to review

## 2019-11-15 ENCOUNTER — HOSPITAL ENCOUNTER (OUTPATIENT)
Dept: MRI IMAGING | Age: 69
Discharge: HOME | End: 2019-11-15
Attending: PHYSICAL MEDICINE & REHABILITATION
Payer: MEDICARE

## 2019-11-15 DIAGNOSIS — M51.26 DISPLACEMENT OF LUMBAR INTERVERTEBRAL DISC WITHOUT MYELOPATHY: ICD-10-CM

## 2019-11-15 PROCEDURE — 72148 MRI LUMBAR SPINE W/O DYE: CPT

## 2019-11-20 DIAGNOSIS — I10 ESSENTIAL HYPERTENSION: ICD-10-CM

## 2019-11-22 RX ORDER — TRIAMTERENE AND HYDROCHLOROTHIAZIDE 37.5; 25 MG/1; MG/1
CAPSULE ORAL
Qty: 90 CAP | Refills: 0 | Status: SHIPPED | OUTPATIENT
Start: 2019-11-22 | End: 2021-02-18

## 2020-02-05 ENCOUNTER — OFFICE VISIT (OUTPATIENT)
Dept: FAMILY MEDICINE CLINIC | Age: 70
End: 2020-02-05

## 2020-02-05 VITALS
HEIGHT: 65 IN | HEART RATE: 74 BPM | RESPIRATION RATE: 16 BRPM | SYSTOLIC BLOOD PRESSURE: 145 MMHG | WEIGHT: 218.8 LBS | OXYGEN SATURATION: 95 % | TEMPERATURE: 98.5 F | BODY MASS INDEX: 36.46 KG/M2 | DIASTOLIC BLOOD PRESSURE: 84 MMHG

## 2020-02-05 DIAGNOSIS — R13.10 DYSPHAGIA, UNSPECIFIED TYPE: ICD-10-CM

## 2020-02-05 DIAGNOSIS — J02.0 STREP PHARYNGITIS: Primary | ICD-10-CM

## 2020-02-05 LAB
S PYO AG THROAT QL: POSITIVE
VALID INTERNAL CONTROL?: YES

## 2020-02-05 RX ORDER — CEFDINIR 300 MG/1
300 CAPSULE ORAL 2 TIMES DAILY
Qty: 20 CAP | Refills: 0 | Status: SHIPPED | OUTPATIENT
Start: 2020-02-05 | End: 2020-02-15

## 2020-02-05 NOTE — PROGRESS NOTES
Shala Maravilla is a 78 y.o. female      Chief Complaint   Patient presents with    Other     Pt stated her throat feels \"funny\". pt stated that it's not sore. X 2-3 weeks. 1. Have you been to the ER, urgent care clinic since your last visit? Hospitalized since your last visit? No    2. Have you seen or consulted any other health care providers outside of the [address] [place] [address] since your last visit? Include any pap smears or colon screening.  No

## 2020-02-05 NOTE — PATIENT INSTRUCTIONS
Strep Throat: Care Instructions  Your Care Instructions    Strep throat is a bacterial infection that causes sudden, severe sore throat and fever. Strep throat, which is caused by bacteria called streptococcus, is treated with antibiotics. Sometimes a strep test is necessary to tell if the sore throat is caused by strep bacteria. Treatment can help ease symptoms and may prevent future problems. Follow-up care is a key part of your treatment and safety. Be sure to make and go to all appointments, and call your doctor if you are having problems. It's also a good idea to know your test results and keep a list of the medicines you take. How can you care for yourself at home? · Take your antibiotics as directed. Do not stop taking them just because you feel better. You need to take the full course of antibiotics. · Strep throat can spread to others until 24 hours after you begin taking antibiotics. During this time, you should avoid contact with other people at work or home, especially infants and children. Do not sneeze or cough on others, and wash your hands often. Keep your drinking glass and eating utensils separate from those of others, and wash these items well in hot, soapy water. · Gargle with warm salt water at least once each hour to help reduce swelling and make your throat feel better. Use 1 teaspoon of salt mixed in 8 fluid ounces of warm water. · Take an over-the-counter pain medication, such as acetaminophen (Tylenol), ibuprofen (Advil, Motrin), or naproxen (Aleve). Read and follow all instructions on the label. · Try an over-the-counter anesthetic throat spray or throat lozenges, which may help relieve throat pain. · Drink plenty of fluids. Fluids may help soothe an irritated throat. Hot fluids, such as tea or soup, may help your throat feel better. · Eat soft solids and drink plenty of clear liquids.  Flavored ice pops, ice cream, scrambled eggs, sherbet, and gelatin dessert (such as Jell-O) may also soothe the throat. · Get lots of rest.  · Do not smoke, and avoid secondhand smoke. If you need help quitting, talk to your doctor about stop-smoking programs and medicines. These can increase your chances of quitting for good. · Use a vaporizer or humidifier to add moisture to the air in your bedroom. Follow the directions for cleaning the machine. When should you call for help? Call your doctor now or seek immediate medical care if:    · You have a new or higher fever.     · You have a fever with a stiff neck or severe headache.     · You have new or worse trouble swallowing.     · Your sore throat gets much worse on one side.     · Your pain becomes much worse on one side of your throat.    Watch closely for changes in your health, and be sure to contact your doctor if:    · You are not getting better after 2 days (48 hours).     · You do not get better as expected. Where can you learn more? Go to http://ora-claude.info/. Enter K625 in the search box to learn more about \"Strep Throat: Care Instructions. \"  Current as of: October 21, 2018  Content Version: 12.2  © 5229-8398 [employer]. Care instructions adapted under license by [employer] (which disclaims liability or warranty for this information). If you have questions about a medical condition or this instruction, always ask your healthcare professional. [address] any warranty or liability for your use of this information. Gastroesophageal Reflux Disease (GERD): Care Instructions  Your Care Instructions    Gastroesophageal reflux disease (GERD) is the backward flow of stomach acid into the esophagus. The esophagus is the tube that leads from your throat to your stomach. A one-way valve prevents the stomach acid from moving up into this tube. When you have GERD, this valve does not close tightly enough.   If you have mild GERD symptoms including heartburn, you may be able to control the problem with antacids or over-the-counter medicine. Changing your diet, losing weight, and making other lifestyle changes can also help reduce symptoms. Follow-up care is a key part of your treatment and safety. Be sure to make and go to all appointments, and call your doctor if you are having problems. It's also a good idea to know your test results and keep a list of the medicines you take. How can you care for yourself at home? · Take your medicines exactly as prescribed. Call your doctor if you think you are having a problem with your medicine. · Your doctor may recommend over-the-counter medicine. For mild or occasional indigestion, antacids, such as Tums, Gaviscon, Mylanta, or Maalox, may help. Your doctor also may recommend over-the-counter acid reducers, such as Pepcid AC, Tagamet HB, Zantac 75, or Prilosec. Read and follow all instructions on the label. If you use these medicines often, talk with your doctor. · Change your eating habits. ? It's best to eat several small meals instead of two or three large meals. ? After you eat, wait 2 to 3 hours before you lie down. ? Chocolate, mint, and alcohol can make GERD worse. ? Spicy foods, foods that have a lot of acid (like tomatoes and oranges), and coffee can make GERD symptoms worse in some people. If your symptoms are worse after you eat a certain food, you may want to stop eating that food to see if your symptoms get better. · Do not smoke or chew tobacco. Smoking can make GERD worse. If you need help quitting, talk to your doctor about stop-smoking programs and medicines. These can increase your chances of quitting for good. · If you have GERD symptoms at night, raise the head of your bed 6 to 8 inches by putting the frame on blocks or placing a foam wedge under the head of your mattress. (Adding extra pillows does not work.)  · Do not wear tight clothing around your middle. · Lose weight if you need to.  Losing just 5 to 10 pounds can help. When should you call for help? Call your doctor now or seek immediate medical care if:    · You have new or different belly pain.     · Your stools are black and tarlike or have streaks of blood.    Watch closely for changes in your health, and be sure to contact your doctor if:    · Your symptoms have not improved after 2 days.     · Food seems to catch in your throat or chest.   Where can you learn more? Go to http://ora-claude.info/. Enter C121 in the search box to learn more about \"Gastroesophageal Reflux Disease (GERD): Care Instructions. \"  Current as of: November 7, 2018  Content Version: 12.2  © 5132-7694 [employer]. Care instructions adapted under license by [employer] (which disclaims liability or warranty for this information). If you have questions about a medical condition or this instruction, always ask your healthcare professional. [address] any warranty or liability for your use of this information.

## 2020-02-11 NOTE — PROGRESS NOTES
Subjective: Lupe Martinez is a 78 y.o. female who complains of swollen glands and discomfort while swallowing, \"funny feeling\" in throat for 2-3 weeks. She denies a history of chills, fevers, nausea, shortness of breath, vomiting, wheezing, cough and sputum production. Patient does not smoke cigarettes. Past Medical History:   Diagnosis Date    Diabetes ([address]     Hypertension     Obesity, unspecified     Other and unspecified hyperlipidemia     Unspecified vitamin D deficiency      Past Surgical History:   Procedure Laterality Date    HX HYSTERECTOMY  25     Family History   Problem Relation Age of Onset    Cancer Other         breast     Social History     Tobacco Use    Smoking status: Former Smoker     Last attempt to quit: 2015     Years since quittin.0    Smokeless tobacco: Never Used   Substance Use Topics    Alcohol use: Yes     Comment: socially       Relevant PMH: No pertinent additional PMH. Objective:      Visit Vitals  /84 (BP 1 Location: Right arm, BP Patient Position: Sitting)   Pulse 74   Temp 98.5 °F (36.9 °C) (Oral)   Resp 16   Ht 5' 5\" (1.651 m)   Wt 218 lb 12.8 oz (99.2 kg)   SpO2 95%   BMI 36.41 kg/m²      Appears alert, well appearing, and in no distress, oriented to person, place, and time, overweight and well hydrated. Ears: bilateral TM's and external ear canals normal  Oropharynx: erythematous  Neck: bilateral symmetric anterior adenopathy  Lungs: clear to auscultation, no wheezes, rales or rhonchi, symmetric air entry  The abdomen is soft without tenderness or hepatosplenomegaly. Rapid Strep test is positive    Assessment/Plan:   strep pharyngitis  Per orders. Gargle, use acetaminophen or other OTC analgesic, and take Rx fully as prescribed. Call if other family members develop similar symptoms. See prn. Diagnoses and all orders for this visit:    1.  Strep pharyngitis  Allergy to pcn, tolerated cephalosporins in the past  Add rx  Tylenol prn for discomfort  -     cefdinir (OMNICEF) 300 mg capsule; Take 1 Cap by mouth two (2) times a day for 10 days. 2. Dysphagia, unspecified type  Consider referral to GI if does not improve with tx of strep  -     AMB POC RAPID STREP A      Follow-up and Dispositions    · Return in about 2 weeks (around 2/19/2020), or if symptoms worsen or fail to improve, for f/u dysphagia. I have discussed the diagnosis with the patient and the intended plan as seen in the above orders. The patient has received an after-visit summary and questions were answered concerning future plans. Patient conveyed understanding of the plan at the time of the visit.     Jeimy Gonzalez NP  02/11/20

## 2020-04-13 DIAGNOSIS — I10 ESSENTIAL HYPERTENSION: ICD-10-CM

## 2020-04-15 RX ORDER — LISINOPRIL 10 MG/1
TABLET ORAL
Qty: 90 TAB | Refills: 1 | Status: SHIPPED | OUTPATIENT
Start: 2020-04-15 | End: 2021-03-04

## 2020-06-23 ENCOUNTER — HOSPITAL ENCOUNTER (OUTPATIENT)
Dept: GENERAL RADIOLOGY | Age: 70
Discharge: HOME | End: 2020-06-23
Attending: NURSE PRACTITIONER
Payer: MEDICARE

## 2020-06-23 DIAGNOSIS — M17.11 OSTEOARTHRITIS OF RIGHT KNEE: ICD-10-CM

## 2020-06-23 DIAGNOSIS — M17.12 OSTEOARTHRITIS OF LEFT KNEE: ICD-10-CM

## 2020-06-23 PROCEDURE — 73562 X-RAY EXAM OF KNEE 3: CPT

## 2020-09-09 ENCOUNTER — OFFICE VISIT (OUTPATIENT)
Dept: FAMILY MEDICINE CLINIC | Age: 70
End: 2020-09-09
Payer: MEDICARE

## 2020-09-09 VITALS — BODY MASS INDEX: 34.95 KG/M2 | WEIGHT: 210 LBS

## 2020-09-09 DIAGNOSIS — G47.09 OTHER INSOMNIA: ICD-10-CM

## 2020-09-09 DIAGNOSIS — E11.9 CONTROLLED TYPE 2 DIABETES MELLITUS WITHOUT COMPLICATION, WITHOUT LONG-TERM CURRENT USE OF INSULIN (HCC): ICD-10-CM

## 2020-09-09 DIAGNOSIS — Z00.00 MEDICARE ANNUAL WELLNESS VISIT, SUBSEQUENT: Primary | ICD-10-CM

## 2020-09-09 DIAGNOSIS — I10 ESSENTIAL HYPERTENSION: ICD-10-CM

## 2020-09-09 DIAGNOSIS — E78.2 MIXED HYPERLIPIDEMIA: ICD-10-CM

## 2020-09-09 DIAGNOSIS — Z71.89 ADVANCED DIRECTIVES, COUNSELING/DISCUSSION: ICD-10-CM

## 2020-09-09 PROCEDURE — G8756 NO BP MEASURE DOC: HCPCS | Performed by: FAMILY MEDICINE

## 2020-09-09 PROCEDURE — G8399 PT W/DXA RESULTS DOCUMENT: HCPCS | Performed by: FAMILY MEDICINE

## 2020-09-09 PROCEDURE — G8536 NO DOC ELDER MAL SCRN: HCPCS | Performed by: FAMILY MEDICINE

## 2020-09-09 PROCEDURE — G8427 DOCREV CUR MEDS BY ELIG CLIN: HCPCS | Performed by: FAMILY MEDICINE

## 2020-09-09 PROCEDURE — G8510 SCR DEP NEG, NO PLAN REQD: HCPCS | Performed by: FAMILY MEDICINE

## 2020-09-09 PROCEDURE — 2022F DILAT RTA XM EVC RTNOPTHY: CPT | Performed by: FAMILY MEDICINE

## 2020-09-09 PROCEDURE — G9899 SCRN MAM PERF RSLTS DOC: HCPCS | Performed by: FAMILY MEDICINE

## 2020-09-09 PROCEDURE — G0439 PPPS, SUBSEQ VISIT: HCPCS | Performed by: FAMILY MEDICINE

## 2020-09-09 PROCEDURE — 99497 ADVNCD CARE PLAN 30 MIN: CPT | Performed by: FAMILY MEDICINE

## 2020-09-09 PROCEDURE — 1090F PRES/ABSN URINE INCON ASSESS: CPT | Performed by: FAMILY MEDICINE

## 2020-09-09 PROCEDURE — 1101F PT FALLS ASSESS-DOCD LE1/YR: CPT | Performed by: FAMILY MEDICINE

## 2020-09-09 PROCEDURE — G8419 CALC BMI OUT NRM PARAM NOF/U: HCPCS | Performed by: FAMILY MEDICINE

## 2020-09-09 PROCEDURE — 3046F HEMOGLOBIN A1C LEVEL >9.0%: CPT | Performed by: FAMILY MEDICINE

## 2020-09-09 PROCEDURE — 3017F COLORECTAL CA SCREEN DOC REV: CPT | Performed by: FAMILY MEDICINE

## 2020-09-09 PROCEDURE — 99213 OFFICE O/P EST LOW 20 MIN: CPT | Performed by: FAMILY MEDICINE

## 2020-09-09 RX ORDER — HYDROXYZINE HYDROCHLORIDE 10 MG/1
10 TABLET, FILM COATED ORAL 2 TIMES DAILY
Qty: 60 TAB | Refills: 0 | Status: SHIPPED | OUTPATIENT
Start: 2020-09-09 | End: 2020-10-02 | Stop reason: SDUPTHER

## 2020-09-09 NOTE — PROGRESS NOTES
This is the Subsequent Medicare Annual Wellness Exam, performed 12 months or more after the Initial AWV or the last Subsequent AWV    I have reviewed the patient's medical history in detail and updated the computerized patient record. History     Patient Active Problem List   Diagnosis Code    Arthritis M19.90    Hypercholesteremia E78.00    Essential hypertension I10    Insomnia G47.00    Hx of diabetes mellitus Z86.39    Diabetes mellitus type 2, controlled ([facility] E11.9    Controlled type 2 diabetes mellitus without complication, without long-term current use of insulin (HCC) E11.9    Type 2 diabetes mellitus with diabetic neuropathy (HCC) E11.40    Fibromyalgia M79.7    Primary osteoarthritis of both knees M17.0    Obesity (BMI 30-39. 9) E66.9    Chronic back pain greater than 3 months duration M54.9, G89.29    Severe obesity (BMI 35.0-39. 9) with comorbidity ([facility] E66.01    Type 2 diabetes with nephropathy ([facility] E11.21     Past Medical History:   Diagnosis Date    Diabetes ([address]     Hypertension     Obesity, unspecified     Other and unspecified hyperlipidemia     Unspecified vitamin D deficiency       Past Surgical History:   Procedure Laterality Date    HX HIP REPLACEMENT  05/04/2020    HX HYSTERECTOMY  1983     Current Outpatient Medications   Medication Sig Dispense Refill    hydrOXYzine HCL (ATARAX) 10 mg tablet Take 1 Tab by mouth two (2) times a day for 30 days. 60 Tab 0    lisinopriL (PRINIVIL, ZESTRIL) 10 mg tablet TAKE 1 TABLET EVERY DAY 90 Tab 1    triamterene-hydroCHLOROthiazide (DYAZIDE) 37.5-25 mg per capsule TAKE 1 CAPSULE EVERY DAY 90 Cap 0    metFORMIN (GLUCOPHAGE) 500 mg tablet TAKE 1 TABLET EVERY DAY WITH A MEAL 90 Tab 3    acetaminophen (TYLENOL EXTRA STRENGTH) 500 mg tablet Take  by mouth every six (6) hours as needed for Pain.  alcohol swabs (BD SINGLE USE SWABS REGULAR) padm 1 Swab by Apply Externally route daily.  100 Pad 3    lancets (TRUEPLUS LANCETS) 30 gauge misc Use to test blood sugar once daily. Dx:E11.9 1 Package 3    Blood-Glucose Meter (TRUE METRIX AIR GLUCOSE METER) misc Use to test blood sugar once daily. Dx:E11.9 1 Each 0    glucose blood VI test strips (TRUE METRIX GLUCOSE TEST STRIP) strip Use to check blood sugar once daily. Dx: E11.9 100 Strip 3    MAGNESIUM HYDROXIDE (MILK OF MAGNESIA PO)       VITAMIN D3 2,000 unit cap capsule Take 1 Cap by mouth daily.  diclofenac (VOLTAREN) 1 % gel Apply 2 g to affected area four (4) times daily. 1 Each 2    gabapentin (NEURONTIN) 300 mg capsule Week 1 take 1 tab at bedtime; week 2 take 1 tab twice a day, week 3 and beyond take three times a day 90 Cap 1    aspirin 81 mg chewable tablet Take 81 mg by mouth daily. Allergies   Allergen Reactions    Codeine Other (comments)     \"Didn't like the way it made me feel\"    Lipitor [Atorvastatin] Other (comments)     cramps    Pcn [Penicillins] Itching    Iodine Unknown (comments)     Pt unknown of reaction stated it was years ago       Family History   Problem Relation Age of Onset    Cancer Other         breast     Social History     Tobacco Use    Smoking status: Former Smoker     Last attempt to quit: 2015     Years since quittin.6    Smokeless tobacco: Never Used   Substance Use Topics    Alcohol use: Yes     Comment: socially       Depression Risk Factor Screening:     3 most recent PHQ Screens 2020   Little interest or pleasure in doing things Not at all   Feeling down, depressed, irritable, or hopeless Not at all   Total Score PHQ 2 0       Alcohol Risk Screen     Alcohol Risk Factor Screening:   Do you average 1 drink per night or more than 7 drinks a week:  No    On any one occasion in the past three months have you have had more than 3 drinks containing alcohol:  No        Functional Ability and Level of Safety:   Hearing: Hearing is good. Activities of Daily Living: The home contains: no safety equipment.   Patient does total self care     Ambulation: with no difficulty     Fall Risk:  Fall Risk Assessment, last 12 mths 2/5/2020   Able to walk? Yes   Fall in past 12 months? No     Abuse Screen:  Patient is not abused       Cognitive Screening   Has your family/caregiver stated any concerns about your memory: no     Cognitive Screening: Normal - Clock Drawing Test    Patient Care Team   Patient Care Team:  Zackary Rosas MD as PCP - General (Family Medicine)  Zackary Rosas MD as PCP - [facility] Provider    Assessment/Plan   Education and counseling provided:  Are appropriate based on today's review and evaluation  End-of-Life planning (with patient's consent)    Diagnoses and all orders for this visit:    1. Medicare annual wellness visit, subsequent    2.  Advanced directives, counseling/discussion  -     ADVANCE CARE PLANNING FIRST 30 MINS              Health Maintenance Due   Topic Date Due    Pneumococcal 65+ years (1 of 1 - PPSV23) 05/28/2014    Shingrix Vaccine Age 50> (2 of 2) 12/19/2018    Foot Exam Q1  07/31/2020    A1C test (Diabetic or Prediabetic)  07/31/2020    MICROALBUMIN Q1  07/31/2020    Lipid Screen  07/31/2020    Flu Vaccine (1) 09/01/2020

## 2020-09-09 NOTE — PROGRESS NOTES
Identified pt with two pt identifiers(name and ). Reviewed record in preparation for visit and have obtained necessary documentation. Chief Complaint   Patient presents with    Medication Evaluation     f/u; pt requesting Rx for sleep     Labs     Requesting labs     Back Pain     pt requesting a specialist for back, leg, and knees         Health Maintenance Due   Topic    Pneumococcal 65+ years (1 of 1 - PPSV23)    Shingrix Vaccine Age 50> (2 of 2)    Medicare Yearly Exam     Foot Exam Q1     A1C test (Diabetic or Prediabetic)     MICROALBUMIN Q1     Lipid Screen     Flu Vaccine (1)       Coordination of Care Questionnaire:  :   1) Have you been to an emergency room, urgent care, or hospitalized since your last visit? If yes, where when, and reason for visit? no      2. Have seen or consulted any other health care provider since your last visit? If yes, where when, and reason for visit?  no        Patient is accompanied by self I have received verbal consent from Diya Sher to discuss any/all medical information while they are present in the room.

## 2020-09-09 NOTE — PROGRESS NOTES
Marne Snellen is a 69 y.o. female who was seen by synchronous (real-time) audio-video technology on 9/9/2020 for Medication Evaluation (f/u; pt requesting Rx for sleep ); Labs (Requesting labs ); and Back Pain (pt requesting a specialist for back, leg, and knees )    Here for Medicare wellness but needs separate encounter to address other pressing needs  needs refills and labs   no new complaints  Has not had labs in more than a year  She has started going to a \" back doctor\"  She says gabapentin does not help her pain and does not help her sleep        Assessment & Plan:   Diagnoses and all orders for this visit:        1. Essential hypertension  -     METABOLIC PANEL, COMPREHENSIVE; Future    2. Controlled type 2 diabetes mellitus without complication, without long-term current use of insulin (HCC)  -     METABOLIC PANEL, COMPREHENSIVE; Future  -     HEMOGLOBIN A1C WITH EAG; Future  -     LIPID PANEL; Future  -     MICROALBUMIN, UR, RAND W/ MICROALB/CREAT RATIO; Future    3. Mixed hyperlipidemia  -     LIPID PANEL; Future    4. Other insomnia  -     hydrOXYzine HCL (ATARAX) 10 mg tablet; Take 1 Tab by mouth two (2) times a day for 30 days. Subjective:       Prior to Admission medications    Medication Sig Start Date End Date Taking? Authorizing Provider   hydrOXYzine HCL (ATARAX) 10 mg tablet Take 1 Tab by mouth two (2) times a day for 30 days. 9/9/20 10/9/20 Yes Kya Elliott MD   lisinopriL (PRINIVIL, ZESTRIL) 10 mg tablet TAKE 1 TABLET EVERY DAY 4/15/20  Yes Kya Elliott MD   triamterene-hydroCHLOROthiazide (DYAZIDE) 37.5-25 mg per capsule TAKE 1 CAPSULE EVERY DAY 11/22/19  Yes Kya Elliott MD   metFORMIN (GLUCOPHAGE) 500 mg tablet TAKE 1 TABLET EVERY DAY WITH A MEAL 9/20/19  Yes Kya Elliott MD   acetaminophen (TYLENOL EXTRA STRENGTH) 500 mg tablet Take  by mouth every six (6) hours as needed for Pain.    Yes Provider, Historical   alcohol swabs (BD SINGLE USE SWABS REGULAR) padm 1 Swab by Apply Externally route daily. 1/4/18  Yes Lili Hennessy MD   lancets (TRUEPLUS LANCETS) 30 gauge misc Use to test blood sugar once daily. Dx:E11.9 1/4/18  Yes Lili Hennessy MD   Blood-Glucose Meter (TRUE METRIX AIR GLUCOSE METER) misc Use to test blood sugar once daily. Dx:E11.9 1/4/18  Yes Lili Hennessy MD   glucose blood VI test strips (TRUE METRIX GLUCOSE TEST STRIP) strip Use to check blood sugar once daily. Dx: E11.9 1/4/18  Yes Lili Hennessy MD   MAGNESIUM HYDROXIDE (MILK OF MAGNESIA PO)  4/27/15  Yes Provider, Historical   VITAMIN D3 2,000 unit cap capsule Take 1 Cap by mouth daily. 4/27/15  Yes Provider, Historical   diclofenac (VOLTAREN) 1 % gel Apply 2 g to affected area four (4) times daily. 11/12/19   Lili Hennessy MD   gabapentin (NEURONTIN) 300 mg capsule Week 1 take 1 tab at bedtime; week 2 take 1 tab twice a day, week 3 and beyond take three times a day 4/23/19   Rafa Gonzalez NP   aspirin 81 mg chewable tablet Take 81 mg by mouth daily. Provider, Historical     Patient Active Problem List    Diagnosis Date Noted    Type 2 diabetes with nephropathy ([address] 10/31/2018    Severe obesity (BMI 35.0-39. 9) with comorbidity ([address] 05/08/2018    Fibromyalgia 03/13/2018    Primary osteoarthritis of both knees 03/13/2018    Obesity (BMI 30-39.9) 03/13/2018    Chronic back pain greater than 3 months duration 03/13/2018    Type 2 diabetes mellitus with diabetic neuropathy ([address] 01/16/2018    Controlled type 2 diabetes mellitus without complication, without long-term current use of insulin ([address] 11/14/2016    Diabetes mellitus type 2, controlled ([address] 03/30/2016    Arthritis 05/27/2015    Hypercholesteremia 05/27/2015    Essential hypertension 05/27/2015    Insomnia 05/27/2015    Hx of diabetes mellitus 05/27/2015     Current Outpatient Medications   Medication Sig Dispense Refill    hydrOXYzine HCL (ATARAX) 10 mg tablet Take 1 Tab by mouth two (2) times a day for 30 days.  60 Tab 0    lisinopriL (PRINIVIL, ZESTRIL) 10 mg tablet TAKE 1 TABLET EVERY DAY 90 Tab 1    triamterene-hydroCHLOROthiazide (DYAZIDE) 37.5-25 mg per capsule TAKE 1 CAPSULE EVERY DAY 90 Cap 0    metFORMIN (GLUCOPHAGE) 500 mg tablet TAKE 1 TABLET EVERY DAY WITH A MEAL 90 Tab 3    acetaminophen (TYLENOL EXTRA STRENGTH) 500 mg tablet Take  by mouth every six (6) hours as needed for Pain.  alcohol swabs (BD SINGLE USE SWABS REGULAR) padm 1 Swab by Apply Externally route daily. 100 Pad 3    lancets (TRUEPLUS LANCETS) 30 gauge misc Use to test blood sugar once daily. Dx:E11.9 1 Package 3    Blood-Glucose Meter (TRUE METRIX AIR GLUCOSE METER) misc Use to test blood sugar once daily. Dx:E11.9 1 Each 0    glucose blood VI test strips (TRUE METRIX GLUCOSE TEST STRIP) strip Use to check blood sugar once daily. Dx: E11.9 100 Strip 3    MAGNESIUM HYDROXIDE (MILK OF MAGNESIA PO)       VITAMIN D3 2,000 unit cap capsule Take 1 Cap by mouth daily.  diclofenac (VOLTAREN) 1 % gel Apply 2 g to affected area four (4) times daily. 1 Each 2    gabapentin (NEURONTIN) 300 mg capsule Week 1 take 1 tab at bedtime; week 2 take 1 tab twice a day, week 3 and beyond take three times a day 90 Cap 1    aspirin 81 mg chewable tablet Take 81 mg by mouth daily. ROS    Objective:   No flowsheet data found.      [INSTRUCTIONS:  \"[x]\" Indicates a positive item  \"[]\" Indicates a negative item  -- DELETE ALL ITEMS NOT EXAMINED]    Constitutional: [x] Appears well-developed and well-nourished [x] No apparent distress      [] Abnormal -     Mental status: [x] Alert and awake  [x] Oriented to person/place/time [x] Able to follow commands    [] Abnormal -     Eyes:   EOM    [x]  Normal    [] Abnormal -   Sclera  [x]  Normal    [] Abnormal -          Discharge [x]  None visible   [] Abnormal -     HENT: [x] Normocephalic, atraumatic  [] Abnormal -   [x] Mouth/Throat: Mucous membranes are moist    External Ears [x] Normal  [] Abnormal -    Neck: [x] No visualized mass [] Abnormal -     Pulmonary/Chest: [x] Respiratory effort normal   [x] No visualized signs of difficulty breathing or respiratory distress        [] Abnormal -      Musculoskeletal:   [x] Normal gait with no signs of ataxia         [x] Normal range of motion of neck        [] Abnormal -     Neurological:        [x] No Facial Asymmetry (Cranial nerve 7 motor function) (limited exam due to video visit)          [x] No gaze palsy        [] Abnormal -          Skin:        [x] No significant exanthematous lesions or discoloration noted on facial skin         [] Abnormal -            Psychiatric:       [x] Normal Affect [] Abnormal -        [x] No Hallucinations    Other pertinent observable physical exam findings:-        We discussed the expected course, resolution and complications of the diagnosis(es) in detail. Medication risks, benefits, costs, interactions, and alternatives were discussed as indicated. I advised her to contact the office if her condition worsens, changes or fails to improve as anticipated. She expressed understanding with the diagnosis(es) and plan. Kitty Peabody, who was evaluated through a patient-initiated, synchronous (real-time) audio-video encounter, and/or her healthcare decision maker, is aware that it is a billable service, with coverage as determined by her insurance carrier. She provided verbal consent to proceed: Yes, and patient identification was verified. It was conducted pursuant to the emergency declaration under the [address] [place] [address] authority and the [employer] General Act. A caregiver was present when appropriate. Ability to conduct physical exam was limited. I was at home. The patient was at home.       Mildred Ross MD

## 2020-09-09 NOTE — ACP (ADVANCE CARE PLANNING)
Advance Care Planning       Advance Care Planning (ACP) Physician/NP/PA (Provider) Conversation        Date of ACP Conversation: 9/9/2020    Conversation Conducted with:   Patient with Decision Making Moriah Mclaughlin Maker:    Current Designated Health Care Decision Maker:   (If there is a valid [place] named in the [address]" box in the ACP activity, but it is not visible above, be sure to open that field and then select the health care decision maker relationship (ie \"primary\") in the blank space to the right of the name.)    Note: Assess and validate information in current ACP documents, as indicated. If no Authorized Decision Maker has previously been identified, then patient chooses [place]:  \"Who would you like to name as your primary health care decision-maker? \"    Name: Promise Hernandez  Relationship: josue Phone number: [phone]  \"Can this person be reached easily? \" YES      Note: If the relationship of these Decision-Makers to the patient does NOT follow your state's Next of Kin hierarchy, recommend that patient complete ACP document that meets state-specific requirements to allow them to act on the patient's behalf when appropriate. Care Preferences:    Hospitalization: \"If your health worsens and it becomes clear that your chance of recovery is unlikely, what would your preference be regarding hospitalization? \"  If the patient would want hospitalization, answer \"yes\". If the patient would prefer comfort-focused treatment without hospitalization, answer \"no\". yes      Ventilation: \"If you were in your present state of health and suddenly became very ill and were unable to breathe on your own, what would your preference be about the use of a ventilator (breathing machine) if it was available to you? \"    If patient would desire the use of a ventilator (breathing machine), answer \"yes\", if not answer \"no\":no    \"If your health worsens and it becomes clear that your chance of recovery is unlikely, what would your preference be about the use of a ventilator (breathing machine) if it was available to you? \"   no      Resuscitation:  \"CPR works best to restart the heart when there is a sudden event, like a heart attack, in someone who is otherwise healthy. Unfortunately, CPR does not typically restart the heart for people who have serious health conditions or who are very sick. \"    \"In the event your heart stopped as a result of an underlying serious health condition, would you want attempts to be made to restart your heart (answer \"yes\" for attempt to resuscitate) or would you prefer a natural death (answer \"no\" for do not attempt to resuscitate)? \"   no    NOTE: If the patient has a valid advance directive AND provides care preference(s) that are inconsistent with that prior directive, advise the patient to consider either: creating a new advance directive that complies with state-specific requirements; or, if that is not possible, orally revoking that prior directive in accordance with state-specific requirements, which must be documented in the EHR.     Conversation Outcomes / Follow-Up Plan:   Completed new Advance Directive      Length of Voluntary ACP Conversation in minutes:  16 minutes      Perry Cowan MD

## 2020-09-09 NOTE — PATIENT INSTRUCTIONS
Medicare Wellness Visit, Female The best way to live healthy is to have a lifestyle where you eat a well-balanced diet, exercise regularly, limit alcohol use, and quit all forms of tobacco/nicotine, if applicable. Regular preventive services are another way to keep healthy. Preventive services (vaccines, screening tests, monitoring & exams) can help personalize your care plan, which helps you manage your own care. Screening tests can find health problems at the earliest stages, when they are easiest to treat. [facility] follows the current, evidence-based guidelines published by the [facility] ([facility]) when recommending preventive services for our patients. Because we follow these guidelines, sometimes recommendations change over time as research supports it. (For example, mammograms used to be recommended annually. Even though Medicare will still pay for an annual mammogram, the newer guidelines recommend a mammogram every two years for women of average risk). Of course, you and your doctor may decide to screen more often for some diseases, based on your risk and your co-morbidities (chronic disease you are already diagnosed with). Preventive services for you include: - Medicare offers their members a free annual wellness visit, which is time for you and your primary care provider to discuss and plan for your preventive service needs. Take advantage of this benefit every year! 
-All adults over the age of 72 should receive the recommended pneumonia vaccines. Current USPSTF guidelines recommend a series of two vaccines for the best pneumonia protection.  
-All adults should have a flu vaccine yearly and a tetanus vaccine every 10 years.  
-All adults age 48 and older should receive the shingles vaccines (series of two vaccines). -All adults age 38-68 who are overweight should have a diabetes screening test once every three years. -All adults born between [birth year] and 1966 should be screened once for Hepatitis C. 
-Other screening tests and preventive services for persons with diabetes include: an eye exam to screen for diabetic retinopathy, a kidney function test, a foot exam, and stricter control over your cholesterol.  
-Cardiovascular screening for adults with routine risk involves an electrocardiogram (ECG) at intervals determined by your doctor.  
-Colorectal cancer screenings should be done for adults age 54-65 with no increased risk factors for colorectal cancer. There are a number of acceptable methods of screening for this type of cancer. Each test has its own benefits and drawbacks. Discuss with your doctor what is most appropriate for you during your annual wellness visit. The different tests include: colonoscopy (considered the best screening method), a fecal occult blood test, a fecal DNA test, and sigmoidoscopy. 
 
-A bone mass density test is recommended when a woman turns 65 to screen for osteoporosis. This test is only recommended one time, as a screening. Some providers will use this same test as a disease monitoring tool if you already have osteoporosis. -Breast cancer screenings are recommended every other year for women of normal risk, age 54-69. 
-Cervical cancer screenings for women over age 72 are only recommended with certain risk factors. Here is a list of your current Health Maintenance items (your personalized list of preventive services) with a due date: 
Health Maintenance Due Topic Date Due  Pneumococcal Vaccine (1 of 1 - PPSV23) 05/28/2014  Shingles Vaccine (2 of 2) 12/19/2018 [facility] Annual Well Visit  07/31/2020 [facility] Diabetic Foot Care  07/31/2020  Hemoglobin A1C    07/31/2020  Albumin Urine Test  07/31/2020  Cholesterol Test   07/31/2020  Yearly Flu Vaccine (1) 09/01/2020

## 2020-09-15 DIAGNOSIS — E78.00 HYPERCHOLESTEREMIA: Primary | ICD-10-CM

## 2020-09-16 LAB
ALBUMIN SERPL-MCNC: 3.7 G/DL (ref 3.5–5)
ALBUMIN/GLOB SERPL: 0.8 {RATIO} (ref 1.1–2.2)
ALP SERPL-CCNC: 102 U/L (ref 45–117)
ALT SERPL-CCNC: 20 U/L (ref 12–78)
ANION GAP SERPL CALC-SCNC: 8 MMOL/L (ref 5–15)
AST SERPL-CCNC: 17 U/L (ref 15–37)
BILIRUB SERPL-MCNC: 0.3 MG/DL (ref 0.2–1)
BUN SERPL-MCNC: 12 MG/DL (ref 6–20)
BUN/CREAT SERPL: 12 (ref 12–20)
CALCIUM SERPL-MCNC: 10.1 MG/DL (ref 8.5–10.1)
CHLORIDE SERPL-SCNC: 103 MMOL/L (ref 97–108)
CHOLEST SERPL-MCNC: 227 MG/DL
CO2 SERPL-SCNC: 29 MMOL/L (ref 21–32)
CREAT SERPL-MCNC: 0.98 MG/DL (ref 0.55–1.02)
CREAT UR-MCNC: 192 MG/DL
EST. AVERAGE GLUCOSE BLD GHB EST-MCNC: 134 MG/DL
GLOBULIN SER CALC-MCNC: 4.8 G/DL (ref 2–4)
GLUCOSE SERPL-MCNC: 82 MG/DL (ref 65–100)
HBA1C MFR BLD: 6.3 % (ref 4–5.6)
HDLC SERPL-MCNC: 53 MG/DL
HDLC SERPL: 4.3 {RATIO} (ref 0–5)
LDLC SERPL CALC-MCNC: 149.2 MG/DL (ref 0–100)
LIPID PROFILE,FLP: ABNORMAL
MICROALBUMIN UR-MCNC: 1.06 MG/DL
MICROALBUMIN/CREAT UR-RTO: 6 MG/G (ref 0–30)
POTASSIUM SERPL-SCNC: 4 MMOL/L (ref 3.5–5.1)
PROT SERPL-MCNC: 8.5 G/DL (ref 6.4–8.2)
SODIUM SERPL-SCNC: 140 MMOL/L (ref 136–145)
TRIGL SERPL-MCNC: 124 MG/DL (ref ?–150)
VLDLC SERPL CALC-MCNC: 24.8 MG/DL

## 2020-09-18 RX ORDER — MONTELUKAST SODIUM 4 MG/1
1 TABLET, CHEWABLE ORAL 2 TIMES DAILY
Qty: 60 TAB | Refills: 1 | Status: SHIPPED | OUTPATIENT
Start: 2020-09-17 | End: 2021-03-04

## 2020-09-18 NOTE — PROGRESS NOTES
The blood sugar control has improved  The liver and kidney tests are normal  The LDL cholesterol level is slightly better but still too high. Since you have an allergy to statins listed in your chart, I suggest a different medicine. I sent the prescription to the pharmacy.  We need to recheck in 3 months

## 2020-09-21 ENCOUNTER — DOCUMENTATION ONLY (OUTPATIENT)
Dept: FAMILY MEDICINE CLINIC | Age: 70
End: 2020-09-21

## 2020-10-02 DIAGNOSIS — G47.09 OTHER INSOMNIA: ICD-10-CM

## 2020-10-02 RX ORDER — HYDROXYZINE HYDROCHLORIDE 10 MG/1
10 TABLET, FILM COATED ORAL 2 TIMES DAILY
Qty: 60 TAB | Refills: 0 | Status: SHIPPED | OUTPATIENT
Start: 2020-10-02 | End: 2020-11-01

## 2020-10-28 ENCOUNTER — CLINICAL SUPPORT (OUTPATIENT)
Dept: FAMILY MEDICINE CLINIC | Age: 70
End: 2020-10-28
Payer: MEDICARE

## 2020-10-28 VITALS
WEIGHT: 216 LBS | TEMPERATURE: 97.7 F | HEART RATE: 76 BPM | OXYGEN SATURATION: 99 % | DIASTOLIC BLOOD PRESSURE: 77 MMHG | BODY MASS INDEX: 35.99 KG/M2 | HEIGHT: 65 IN | SYSTOLIC BLOOD PRESSURE: 120 MMHG | RESPIRATION RATE: 16 BRPM

## 2020-10-28 DIAGNOSIS — Z23 ENCOUNTER FOR IMMUNIZATION: Primary | ICD-10-CM

## 2020-10-28 PROCEDURE — G0008 ADMIN INFLUENZA VIRUS VAC: HCPCS

## 2020-10-28 PROCEDURE — 90694 VACC AIIV4 NO PRSRV 0.5ML IM: CPT

## 2020-10-30 RX ORDER — BLOOD-GLUCOSE CONTROL, NORMAL
EACH MISCELLANEOUS
Qty: 1 PACKAGE | Refills: 3 | Status: SHIPPED | OUTPATIENT
Start: 2020-10-30 | End: 2021-03-04

## 2020-10-30 RX ORDER — CALCIUM CITRATE/VITAMIN D3 200MG-6.25
TABLET ORAL
Qty: 100 STRIP | Refills: 3 | Status: SHIPPED | OUTPATIENT
Start: 2020-10-30 | End: 2021-03-04

## 2020-12-08 ENCOUNTER — TELEPHONE (OUTPATIENT)
Dept: FAMILY MEDICINE CLINIC | Age: 70
End: 2020-12-08

## 2020-12-08 NOTE — TELEPHONE ENCOUNTER
----- Message from Pierre Jorgensensummer sent at 12/8/2020 11:50 AM EST -----  Regarding: Dr. Valerio Bran first and last name: N/A  Reason for call: Paperwork faxed for treatment  Callback required yes/no and why: yes  Best contact number(s): [phone], [phone]  Details to clarify the request: Pt needs paperwork faxed over to [facility] at [facility] with Dr. Betty Gomez so she can have treatment. The fax number is [phone].

## 2020-12-10 ENCOUNTER — TELEPHONE (OUTPATIENT)
Dept: FAMILY MEDICINE CLINIC | Age: 70
End: 2020-12-10

## 2020-12-11 ENCOUNTER — TELEPHONE (OUTPATIENT)
Dept: FAMILY MEDICINE CLINIC | Age: 70
End: 2020-12-11

## 2020-12-11 NOTE — TELEPHONE ENCOUNTER
----- Message from Leatha Gomez sent at 12/11/2020 12:09 PM EST -----  Regarding: Dr. Gray Vallejo: [phone]  Referral    Caller (first and last name if not the patient or from practice): n/a      Caller's relationship to patient (if not from a practice): n/a      Name of caller (if calling from a practice): n/a      Name of practice: [facility]       Specialist's title, first, and last name: [facility]      Office Phone Number: [phone]      Fax number: [phone]      Date and time of appointment:n/a      Reason for appointment: Comprehensive Therapy      Details to clarify the request: Patient needs Dr. Corey Valiente to fax over a referral for therapy. This is the patients second time calling on this.         Leatha Gomez

## 2020-12-14 ENCOUNTER — TELEPHONE (OUTPATIENT)
Dept: FAMILY MEDICINE CLINIC | Age: 70
End: 2020-12-14

## 2020-12-14 NOTE — TELEPHONE ENCOUNTER
I have been unable to reach this patient by phone. LM to call office to discuss referral.    [facility]  Representative states Veronika Bidding will call backe tomorrow to discuss referral 12/15/2020.

## 2020-12-16 ENCOUNTER — TELEPHONE (OUTPATIENT)
Dept: FAMILY MEDICINE CLINIC | Age: 70
End: 2020-12-16

## 2020-12-16 NOTE — TELEPHONE ENCOUNTER
----- Message from Lianna Ugarte sent at 12/16/2020 12:10 PM EST -----  Regarding: Dr. Candy Ulloa first and last name and relationship (if not the patient): N/A  Best contact number(s): [phone], [phone]  Whose call is being returned: Josie  Details to clarify the request: Pt needs her PT order.

## 2020-12-18 ENCOUNTER — TELEPHONE (OUTPATIENT)
Dept: FAMILY MEDICINE CLINIC | Age: 70
End: 2020-12-18

## 2020-12-18 NOTE — TELEPHONE ENCOUNTER
Two patient Identification confirmed. I spoke with the patient about Comprehensive Therapy at [facility] with Dr. Zehra Smith so she can have treatment. The fax number is [phone]. Patient states would like referral sent to office in order to get treatment for generalized pain. Please advise.

## 2020-12-20 DIAGNOSIS — G89.29 CHRONIC BACK PAIN GREATER THAN 3 MONTHS DURATION: ICD-10-CM

## 2020-12-20 DIAGNOSIS — M17.0 PRIMARY OSTEOARTHRITIS OF BOTH KNEES: Primary | ICD-10-CM

## 2020-12-20 DIAGNOSIS — G89.29 CHRONIC HIP PAIN, UNSPECIFIED LATERALITY: ICD-10-CM

## 2020-12-20 DIAGNOSIS — M54.9 CHRONIC BACK PAIN GREATER THAN 3 MONTHS DURATION: ICD-10-CM

## 2020-12-20 DIAGNOSIS — M25.559 CHRONIC HIP PAIN, UNSPECIFIED LATERALITY: ICD-10-CM

## 2020-12-21 ENCOUNTER — DOCUMENTATION ONLY (OUTPATIENT)
Dept: FAMILY MEDICINE CLINIC | Age: 70
End: 2020-12-21

## 2020-12-21 ENCOUNTER — VIRTUAL VISIT (OUTPATIENT)
Dept: FAMILY MEDICINE CLINIC | Age: 70
End: 2020-12-21
Payer: MEDICARE

## 2020-12-21 DIAGNOSIS — M48.00 SPINAL STENOSIS, UNSPECIFIED SPINAL REGION: Primary | ICD-10-CM

## 2020-12-21 PROCEDURE — 99213 OFFICE O/P EST LOW 20 MIN: CPT | Performed by: FAMILY MEDICINE

## 2020-12-21 PROCEDURE — G8432 DEP SCR NOT DOC, RNG: HCPCS | Performed by: FAMILY MEDICINE

## 2020-12-21 PROCEDURE — G8399 PT W/DXA RESULTS DOCUMENT: HCPCS | Performed by: FAMILY MEDICINE

## 2020-12-21 PROCEDURE — 1101F PT FALLS ASSESS-DOCD LE1/YR: CPT | Performed by: FAMILY MEDICINE

## 2020-12-21 PROCEDURE — G8428 CUR MEDS NOT DOCUMENT: HCPCS | Performed by: FAMILY MEDICINE

## 2020-12-21 PROCEDURE — G8536 NO DOC ELDER MAL SCRN: HCPCS | Performed by: FAMILY MEDICINE

## 2020-12-21 PROCEDURE — G9899 SCRN MAM PERF RSLTS DOC: HCPCS | Performed by: FAMILY MEDICINE

## 2020-12-21 PROCEDURE — 3017F COLORECTAL CA SCREEN DOC REV: CPT | Performed by: FAMILY MEDICINE

## 2020-12-21 PROCEDURE — G8417 CALC BMI ABV UP PARAM F/U: HCPCS | Performed by: FAMILY MEDICINE

## 2020-12-21 PROCEDURE — 1090F PRES/ABSN URINE INCON ASSESS: CPT | Performed by: FAMILY MEDICINE

## 2020-12-21 PROCEDURE — G8756 NO BP MEASURE DOC: HCPCS | Performed by: FAMILY MEDICINE

## 2020-12-21 RX ORDER — DEXTROMETHORPHAN HYDROBROMIDE, GUAIFENESIN 5; 100 MG/5ML; MG/5ML
650 LIQUID ORAL EVERY 8 HOURS
Qty: 90 TAB | Refills: 2 | Status: SHIPPED | OUTPATIENT
Start: 2020-12-21 | End: 2022-06-07

## 2020-12-21 RX ORDER — TRAMADOL HYDROCHLORIDE 50 MG/1
TABLET ORAL
COMMUNITY
Start: 2020-11-24 | End: 2021-03-04

## 2020-12-21 NOTE — PROGRESS NOTES
Chema Acuna is a 69 y.o. female who was seen by synchronous (real-time) audio-video technology on 12/21/2020 for Joint Pain (Follow up) and Other (Request compressive therapy referral)    She is going to a pain specialist  She wants a referral to a comprehensive specialist  I sent that referral already  She is now asking for medical marijuana prescription  She tried gabapentin but says it does not work for her    Assessment & Plan:   Diagnoses and all orders for this visit:    1. Spinal stenosis, unspecified spinal region  -     acetaminophen (Tylenol Arthritis Pain) 650 mg TbER; Take 1 Tab by mouth every eight (8) hours. Repeat blood in march        Subjective:       Prior to Admission medications    Medication Sig Start Date End Date Taking? Authorizing Provider   acetaminophen (Tylenol Arthritis Pain) 650 mg TbER Take 1 Tab by mouth every eight (8) hours. 12/21/20  Yes Attila Healy MD   colestipoL (Colestid) 1 gram tablet Take 1 Tab by mouth two (2) times a day. 9/17/20  Yes Attila Healy MD   lisinopriL (PRINIVIL, ZESTRIL) 10 mg tablet TAKE 1 TABLET EVERY DAY 4/15/20  Yes Attila Healy MD   triamterene-hydroCHLOROthiazide (DYAZIDE) 37.5-25 mg per capsule TAKE 1 CAPSULE EVERY DAY 11/22/19  Yes Attila Healy MD   metFORMIN (GLUCOPHAGE) 500 mg tablet TAKE 1 TABLET EVERY DAY WITH A MEAL 9/20/19  Yes Attila Healy MD   MAGNESIUM HYDROXIDE (MILK OF MAGNESIA PO)  4/27/15  Yes Provider, Historical   VITAMIN D3 2,000 unit cap capsule Take 1 Cap by mouth daily. 4/27/15  Yes Provider, Historical   traMADoL (ULTRAM) 50 mg tablet TAKE 1 TABLET BY MOUTH EVERY 6 HOURS AS NEEDED 11/24/20   Provider, Historical   glucose blood VI test strips (True Metrix Glucose Test Strip) strip Use to check blood sugar once daily. Dx: E11.9 10/30/20   Attila Healy MD   lancets (TRUEplus Lancets) 30 gauge misc Use to test blood sugar once daily.  Dx:E11.9 10/30/20   Attila Healy MD   diclofenac (VOLTAREN) 1 % gel Apply 2 g to affected area four (4) times daily. 11/12/19   Mary Aj MD   gabapentin (NEURONTIN) 300 mg capsule Week 1 take 1 tab at bedtime; week 2 take 1 tab twice a day, week 3 and beyond take three times a day 4/23/19   Natasha Che NP   alcohol swabs (BD SINGLE USE SWABS REGULAR) padm 1 Swab by Apply Externally route daily. 1/4/18   Mary Aj MD   Blood-Glucose Meter (TRUE METRIX AIR GLUCOSE METER) misc Use to test blood sugar once daily. Dx:E11.9 1/4/18   Mary Aj MD   aspirin 81 mg chewable tablet Take 81 mg by mouth daily. Provider, Historical     Patient Active Problem List    Diagnosis Date Noted    Type 2 diabetes with nephropathy ([facility] 10/31/2018    Severe obesity (BMI 35.0-39. 9) with comorbidity ([facility] 05/08/2018    Fibromyalgia 03/13/2018    Primary osteoarthritis of both knees 03/13/2018    Obesity (BMI 30-39.9) 03/13/2018    Chronic back pain greater than 3 months duration 03/13/2018    Type 2 diabetes mellitus with diabetic neuropathy ([facility] 01/16/2018    Controlled type 2 diabetes mellitus without complication, without long-term current use of insulin ([facility] 11/14/2016    Diabetes mellitus type 2, controlled ([facility] 03/30/2016    Arthritis 05/27/2015    Hypercholesteremia 05/27/2015    Essential hypertension 05/27/2015    Insomnia 05/27/2015    Hx of diabetes mellitus 05/27/2015     Current Outpatient Medications   Medication Sig Dispense Refill    acetaminophen (Tylenol Arthritis Pain) 650 mg TbER Take 1 Tab by mouth every eight (8) hours. 90 Tab 2    colestipoL (Colestid) 1 gram tablet Take 1 Tab by mouth two (2) times a day.  60 Tab 1    lisinopriL (PRINIVIL, ZESTRIL) 10 mg tablet TAKE 1 TABLET EVERY DAY 90 Tab 1    triamterene-hydroCHLOROthiazide (DYAZIDE) 37.5-25 mg per capsule TAKE 1 CAPSULE EVERY DAY 90 Cap 0    metFORMIN (GLUCOPHAGE) 500 mg tablet TAKE 1 TABLET EVERY DAY WITH A MEAL 90 Tab 3    MAGNESIUM HYDROXIDE (MILK OF MAGNESIA PO)       VITAMIN D3 2,000 unit cap capsule Take 1 Cap by mouth daily.  traMADoL (ULTRAM) 50 mg tablet TAKE 1 TABLET BY MOUTH EVERY 6 HOURS AS NEEDED      glucose blood VI test strips (True Metrix Glucose Test Strip) strip Use to check blood sugar once daily. Dx: E11.9 100 Strip 3    lancets (TRUEplus Lancets) 30 gauge misc Use to test blood sugar once daily. Dx:E11.9 1 Package 3    diclofenac (VOLTAREN) 1 % gel Apply 2 g to affected area four (4) times daily. 1 Each 2    gabapentin (NEURONTIN) 300 mg capsule Week 1 take 1 tab at bedtime; week 2 take 1 tab twice a day, week 3 and beyond take three times a day 90 Cap 1    alcohol swabs (BD SINGLE USE SWABS REGULAR) padm 1 Swab by Apply Externally route daily. 100 Pad 3    Blood-Glucose Meter (TRUE METRIX AIR GLUCOSE METER) misc Use to test blood sugar once daily. Dx:E11.9 1 Each 0    aspirin 81 mg chewable tablet Take 81 mg by mouth daily. ROS    Objective:   No flowsheet data found.      [INSTRUCTIONS:  \"[x]\" Indicates a positive item  \"[]\" Indicates a negative item  -- DELETE ALL ITEMS NOT EXAMINED]    Constitutional: [x] Appears well-developed and well-nourished [x] No apparent distress      [] Abnormal -     Mental status: [x] Alert and awake  [x] Oriented to person/place/time [x] Able to follow commands    [] Abnormal -     Eyes:   EOM    [x]  Normal    [] Abnormal -   Sclera  [x]  Normal    [] Abnormal -          Discharge [x]  None visible   [] Abnormal -     HENT: [x] Normocephalic, atraumatic  [] Abnormal -   [x] Mouth/Throat: Mucous membranes are moist    External Ears [x] Normal  [] Abnormal -    Neck: [x] No visualized mass [] Abnormal -     Pulmonary/Chest: [x] Respiratory effort normal   [x] No visualized signs of difficulty breathing or respiratory distress        [] Abnormal -      Musculoskeletal:   [x] Normal gait with no signs of ataxia         [x] Normal range of motion of neck        [] Abnormal -     Neurological:        [x] No Facial Asymmetry (Cranial nerve 7 motor function) (limited exam due to video visit)          [x] No gaze palsy        [] Abnormal -          Skin:        [x] No significant exanthematous lesions or discoloration noted on facial skin         [] Abnormal -            Psychiatric:       [x] Normal Affect [] Abnormal -        [x] No Hallucinations    Other pertinent observable physical exam findings:-        We discussed the expected course, resolution and complications of the diagnosis(es) in detail. Medication risks, benefits, costs, interactions, and alternatives were discussed as indicated. I advised her to contact the office if her condition worsens, changes or fails to improve as anticipated. She expressed understanding with the diagnosis(es) and plan. Gini Cazares, who was evaluated through a patient-initiated, synchronous (real-time) audio-video encounter, and/or her healthcare decision maker, is aware that it is a billable service, with coverage as determined by her insurance carrier. She provided verbal consent to proceed: Yes, and patient identification was verified. It was conducted pursuant to the emergency declaration under the [address] [place] [address] authority and the [employer] General Act. A caregiver was present when appropriate. Ability to conduct physical exam was limited. I was at home. The patient was at home.       Denver Bending, MD

## 2020-12-21 NOTE — PROGRESS NOTES
Referral faxed to Dr. Daljit Mosley so she can have treatment. The fax number is [phone]. Referral sent via mail to address on file.    File#: [id number]

## 2020-12-21 NOTE — PROGRESS NOTES
Patient stated name &     Chief Complaint   Patient presents with    Joint Pain     Follow up    Other     Request compressive therapy referral        Health Maintenance Due   Topic    Pneumococcal 65+ years (1 of 1 - PPSV23)    Shingrix Vaccine Age 49> (2 of 2)    Foot Exam Q1        Wt Readings from Last 3 Encounters:   10/28/20 216 lb (98 kg)   20 210 lb (95.3 kg)   20 218 lb 12.8 oz (99.2 kg)     Temp Readings from Last 3 Encounters:   10/28/20 97.7 °F (36.5 °C) (Oral)   20 98.5 °F (36.9 °C) (Oral)   19 98.6 °F (37 °C) (Oral)     BP Readings from Last 3 Encounters:   10/28/20 120/77   20 145/84   19 127/76     Pulse Readings from Last 3 Encounters:   10/28/20 76   20 74   19 92         Learning Assessment:  :     Learning Assessment 3/13/2018 2015   PRIMARY LEARNER Patient Patient   HIGHEST LEVEL OF EDUCATION - PRIMARY LEARNER  - GRADUATED HIGH SCHOOL OR GED   BARRIERS PRIMARY LEARNER - NONE   CO-LEARNER CAREGIVER No No   PRIMARY LANGUAGE ENGLISH ENGLISH    NEED - No   LEARNER PREFERENCE PRIMARY DEMONSTRATION LISTENING   LEARNING SPECIAL TOPICS - no   ANSWERED BY patient patient   RELATIONSHIP SELF SELF       Depression Screening:  :     3 most recent PHQ Screens 2020   Little interest or pleasure in doing things Not at all   Feeling down, depressed, irritable, or hopeless Not at all   Total Score PHQ 2 0       Fall Risk Assessment:  :     Fall Risk Assessment, last 12 mths 2020   Able to walk? Yes   Fall in past 12 months? No       Abuse Screening:  :     Abuse Screening Questionnaire 2016   Do you ever feel afraid of your partner? N N   Are you in a relationship with someone who physically or mentally threatens you? N N   Is it safe for you to go home? Y Y       Coordination of Care Questionnaire:  :     1) Have you been to an emergency room, urgent care clinic since your last visit?  No    Hospitalized since your last visit? No             2) Have you seen or consulted any other health care providers outside of [address] [place] [address] since your last visit? No  (Include any pap smears or colon screenings in this section.)    Patient is accompanied by VV I have received verbal consent from Sherrie Maria to discuss any/all medical information while they are present in the room.

## 2021-01-20 ENCOUNTER — TELEPHONE (OUTPATIENT)
Dept: RHEUMATOLOGY | Age: 71
End: 2021-01-20

## 2021-01-21 ENCOUNTER — OFFICE VISIT (OUTPATIENT)
Dept: RHEUMATOLOGY | Age: 71
End: 2021-01-21
Payer: MEDICARE

## 2021-01-21 VITALS
DIASTOLIC BLOOD PRESSURE: 84 MMHG | WEIGHT: 213 LBS | HEART RATE: 72 BPM | BODY MASS INDEX: 35.45 KG/M2 | RESPIRATION RATE: 18 BRPM | SYSTOLIC BLOOD PRESSURE: 124 MMHG | TEMPERATURE: 97.3 F

## 2021-01-21 DIAGNOSIS — M17.0 PRIMARY OSTEOARTHRITIS OF BOTH KNEES: Primary | ICD-10-CM

## 2021-01-21 DIAGNOSIS — M48.061 SPINAL STENOSIS OF LUMBAR REGION AT MULTIPLE LEVELS: ICD-10-CM

## 2021-01-21 DIAGNOSIS — M48.061 NEUROFORAMINAL STENOSIS OF LUMBAR SPINE: ICD-10-CM

## 2021-01-21 PROCEDURE — G8399 PT W/DXA RESULTS DOCUMENT: HCPCS | Performed by: INTERNAL MEDICINE

## 2021-01-21 PROCEDURE — G8752 SYS BP LESS 140: HCPCS | Performed by: INTERNAL MEDICINE

## 2021-01-21 PROCEDURE — 3017F COLORECTAL CA SCREEN DOC REV: CPT | Performed by: INTERNAL MEDICINE

## 2021-01-21 PROCEDURE — 1101F PT FALLS ASSESS-DOCD LE1/YR: CPT | Performed by: INTERNAL MEDICINE

## 2021-01-21 PROCEDURE — G8427 DOCREV CUR MEDS BY ELIG CLIN: HCPCS | Performed by: INTERNAL MEDICINE

## 2021-01-21 PROCEDURE — G9899 SCRN MAM PERF RSLTS DOC: HCPCS | Performed by: INTERNAL MEDICINE

## 2021-01-21 PROCEDURE — G8536 NO DOC ELDER MAL SCRN: HCPCS | Performed by: INTERNAL MEDICINE

## 2021-01-21 PROCEDURE — 1090F PRES/ABSN URINE INCON ASSESS: CPT | Performed by: INTERNAL MEDICINE

## 2021-01-21 PROCEDURE — G8754 DIAS BP LESS 90: HCPCS | Performed by: INTERNAL MEDICINE

## 2021-01-21 PROCEDURE — 99202 OFFICE O/P NEW SF 15 MIN: CPT | Performed by: INTERNAL MEDICINE

## 2021-01-21 PROCEDURE — G8417 CALC BMI ABV UP PARAM F/U: HCPCS | Performed by: INTERNAL MEDICINE

## 2021-01-21 PROCEDURE — G8510 SCR DEP NEG, NO PLAN REQD: HCPCS | Performed by: INTERNAL MEDICINE

## 2021-01-21 NOTE — PROGRESS NOTES
REASON FOR VISIT    This is the initial evaluation for Ms. Jessenia Pittman a 69 y.o. BLACK OR  female for question of an inflammatory arthritis. The patient is self-referred to the [facility]. HISTORY OF PRESENT ILLNESS      I have reviewed and summarized old records from [facility] (Aayn Wahl)    In 8/24/2016, labs showed negative KRISHNA direct, rheumatoid factor. In 10/30/2018, she was evaluated by Dr. Deni Manzano for spinal stenosis who recommended right L4-L5 BRANDI, which was done on 11/09/2018. She did not follow up with him. She did not feel relief. She then began seeing pain management who performed multiple injections without relief. In 11/15/2019, MRI Lumbar Spine without contrast showed Progression of degenerative disc disease at L4-L5 since 2017, with a new left paracentral and foraminal disc extrusion, which results in severe left lateral recess stenosis with compression of the descending left L5 nerve root. Mild spinal canal stenosis and mild to moderate left neural foraminal stenosis at L4-L5. Progression of degenerative disc disease at L1-L2, with mild to moderate bilateral neural foraminal stenosis. Unchanged severe degenerative disc disease at L5-S1 with severe right and  moderate to severe left neural foraminal stenosis. In 6/23/2020, Bilateral Knee radiograph showed RIGHT: There is severe medial and patellofemoral compartment osteoarthritis. There is moderate lateral compartment osteoarthritis. No acute fracture or dislocation. No significant joint effusion. LEFT: There is severe medial and patellofemoral compartment osteoarthritis. There is mild lateral compartment osteoarthritis with a small focus of vacuum phenomenon. No significant joint effusion. No acute fracture or dislocation. In 7/08/2020, she followed up with Dr. Igelsia Sheriff, after right hip replacement.     In 9/15/2020, labs showed creatinine 0.98 mg/dL, eGFR >60, albumin 3.7 g/dL,  U/L, ALT 20 U/L, AST 17 U/L. Today, she complains of pain in her back, legs and knees. She has constant throbbing pain in the outer aspect of her leg. This has been going on since 1998. Therapy History includes:  Current DMARD therapy includes: none  Prior DMARD therapy includes: none  The following DMARDs have been ineffective: none  The following DMARDs were stopped because of side effects: none    REVIEW OF SYSTEMS    A 15 point review of systems was performed and summarized below. The questionnaire was reviewed with the patient and scanned into the patient's medical record.     General: denies recent weight gain, recent weight loss, fatigue, weakness, fever, drenching night sweats  Musculoskeletal: endorses joint pain, muscle pain, denies joint swelling, morning stiffness  Ears: denies ringing in ears, hearing loss, deafness  Eyes: denies pain, light sensitive, redness, blindness, double vision, blurred vision, excess tearing, dryness, foreign body sensation  Mouth: denies sore tongue, oral ulcers, loss of taste, dryness, increased dental caries  Nose: denies nosebleeds, nasal ulcers  Throat: denies food stuck when swallowing, difficulty with swallowing, hoarseness, pain in jaw while chewing  Neck: denies swollen glands, tender glands  Cardiopulmonary: denies pain in chest with deep breaths, pain in chest when lying down, murmurs, sudden changes in heart beat, wheezing, dry cough, productive cough, shortness of breath at rest, shortness of breath on exertion, coughing of blood  Gastrointestinal: denies nausea, heartburn, stomach pain relieved by food, chronic constipation, chronic diarrhea, blood in stools, black stools  Genitourinary: denies vaginal dryness, pain or burning on urination, blood in urine, cloudy urine, vaginal ulcers  Hematologic: denies anemia, bleeding tendency, blood clots, bleeding gums  Skin: denies easy bruising, hair loss, rash, rash worsened after sun exposure, hives/urticaria, skin thickening, skin tightness, nodules/bumps, color changes of hands or feet in the cold (Raynaud's)  Neurologic: denies numbness or tingling in hands, numbness or tingling in feet, muscle weakness  Psychiatric: denies depression, excessive worries, PTSD, Bipolar  Sleep: endorses poor sleep, difficulty staying asleep, denies snoring, apnea, daytime somnolence, difficulty falling asleep    PAST MEDICAL HISTORY    She has a past medical history of Diabetes ([address] Hypertension, Obesity, unspecified, Other and unspecified hyperlipidemia, and Unspecified vitamin D deficiency. FAMILY HISTORY    Her family history includes Cancer in an other family member; No Known Problems in her brother, brother, sister, and sister. SOCIAL HISTORY    She reports that she quit smoking about 6 years ago. She has never used smokeless tobacco. She reports current alcohol use. She reports that she does not use drugs. MEDICATIONS    Current Outpatient Medications   Medication Sig    traMADoL (ULTRAM) 50 mg tablet TAKE 1 TABLET BY MOUTH EVERY 6 HOURS AS NEEDED    acetaminophen (Tylenol Arthritis Pain) 650 mg TbER Take 1 Tab by mouth every eight (8) hours.  glucose blood VI test strips (True Metrix Glucose Test Strip) strip Use to check blood sugar once daily. Dx: E11.9    lancets (TRUEplus Lancets) 30 gauge misc Use to test blood sugar once daily. Dx:E11.9    lisinopriL (PRINIVIL, ZESTRIL) 10 mg tablet TAKE 1 TABLET EVERY DAY    triamterene-hydroCHLOROthiazide (DYAZIDE) 37.5-25 mg per capsule TAKE 1 CAPSULE EVERY DAY    diclofenac (VOLTAREN) 1 % gel Apply 2 g to affected area four (4) times daily.  alcohol swabs (BD SINGLE USE SWABS REGULAR) padm 1 Swab by Apply Externally route daily.  Blood-Glucose Meter (TRUE METRIX AIR GLUCOSE METER) misc Use to test blood sugar once daily.  Dx:E11.9    MAGNESIUM HYDROXIDE (MILK OF MAGNESIA PO)     VITAMIN D3 2,000 unit cap capsule Take 1 Cap by mouth daily.  colestipoL (Colestid) 1 gram tablet Take 1 Tab by mouth two (2) times a day.  metFORMIN (GLUCOPHAGE) 500 mg tablet TAKE 1 TABLET EVERY DAY WITH A MEAL    gabapentin (NEURONTIN) 300 mg capsule Week 1 take 1 tab at bedtime; week 2 take 1 tab twice a day, week 3 and beyond take three times a day    aspirin 81 mg chewable tablet Take 81 mg by mouth daily. No current facility-administered medications for this visit. ALLERGIES  Allergies   Allergen Reactions    Codeine Other (comments)     \"Didn't like the way it made me feel\"    Lipitor [Atorvastatin] Other (comments)     cramps    Pcn [Penicillins] Itching    Iodine Unknown (comments)     Pt unknown of reaction stated it was years ago  Other reaction(s): Unknown (comments)  Pt unknown of reaction stated it was years ago       PHYSICAL EXAMINATION    Visit Vitals  /84   Pulse 72   Temp 97.3 °F (36.3 °C)   Resp 18   Wt 213 lb (96.6 kg)   BMI 35.45 kg/m²     Body mass index is 35.45 kg/m². General: Patient is alert, oriented x 3, not in acute distress    HEENT:   Conjunctiva are not injected and appear moist, there is no alopecia. Chest:  Breathing comfortably at room air    DATA REVIEW    Prior medical records were reviewed and are summarized as below:    Laboratory data: summarized in the HPI    Imaging: summarized in the HPI. ASSESSMENT AND PLAN    1) Neuroforaminal Stenosis with Radiculopathy. MRI Lumbar Spine on 11/15/2019 showed severe left lateral  recess stenosis with compression of the descending left L5 nerve root. Mild spinal canal stenosis and mild to moderate left neural foraminal stenosis at L4-L5. L1-L2, with mild to moderate bilateral neural foraminal stenosis. severe degenerative disc disease at L5-S1 with severe right and moderate to severe left neural foraminal stenosis. I referred her to Dr. Doris Campbell. 2) Bilateral Knee Osteoarthritis.  The patient has osteoarthritis, which is also known as \"wear and tear arthritis,\" non-inflammatory arthritis or mechanical arthritis. I referred her to Dr. Niki Cox and to Dr. Tiffanie Charles, depending on her proximity to their offices. The patient voiced understanding of the aforementioned assessment and plan. Summary of plan was provided in the After Visit Summary patient instructions. I also provided education about MyChart setup and utility. TODAY'S ORDERS    Orders Placed This Encounter   Cirilo Richardson (Hip, Joint, Knee) ORTHO ref [facility]    Carla (Spine) ORTHO ref [facility]    REFERRAL TO ORTHOPEDICS     No future appointments.     Zoey Light MD, [address] [facility]    Adult Rheumatology   Rheumatology Ultrasound Certified  [address] [facility] [address]  [facility], [address] [place] [address]   Phone [phone]  Fax [phone]    Patient Care Team:  Radha Candelario MD as PCP - General (Family Medicine)  Rony Fletcher NP as PCP - [facility] Empaneled Provider

## 2021-01-21 NOTE — LETTER
1/21/2021 Patient: Eliza Van YOB: 1950 Date of Visit: 1/21/2021 Shorty Adamson MD 
[address] [place] [id number] [address] [employer] Dear Shorty Adamson MD, Thank you for referring Ms. Yaneli Hlal to [address] [place] [address] for evaluation. My notes for this consultation are attached. If you have questions, please do not hesitate to call me. I look forward to following your patient along with you.  
 
 
Sincerely, 
 
Denver Hermann, MD

## 2021-02-04 ENCOUNTER — TRANSCRIBE ORDER (OUTPATIENT)
Dept: SCHEDULING | Age: 71
End: 2021-02-04

## 2021-02-04 DIAGNOSIS — M48.062 SPINAL STENOSIS, LUMBAR REGION WITH NEUROGENIC CLAUDICATION: Primary | ICD-10-CM

## 2021-02-12 ENCOUNTER — HOSPITAL ENCOUNTER (OUTPATIENT)
Dept: MRI IMAGING | Age: 71
Discharge: HOME | End: 2021-02-12
Attending: ORTHOPAEDIC SURGERY
Payer: MEDICARE

## 2021-02-12 DIAGNOSIS — M48.062 SPINAL STENOSIS, LUMBAR REGION WITH NEUROGENIC CLAUDICATION: ICD-10-CM

## 2021-02-12 PROCEDURE — 72148 MRI LUMBAR SPINE W/O DYE: CPT

## 2021-02-13 DIAGNOSIS — I10 ESSENTIAL HYPERTENSION: ICD-10-CM

## 2021-02-18 RX ORDER — TRIAMTERENE AND HYDROCHLOROTHIAZIDE 37.5; 25 MG/1; MG/1
CAPSULE ORAL
Qty: 90 CAP | Refills: 0 | Status: SHIPPED | OUTPATIENT
Start: 2021-02-18 | End: 2021-10-11

## 2021-03-04 ENCOUNTER — HOSPITAL ENCOUNTER (OUTPATIENT)
Dept: PREADMISSION TESTING | Age: 71
Discharge: HOME | End: 2021-03-04
Payer: MEDICARE

## 2021-03-04 VITALS
RESPIRATION RATE: 16 BRPM | OXYGEN SATURATION: 97 % | DIASTOLIC BLOOD PRESSURE: 79 MMHG | HEART RATE: 85 BPM | HEIGHT: 66 IN | SYSTOLIC BLOOD PRESSURE: 159 MMHG | TEMPERATURE: 99.1 F | BODY MASS INDEX: 34.97 KG/M2 | WEIGHT: 217.59 LBS

## 2021-03-04 LAB
25(OH)D3 SERPL-MCNC: 37 NG/ML (ref 30–100)
ABO + RH BLD: NORMAL
ALBUMIN SERPL-MCNC: 3.8 G/DL (ref 3.5–5)
ALBUMIN/GLOB SERPL: 0.8 {RATIO} (ref 1.1–2.2)
ALP SERPL-CCNC: 94 U/L (ref 45–117)
ALT SERPL-CCNC: 22 U/L (ref 12–78)
ANION GAP SERPL CALC-SCNC: 4 MMOL/L (ref 5–15)
APPEARANCE UR: CLEAR
APTT PPP: 25.2 SEC (ref 22.1–31)
AST SERPL-CCNC: 18 U/L (ref 15–37)
ATRIAL RATE: 92 BPM
BACTERIA URNS QL MICRO: NEGATIVE /HPF
BASOPHILS # BLD: 0 K/UL (ref 0–0.1)
BASOPHILS NFR BLD: 0 % (ref 0–1)
BILIRUB SERPL-MCNC: 0.4 MG/DL (ref 0.2–1)
BILIRUB UR QL: NEGATIVE
BLOOD GROUP ANTIBODIES SERPL: NORMAL
BUN SERPL-MCNC: 18 MG/DL (ref 6–20)
BUN/CREAT SERPL: 20 (ref 12–20)
CALCIUM SERPL-MCNC: 10.1 MG/DL (ref 8.5–10.1)
CALCULATED P AXIS, ECG09: 61 DEGREES
CALCULATED R AXIS, ECG10: 39 DEGREES
CALCULATED T AXIS, ECG11: 36 DEGREES
CHLORIDE SERPL-SCNC: 105 MMOL/L (ref 97–108)
CO2 SERPL-SCNC: 30 MMOL/L (ref 21–32)
COLOR UR: NORMAL
CREAT SERPL-MCNC: 0.91 MG/DL (ref 0.55–1.02)
DIAGNOSIS, 93000: NORMAL
DIFFERENTIAL METHOD BLD: ABNORMAL
EOSINOPHIL # BLD: 0.3 K/UL (ref 0–0.4)
EOSINOPHIL NFR BLD: 3 % (ref 0–7)
EPITH CASTS URNS QL MICRO: NORMAL /LPF
ERYTHROCYTE [DISTWIDTH] IN BLOOD BY AUTOMATED COUNT: 13.5 % (ref 11.5–14.5)
EST. AVERAGE GLUCOSE BLD GHB EST-MCNC: 131 MG/DL
GLOBULIN SER CALC-MCNC: 4.5 G/DL (ref 2–4)
GLUCOSE SERPL-MCNC: 89 MG/DL (ref 65–100)
GLUCOSE UR STRIP.AUTO-MCNC: NEGATIVE MG/DL
HBA1C MFR BLD: 6.2 % (ref 4–5.6)
HCT VFR BLD AUTO: 38.9 % (ref 35–47)
HGB BLD-MCNC: 12.4 G/DL (ref 11.5–16)
HGB UR QL STRIP: NEGATIVE
HYALINE CASTS URNS QL MICRO: NORMAL /LPF (ref 0–5)
IMM GRANULOCYTES # BLD AUTO: 0 K/UL (ref 0–0.04)
IMM GRANULOCYTES NFR BLD AUTO: 0 % (ref 0–0.5)
INR PPP: 1 (ref 0.9–1.1)
KETONES UR QL STRIP.AUTO: NEGATIVE MG/DL
LEUKOCYTE ESTERASE UR QL STRIP.AUTO: NEGATIVE
LYMPHOCYTES # BLD: 3.7 K/UL (ref 0.8–3.5)
LYMPHOCYTES NFR BLD: 41 % (ref 12–49)
MCH RBC QN AUTO: 28.8 PG (ref 26–34)
MCHC RBC AUTO-ENTMCNC: 31.9 G/DL (ref 30–36.5)
MCV RBC AUTO: 90.5 FL (ref 80–99)
MONOCYTES # BLD: 0.5 K/UL (ref 0–1)
MONOCYTES NFR BLD: 6 % (ref 5–13)
NEUTS SEG # BLD: 4.6 K/UL (ref 1.8–8)
NEUTS SEG NFR BLD: 50 % (ref 32–75)
NITRITE UR QL STRIP.AUTO: NEGATIVE
NRBC # BLD: 0 K/UL (ref 0–0.01)
NRBC BLD-RTO: 0 PER 100 WBC
P-R INTERVAL, ECG05: 152 MS
PH UR STRIP: 6 [PH] (ref 5–8)
PLATELET # BLD AUTO: 366 K/UL (ref 150–400)
PMV BLD AUTO: 9.3 FL (ref 8.9–12.9)
POTASSIUM SERPL-SCNC: 3.9 MMOL/L (ref 3.5–5.1)
PROT SERPL-MCNC: 8.3 G/DL (ref 6.4–8.2)
PROT UR STRIP-MCNC: NEGATIVE MG/DL
PROTHROMBIN TIME: 10.8 SEC (ref 9–11.1)
Q-T INTERVAL, ECG07: 344 MS
QRS DURATION, ECG06: 80 MS
QTC CALCULATION (BEZET), ECG08: 425 MS
RBC # BLD AUTO: 4.3 M/UL (ref 3.8–5.2)
RBC #/AREA URNS HPF: NORMAL /HPF (ref 0–5)
SODIUM SERPL-SCNC: 139 MMOL/L (ref 136–145)
SP GR UR REFRACTOMETRY: 1.01 (ref 1–1.03)
SPECIMEN EXP DATE BLD: NORMAL
THERAPEUTIC RANGE,PTTT: NORMAL SECS (ref 58–77)
UA: UC IF INDICATED,UAUC: NORMAL
UROBILINOGEN UR QL STRIP.AUTO: 0.2 EU/DL (ref 0.2–1)
VENTRICULAR RATE, ECG03: 92 BPM
WBC # BLD AUTO: 9.2 K/UL (ref 3.6–11)
WBC URNS QL MICRO: NORMAL /HPF (ref 0–4)

## 2021-03-04 PROCEDURE — 81001 URINALYSIS AUTO W/SCOPE: CPT

## 2021-03-04 PROCEDURE — 80053 COMPREHEN METABOLIC PANEL: CPT

## 2021-03-04 PROCEDURE — 36415 COLL VENOUS BLD VENIPUNCTURE: CPT

## 2021-03-04 PROCEDURE — 85610 PROTHROMBIN TIME: CPT

## 2021-03-04 PROCEDURE — 82306 VITAMIN D 25 HYDROXY: CPT

## 2021-03-04 PROCEDURE — 86901 BLOOD TYPING SEROLOGIC RH(D): CPT

## 2021-03-04 PROCEDURE — 93005 ELECTROCARDIOGRAM TRACING: CPT

## 2021-03-04 PROCEDURE — 85025 COMPLETE CBC W/AUTO DIFF WBC: CPT

## 2021-03-04 PROCEDURE — 83036 HEMOGLOBIN GLYCOSYLATED A1C: CPT

## 2021-03-04 PROCEDURE — 85730 THROMBOPLASTIN TIME PARTIAL: CPT

## 2021-03-04 RX ORDER — GABAPENTIN 300 MG/1
300 CAPSULE ORAL 2 TIMES DAILY
COMMUNITY
End: 2021-10-13 | Stop reason: SDUPTHER

## 2021-03-04 RX ORDER — LISINOPRIL 10 MG/1
10 TABLET ORAL EVERY OTHER DAY
COMMUNITY
End: 2021-07-12 | Stop reason: SDUPTHER

## 2021-03-04 NOTE — H&P
Preoperative Evaluation                     History and Physical with Surgical Risk Stratification     3/4/2021    CC: Low back pain  Surgery: L4-S1 Laminectomy     HPI:   Katarina Powell is a 69 y.o. female referred for pre-operative evaluation by Dr. Lorraine Baez for surgery on 3/16/21. Ms. Darrin Heredia states she has had back pain for many years but it got worse last year. She notes the pain as throbbing. The pain is across her low back and down both legs. Today her pain is a 10/10 in her back and right leg and a 8/10 in her left leg. Getting into bed at night and stretching out her legs make her pain worse. When she is sitting she gets relief. She denies falls but her legs are weak and she is using a cane for support. The patient was evaluated in the surgeon's office and it was determined that the most appropriate plan of care is to proceed with surgical intervention. Patient's PCP Nakia Elliott MD    Review of Systems     Constitutional: Negative for chills and fever  HENT: Negative for congestion and sore throat  Eyes: negative for blurred vision and double vision  Respiratory: Negative for cough, shortness of breath and wheezing  Mouth: Negative for loose, broken or chipped teeth. Cardiovascular: Negative for chest pain and palpitations  Gastrointestinal: Negative for abdominal pain, nausea, diarrhea & constipation  Genitourinary: Negative for dysuria and hematuria  Musculoskeletal: Low back pain, leg pain  Skin: Negative for rash, open wounds. Neurological: Negative for dizziness, tremors and headaches  Psychiatric: The patient is not nervous/anxious.     Inherent Risk of Surgery     Surgical risk:  Intermediate  Low:  EIntermediate:   Spinal surgery,  Patient Cardiac Risk Assessment     Revised Cardiac Risk Index (RCRI)    Rate if cardiac death, nonfatal MI, nonfatal cardiac arrest by number of risk factor- 0.4%    AGUSTIN/AHA 2007 Guidelines:   1) Surgery Emergency, Non-cardiac -> to surgery  2) If not, look at clinical predictors    Intermediate Minor     Blood Thinner: NA    METS      EQUAL TO 4 Care for self Walk indoors around house Walk 2-3 blocks on level ground (2-3 mph) Light work around house (dust, dishes)     Other Risk Factors:   Screening for ETOH use:  Done and low risk  Smoking status:  Former     Personal or FH of bleeding problems:  No  Personal or FH of blood clots:  No  Personal or FH of anesthesia problems:   No    Pulmonary Risk:  Asthma or COPD:  No  Body mass index is 35.12 kg/m². Known ANA:  No    Past Medical, Surgical, Social History     Allergies: Allergies   Allergen Reactions    Codeine Other (comments)     \"Didn't like the way it made me feel\"    Pcn [Penicillins] Itching    Iodine Unknown (comments)     Pt unknown of reaction stated it was years ago      Lipitor [Atorvastatin] Myalgia       Medication Documentation Review Audit     Reviewed by Danay Webster RN (Registered Nurse) on 03/04/21 at [phone]    Medication Sig Documenting Provider Last Dose Status Taking?   acetaminophen (Tylenol Arthritis Pain) 650 mg TbER Take 1 Tab by mouth every eight (8) hours. Montrell Gomez MD  Active Yes   diclofenac (VOLTAREN) 1 % gel Apply 2 g to affected area four (4) times daily. Montrell Gomez MD  Active Yes           Med Note Earl Nicholson Mar 4, 2021  3:04 PM)     gabapentin (NEURONTIN) 300 mg capsule Week 1 take 1 tab at bedtime; week 2 take 1 tab twice a day, week 3 and beyond take three times a day Bre Todd NP  Active Yes           Med Note Earl Nicholson Mar 4, 2021  3:05 PM)     lisinopriL (PRINIVIL, ZESTRIL) 10 mg tablet Take 10 mg by mouth every other day. Provider, Historical  Active Yes   psyllium (METAMUCIL) powd Take 1 Scoop by mouth every morning.  Provider, Historical  Active Yes   triamterene-hydroCHLOROthiazide (DYAZIDE) 37.5-25 mg per capsule TAKE 1 CAPSULE EVERY DAY Montrell Gomez MD  Active Yes   VITAMIN D3 2,000 unit cap capsule Take 1 Cap by mouth every morning. Provider, Historical  Active Yes           Med Note (Jana ESCALANTE   Fri Nov 3, 2017 10:34 AM)                Past Medical History:   Diagnosis Date    Hypertension     Other and unspecified hyperlipidemia     Pre-diabetes     Unspecified vitamin D deficiency      Past Surgical History:   Procedure Laterality Date    HX HIP REPLACEMENT Right 2020    HX HYSTERECTOMY  1983     Social History     Tobacco Use    Smoking status: Former Smoker     Types: Cigarettes     Quit date: 2015     Years since quittin.1    Smokeless tobacco: Never Used   Substance Use Topics    Alcohol use: Yes     Comment: socially    Drug use: No     Family History   Problem Relation Age of Onset    Cancer Other         breast    No Known Problems Sister     No Known Problems Brother     No Known Problems Sister     No Known Problems Brother     Anesth Problems Neg Hx        Objective     Vitals:    21 1455   BP: (!) 159/79   Pulse: 85   Resp: 16   Temp: 99.1 °F (37.3 °C)   SpO2: 97%   Weight: 98.7 kg (217 lb 9.5 oz)   Height: 5' 6\" (1.676 m)       Constitutional:  Appears well,  No Acute Distress, Vitals noted  Psychiatric:   Affect normal, Alert and Oriented to person/place/time    Eyes:   Pupils equally round and reactive, EOMI, conjunctiva clear, eyelids normal  ENT:   External ears and nose normal, teeth normal, gums normal, TMs and Orophyarynx normal  Neck:   General inspection and Thyroid normal.  No abnormal cervical or supraclavicular nodes    Lungs:   Clear to auscultation, good respiratory effort  Heart: Ausculation normal.  Regular rhythm. No cardiac murmurs.   No carotid bruits or palpable thrills  Chest wall normal  Musculoskeletal: Gait antalgic  Extremities:   Without edema, good peripheral pulses  Skin:   Warm to palpation, without rashes, bruising, or suspicious lesions     Recent Results (from the past 72 hour(s))   EKG, 12 LEAD, INITIAL Collection Time: 03/04/21  2:26 PM   Result Value Ref Range    Ventricular Rate 92 BPM    Atrial Rate 92 BPM    P-R Interval 152 ms    QRS Duration 80 ms    Q-T Interval 344 ms    QTC Calculation (Bezet) 425 ms    Calculated P Axis 61 degrees    Calculated R Axis 39 degrees    Calculated T Axis 36 degrees    Diagnosis       Normal sinus rhythm  Normal ECG  No previous ECGs available  Confirmed by Lenka Duran MD. ([id number]) on 3/4/2021 8:46:47 PM     CBC WITH AUTOMATED DIFF    Collection Time: 03/04/21  4:09 PM   Result Value Ref Range    WBC 9.2 3.6 - 11.0 K/uL    RBC 4.30 3.80 - 5.20 M/uL    HGB 12.4 11.5 - 16.0 g/dL    HCT 38.9 35.0 - 47.0 %    MCV 90.5 80.0 - 99.0 FL    MCH 28.8 26.0 - 34.0 PG    MCHC 31.9 30.0 - 36.5 g/dL    RDW 13.5 11.5 - 14.5 %    PLATELET 861 154 - 529 K/uL    MPV 9.3 8.9 - 12.9 FL    NRBC 0.0 0  WBC    ABSOLUTE NRBC 0.00 0.00 - 0.01 K/uL    NEUTROPHILS 50 32 - 75 %    LYMPHOCYTES 41 12 - 49 %    MONOCYTES 6 5 - 13 %    EOSINOPHILS 3 0 - 7 %    BASOPHILS 0 0 - 1 %    IMMATURE GRANULOCYTES 0 0.0 - 0.5 %    ABS. NEUTROPHILS 4.6 1.8 - 8.0 K/UL    ABS. LYMPHOCYTES 3.7 (H) 0.8 - 3.5 K/UL    ABS. MONOCYTES 0.5 0.0 - 1.0 K/UL    ABS. EOSINOPHILS 0.3 0.0 - 0.4 K/UL    ABS. BASOPHILS 0.0 0.0 - 0.1 K/UL    ABS. IMM. GRANS. 0.0 0.00 - 0.04 K/UL    DF AUTOMATED     METABOLIC PANEL, COMPREHENSIVE    Collection Time: 03/04/21  4:09 PM   Result Value Ref Range    Sodium 139 136 - 145 mmol/L    Potassium 3.9 3.5 - 5.1 mmol/L    Chloride 105 97 - 108 mmol/L    CO2 30 21 - 32 mmol/L    Anion gap 4 (L) 5 - 15 mmol/L    Glucose 89 65 - 100 mg/dL    BUN 18 6 - 20 MG/DL    Creatinine 0.91 0.55 - 1.02 MG/DL    BUN/Creatinine ratio 20 12 - 20      GFR est AA >60 >60 ml/min/1.73m2    GFR est non-AA >60 >60 ml/min/1.73m2    Calcium 10.1 8.5 - 10.1 MG/DL    Bilirubin, total 0.4 0.2 - 1.0 MG/DL    ALT (SGPT) 22 12 - 78 U/L    AST (SGOT) 18 15 - 37 U/L    Alk.  phosphatase 94 45 - 117 U/L    Protein, total 8.3 (H) 6.4 - 8.2 g/dL    Albumin 3.8 3.5 - 5.0 g/dL    Globulin 4.5 (H) 2.0 - 4.0 g/dL    A-G Ratio 0.8 (L) 1.1 - 2.2     HEMOGLOBIN A1C WITH EAG    Collection Time: 03/04/21  4:09 PM   Result Value Ref Range    Hemoglobin A1c 6.2 (H) 4.0 - 5.6 %    Est. average glucose 131 mg/dL   PROTHROMBIN TIME + INR    Collection Time: 03/04/21  4:09 PM   Result Value Ref Range    INR 1.0 0.9 - 1.1      Prothrombin time 10.8 9.0 - 11.1 sec   PTT    Collection Time: 03/04/21  4:09 PM   Result Value Ref Range    aPTT 25.2 22.1 - 31.0 sec    aPTT, therapeutic range     58.0 - 77.0 SECS   URINALYSIS W/ REFLEX CULTURE    Collection Time: 03/04/21  4:09 PM    Specimen: Urine   Result Value Ref Range    Color YELLOW/STRAW      Appearance CLEAR CLEAR      Specific gravity 1.014 1.003 - 1.030      pH (UA) 6.0 5.0 - 8.0      Protein Negative NEG mg/dL    Glucose Negative NEG mg/dL    Ketone Negative NEG mg/dL    Bilirubin Negative NEG      Blood Negative NEG      Urobilinogen 0.2 0.2 - 1.0 EU/dL    Nitrites Negative NEG      Leukocyte Esterase Negative NEG      WBC 0-4 0 - 4 /hpf    RBC 0-5 0 - 5 /hpf    Epithelial cells FEW FEW /lpf    Bacteria Negative NEG /hpf    UA:UC IF INDICATED CULTURE NOT INDICATED BY UA RESULT CNI      Hyaline cast 0-2 0 - 5 /lpf   VITAMIN D, 25 HYDROXY    Collection Time: 03/04/21  4:09 PM   Result Value Ref Range    Vitamin D 25-Hydroxy 37.0 30 - 100 ng/mL   TYPE & SCREEN    Collection Time: 03/04/21  4:09 PM   Result Value Ref Range    Crossmatch Expiration 03/18/2021,2359     ABO/Rh(D) A POSITIVE     Antibody screen NEG        Assessment and Plan     Assessment/Plan:   1) Lumbar Stenosis  2) Pre-Operative Evaluation    Labs and EKG reviewed. MRSA pending    Preoperative Clearance  Per RCRI, the patient has a 0.4% risk of cardiac death, nonfatal MI, nonfatal cardiac arrest based on no risk factors.   Per ACC/AHA guidelines, patient is low risk for a(n) intermediate risk surgery and may proceed to planned surgery with the above noted risk.     Connor Morgan, NP

## 2021-03-04 NOTE — PERIOP NOTES
[address] [address] [facility]   MAIN OR                                  [phone]   MAIN PRE OP                          [phone]                                                                                AMBULATORY PRE OP          [phone]  PRE-ADMISSION TESTING    [phone]   Surgery Date:  Tuesday March 16, 2021      Is surgery arrival time given by surgeon? YES  NO  If NO, [address] staff will call you between 3 and 7pm the day before your surgery with your arrival time. (If your surgery is on a Monday, we will call you the Friday before.)    Call [phone] after 7pm Monday-Friday if you did not receive this call. INSTRUCTIONS BEFORE YOUR SURGERY   When You  Arrive Arrive at the [address]nd [address] [facility] on the day of your surgery  Have your insurance card, photo ID, and any copayment (if needed)   Food   and   Drink NO food or drink after midnight the night before surgery    This means NO water, gum, mints, coffee, juice, etc.  No alcohol (beer, wine, liquor) 24 hours before and after surgery   Medications to   TAKE   Morning of Surgery MEDICATIONS TO TAKE THE MORNING OF SURGERY WITH A SIP OF WATER:    Gabapentin   Tylenol if needed    Do not take Lisinopril after the dose on the morning of March 15. Do NOT take Triamterene-Hydrochlorothiazide on March 16. Medications  To  STOP      7 days before surgery  Non-Steroidal anti-inflammatory Drugs (NSAID's): for example, Ibuprofen (Advil, Motrin), Naproxen (Aleve)   Aspirin, if taking for pain    Herbal supplements, vitamins, and fish oil   Diclofenac  (Pain medications not listed above, including Tylenol may be taken)   Blood  Thinners  If you take  Aspirin, Plavix, Coumadin, or any blood-thinning or anti-blood clot medicine, talk to the doctor who prescribed the medications for pre-operative instructions.    Bathing Clothing  Jewelry  Valuables      If you shower the morning of surgery, please do not apply anything to your skin (lotions, powders, deodorant, or makeup, especially mascara)   Follow Chlorhexidine Care [employer] body wash instructions provided to you during PAT appointment. Begin 3 days prior to surgery.  Do not shave or trim anywhere 24 hours before surgery   Wear your hair loose or down; no pony-tails, buns, or metal hair clips   Wear loose, comfortable, clean clothes   Wear glasses    Leave money, valuables, and jewelry, including body piercings, at home   Going Home - or Spending the Night  SAME-DAY SURGERY: You must have a responsible adult drive you home and stay with you 24 hours after surgery   ADMITS: If your doctor is keeping you in the hospital after surgery, leave personal belongings/luggage in your car until you have a hospital room number. Hospital discharge time is 12 noon  Drivers must be here before 12 noon unless you are told differently   Special Instructions It is now mandated that all surgical patients be tested for COVID-19 prior to surgery. Testing has to be exactly 4 days prior to surgery. Your COVID test date is Friday March 12, 2021 between 8:00 am and 11:00 am.       COVID testing will be performed curbside at the [facility]. There will be signs leading you to the testing site. You will need to bring a photo ID with you to be swabbed. Patients are advised to self-quarantine at home after testing and prior to your surgery date. You will be notified if your results are positive.     What to watch for:   Coronavirus (COVID-19) affects different people in different ways   It also appears with a wide range of symptoms from mild to severe   Signs usually appear 2-14 days after exposure     If you develop any of the following, notify your doctor immediately:  o Fever  o Chills, with or without a shiver  o Muscle pain  o Headache  o Sore throat  o Dry cough  o New loss of taste or smell  o Tiredness      If you develop any of the following, call 911:  o Shortness of breath  o Difficulty breathing  o Chest pain  o New confusion  o Blueness of fingers and/or lips     Spine Class on Tuesday March 9. Arrive in main front lobby at 11:45. Follow all instructions so your surgery wont be cancelled. Please, be on time. If a situation occurs and you are delayed the day of surgery, call [phone]. If your physical condition changes (like a fever, cold, flu, etc.) call your surgeon. Home medication(s) reviewed and verified via  [employer]   during PAT appointment. The patient was contacted by   IN-PERSON  The patient verbalizes understanding of all instructions and    DOES NOT   need reinforcement.

## 2021-03-06 LAB
BACTERIA SPEC CULT: NORMAL
BACTERIA SPEC CULT: NORMAL
SERVICE CMNT-IMP: NORMAL

## 2021-03-12 ENCOUNTER — HOSPITAL ENCOUNTER (OUTPATIENT)
Dept: PREADMISSION TESTING | Age: 71
Discharge: HOME | End: 2021-03-12
Payer: MEDICARE

## 2021-03-12 PROCEDURE — U0003 INFECTIOUS AGENT DETECTION BY NUCLEIC ACID (DNA OR RNA); SEVERE ACUTE RESPIRATORY SYNDROME CORONAVIRUS 2 (SARS-COV-2) (CORONAVIRUS DISEASE [COVID-19]), AMPLIFIED PROBE TECHNIQUE, MAKING USE OF HIGH THROUGHPUT TECHNOLOGIES AS DESCRIBED BY CMS-2020-01-R: HCPCS

## 2021-03-13 LAB — SARS-COV-2, COV2NT: NOT DETECTED

## 2021-03-15 ENCOUNTER — ANESTHESIA EVENT (OUTPATIENT)
Dept: SURGERY | Age: 71
End: 2021-03-15
Payer: MEDICARE

## 2021-03-16 ENCOUNTER — APPOINTMENT (OUTPATIENT)
Dept: GENERAL RADIOLOGY | Age: 71
End: 2021-03-16
Attending: ORTHOPAEDIC SURGERY
Payer: MEDICARE

## 2021-03-16 ENCOUNTER — HOSPITAL ENCOUNTER (OUTPATIENT)
Age: 71
Discharge: HOME HEALTH | End: 2021-03-19
Attending: ORTHOPAEDIC SURGERY | Admitting: ORTHOPAEDIC SURGERY
Payer: MEDICARE

## 2021-03-16 ENCOUNTER — ANESTHESIA (OUTPATIENT)
Dept: SURGERY | Age: 71
End: 2021-03-16
Payer: MEDICARE

## 2021-03-16 DIAGNOSIS — M48.062 LUMBAR STENOSIS WITH NEUROGENIC CLAUDICATION: Primary | ICD-10-CM

## 2021-03-16 PROCEDURE — 74011000258 HC RX REV CODE- 258: Performed by: ORTHOPAEDIC SURGERY

## 2021-03-16 PROCEDURE — 74011250636 HC RX REV CODE- 250/636: Performed by: NURSE ANESTHETIST, CERTIFIED REGISTERED

## 2021-03-16 PROCEDURE — 77030037202 HC SPCR SPN BULL TIP PEK VBR IBF REGE -I1: Performed by: ORTHOPAEDIC SURGERY

## 2021-03-16 PROCEDURE — C1713 ANCHOR/SCREW BN/BN,TIS/BN: HCPCS | Performed by: ORTHOPAEDIC SURGERY

## 2021-03-16 PROCEDURE — 77030033067 HC SUT PDO STRATFX SPIR J&J -B: Performed by: ORTHOPAEDIC SURGERY

## 2021-03-16 PROCEDURE — 76210000016 HC OR PH I REC 1 TO 1.5 HR: Performed by: ORTHOPAEDIC SURGERY

## 2021-03-16 PROCEDURE — 77030026438 HC STYL ET INTUB CARD -A: Performed by: ANESTHESIOLOGY

## 2021-03-16 PROCEDURE — 77030040466 HC GRFT MATRIX VIBONE REGE -I1: Performed by: ORTHOPAEDIC SURGERY

## 2021-03-16 PROCEDURE — 74011000250 HC RX REV CODE- 250: Performed by: ANESTHESIOLOGY

## 2021-03-16 PROCEDURE — 74011250637 HC RX REV CODE- 250/637: Performed by: ORTHOPAEDIC SURGERY

## 2021-03-16 PROCEDURE — 74011250636 HC RX REV CODE- 250/636: Performed by: ANESTHESIOLOGY

## 2021-03-16 PROCEDURE — 77030004391 HC BUR FLUT MEDT -C: Performed by: ORTHOPAEDIC SURGERY

## 2021-03-16 PROCEDURE — 77030014650 HC SEAL MTRX FLOSEL BAXT -C: Performed by: ORTHOPAEDIC SURGERY

## 2021-03-16 PROCEDURE — 77030040922 HC BLNKT HYPOTHRM STRY -A

## 2021-03-16 PROCEDURE — 74011250636 HC RX REV CODE- 250/636: Performed by: ORTHOPAEDIC SURGERY

## 2021-03-16 PROCEDURE — 77030018723 HC ELCTRD BLD COVD -A: Performed by: ORTHOPAEDIC SURGERY

## 2021-03-16 PROCEDURE — C9290 INJ, BUPIVACAINE LIPOSOME: HCPCS | Performed by: ORTHOPAEDIC SURGERY

## 2021-03-16 PROCEDURE — 76010000176 HC OR TIME 4.5 TO 5 HR INTENSV-TIER 1: Performed by: ORTHOPAEDIC SURGERY

## 2021-03-16 PROCEDURE — 2709999900 HC NON-CHARGEABLE SUPPLY: Performed by: ORTHOPAEDIC SURGERY

## 2021-03-16 PROCEDURE — 74018 RADEX ABDOMEN 1 VIEW: CPT

## 2021-03-16 PROCEDURE — 74011000250 HC RX REV CODE- 250: Performed by: ORTHOPAEDIC SURGERY

## 2021-03-16 PROCEDURE — 76060000040 HC ANESTHESIA 4.5 TO 5 HR: Performed by: ORTHOPAEDIC SURGERY

## 2021-03-16 PROCEDURE — 77030038552 HC DRN WND MDII -A: Performed by: ORTHOPAEDIC SURGERY

## 2021-03-16 PROCEDURE — 77030039267 HC ADH SKN EXOFIN S2SG -B: Performed by: ORTHOPAEDIC SURGERY

## 2021-03-16 PROCEDURE — 77030008684 HC TU ET CUF COVD -B: Performed by: ANESTHESIOLOGY

## 2021-03-16 PROCEDURE — 77030040361 HC SLV COMPR DVT MDII -B

## 2021-03-16 PROCEDURE — 65270000029 HC RM PRIVATE

## 2021-03-16 PROCEDURE — 77030002982 HC SUT POLYSRB J&J -A: Performed by: ORTHOPAEDIC SURGERY

## 2021-03-16 PROCEDURE — 77030031139 HC SUT VCRL2 J&J -A: Performed by: ORTHOPAEDIC SURGERY

## 2021-03-16 PROCEDURE — 77030040356 HC CORD BPLR FRCP COVD -A: Performed by: ORTHOPAEDIC SURGERY

## 2021-03-16 PROCEDURE — 77030005513 HC CATH URETH FOL11 MDII -B: Performed by: ORTHOPAEDIC SURGERY

## 2021-03-16 PROCEDURE — 77030003666 HC NDL SPINAL BD -A: Performed by: ORTHOPAEDIC SURGERY

## 2021-03-16 PROCEDURE — 77030026188 HC BN CANC CHP CRSH PR LIFV -E: Performed by: ORTHOPAEDIC SURGERY

## 2021-03-16 DEVICE — SCR BNE SPNE 6.5X45MM TI -- STREAMLINE TL: Type: IMPLANTABLE DEVICE | Site: SPINE LUMBAR | Status: FUNCTIONAL

## 2021-03-16 DEVICE — BONE CHIP CANC CRSH 1-8MM 30ML --: Type: IMPLANTABLE DEVICE | Site: SPINE LUMBAR | Status: FUNCTIONAL

## 2021-03-16 DEVICE — DURAGEN® SUTURABLE DURAL REGENERATION MATRIX, 2 IN X 2 IN (5 CM X 5 CM)
Type: IMPLANTABLE DEVICE | Site: SPINE LUMBAR | Status: FUNCTIONAL
Brand: DURAGEN® SUTURABLE

## 2021-03-16 DEVICE — SPACER SPNL BULL 22X11X13 MM INTBDY THORLUM PEEK: Type: IMPLANTABLE DEVICE | Site: SPINE LUMBAR | Status: FUNCTIONAL

## 2021-03-16 DEVICE — SCR SET SPNE STREAMLINE TL --: Type: IMPLANTABLE DEVICE | Site: SPINE LUMBAR | Status: FUNCTIONAL

## 2021-03-16 DEVICE — SCR BNE SPNE 6.5X50MM TI -- STREAMLINE TL: Type: IMPLANTABLE DEVICE | Site: SPINE LUMBAR | Status: FUNCTIONAL

## 2021-03-16 DEVICE — 5.5MM CURVED ROD 35MM TI ALLOY
Type: IMPLANTABLE DEVICE | Site: SPINE LUMBAR | Status: FUNCTIONAL
Brand: TAURUS

## 2021-03-16 RX ORDER — PROPOFOL 10 MG/ML
INJECTION, EMULSION INTRAVENOUS AS NEEDED
Status: DISCONTINUED | OUTPATIENT
Start: 2021-03-16 | End: 2021-03-16 | Stop reason: HOSPADM

## 2021-03-16 RX ORDER — MIDAZOLAM HYDROCHLORIDE 1 MG/ML
INJECTION, SOLUTION INTRAMUSCULAR; INTRAVENOUS AS NEEDED
Status: DISCONTINUED | OUTPATIENT
Start: 2021-03-16 | End: 2021-03-16 | Stop reason: HOSPADM

## 2021-03-16 RX ORDER — MELATONIN
2000 DAILY
Status: DISCONTINUED | OUTPATIENT
Start: 2021-03-17 | End: 2021-03-19 | Stop reason: HOSPADM

## 2021-03-16 RX ORDER — OXYCODONE HYDROCHLORIDE 5 MG/1
10 TABLET ORAL
Status: DISCONTINUED | OUTPATIENT
Start: 2021-03-16 | End: 2021-03-18

## 2021-03-16 RX ORDER — LIDOCAINE HYDROCHLORIDE 10 MG/ML
0.1 INJECTION, SOLUTION EPIDURAL; INFILTRATION; INTRACAUDAL; PERINEURAL AS NEEDED
Status: DISCONTINUED | OUTPATIENT
Start: 2021-03-16 | End: 2021-03-16 | Stop reason: HOSPADM

## 2021-03-16 RX ORDER — AMOXICILLIN 250 MG
1 CAPSULE ORAL 2 TIMES DAILY
Status: DISCONTINUED | OUTPATIENT
Start: 2021-03-16 | End: 2021-03-19 | Stop reason: HOSPADM

## 2021-03-16 RX ORDER — HYDROMORPHONE HYDROCHLORIDE 2 MG/ML
INJECTION, SOLUTION INTRAMUSCULAR; INTRAVENOUS; SUBCUTANEOUS AS NEEDED
Status: DISCONTINUED | OUTPATIENT
Start: 2021-03-16 | End: 2021-03-16 | Stop reason: HOSPADM

## 2021-03-16 RX ORDER — LISINOPRIL 5 MG/1
10 TABLET ORAL EVERY OTHER DAY
Status: DISCONTINUED | OUTPATIENT
Start: 2021-03-17 | End: 2021-03-19 | Stop reason: HOSPADM

## 2021-03-16 RX ORDER — SUCCINYLCHOLINE CHLORIDE 20 MG/ML
INJECTION INTRAMUSCULAR; INTRAVENOUS AS NEEDED
Status: DISCONTINUED | OUTPATIENT
Start: 2021-03-16 | End: 2021-03-16 | Stop reason: HOSPADM

## 2021-03-16 RX ORDER — ONDANSETRON 2 MG/ML
4 INJECTION INTRAMUSCULAR; INTRAVENOUS
Status: DISPENSED | OUTPATIENT
Start: 2021-03-16 | End: 2021-03-17

## 2021-03-16 RX ORDER — FACIAL-BODY WIPES
10 EACH TOPICAL DAILY PRN
Status: DISCONTINUED | OUTPATIENT
Start: 2021-03-18 | End: 2021-03-19 | Stop reason: HOSPADM

## 2021-03-16 RX ORDER — ONDANSETRON 2 MG/ML
INJECTION INTRAMUSCULAR; INTRAVENOUS AS NEEDED
Status: DISCONTINUED | OUTPATIENT
Start: 2021-03-16 | End: 2021-03-16 | Stop reason: HOSPADM

## 2021-03-16 RX ORDER — HYDROMORPHONE HCL/0.9% NACL/PF 0.5 MG/ML
PLASTIC BAG, INJECTION (ML) INTRAVENOUS
Status: DISPENSED | OUTPATIENT
Start: 2021-03-16 | End: 2021-03-17

## 2021-03-16 RX ORDER — SODIUM CHLORIDE, SODIUM LACTATE, POTASSIUM CHLORIDE, CALCIUM CHLORIDE 600; 310; 30; 20 MG/100ML; MG/100ML; MG/100ML; MG/100ML
125 INJECTION, SOLUTION INTRAVENOUS CONTINUOUS
Status: DISCONTINUED | OUTPATIENT
Start: 2021-03-16 | End: 2021-03-16 | Stop reason: HOSPADM

## 2021-03-16 RX ORDER — DIPHENHYDRAMINE HYDROCHLORIDE 50 MG/ML
12.5 INJECTION, SOLUTION INTRAMUSCULAR; INTRAVENOUS
Status: DISCONTINUED | OUTPATIENT
Start: 2021-03-16 | End: 2021-03-19 | Stop reason: HOSPADM

## 2021-03-16 RX ORDER — SODIUM CHLORIDE 0.9 % (FLUSH) 0.9 %
5-40 SYRINGE (ML) INJECTION AS NEEDED
Status: DISCONTINUED | OUTPATIENT
Start: 2021-03-16 | End: 2021-03-19 | Stop reason: HOSPADM

## 2021-03-16 RX ORDER — HYDROMORPHONE HYDROCHLORIDE 1 MG/ML
.25-1 INJECTION, SOLUTION INTRAMUSCULAR; INTRAVENOUS; SUBCUTANEOUS
Status: DISCONTINUED | OUTPATIENT
Start: 2021-03-16 | End: 2021-03-16 | Stop reason: HOSPADM

## 2021-03-16 RX ORDER — VANCOMYCIN HYDROCHLORIDE 1 G/20ML
INJECTION, POWDER, LYOPHILIZED, FOR SOLUTION INTRAVENOUS AS NEEDED
Status: DISCONTINUED | OUTPATIENT
Start: 2021-03-16 | End: 2021-03-16 | Stop reason: HOSPADM

## 2021-03-16 RX ORDER — GABAPENTIN 300 MG/1
300 CAPSULE ORAL 2 TIMES DAILY
Status: DISCONTINUED | OUTPATIENT
Start: 2021-03-16 | End: 2021-03-19 | Stop reason: HOSPADM

## 2021-03-16 RX ORDER — HYDROMORPHONE HYDROCHLORIDE 1 MG/ML
0.5 INJECTION, SOLUTION INTRAMUSCULAR; INTRAVENOUS; SUBCUTANEOUS
Status: DISPENSED | OUTPATIENT
Start: 2021-03-16 | End: 2021-03-17

## 2021-03-16 RX ORDER — PROPOFOL 10 MG/ML
INJECTION, EMULSION INTRAVENOUS
Status: DISCONTINUED | OUTPATIENT
Start: 2021-03-16 | End: 2021-03-16 | Stop reason: HOSPADM

## 2021-03-16 RX ORDER — POLYETHYLENE GLYCOL 3350 17 G/17G
17 POWDER, FOR SOLUTION ORAL DAILY
Status: DISCONTINUED | OUTPATIENT
Start: 2021-03-17 | End: 2021-03-19 | Stop reason: HOSPADM

## 2021-03-16 RX ORDER — SODIUM CHLORIDE 9 MG/ML
125 INJECTION, SOLUTION INTRAVENOUS CONTINUOUS
Status: DISPENSED | OUTPATIENT
Start: 2021-03-16 | End: 2021-03-17

## 2021-03-16 RX ORDER — HYDROMORPHONE HYDROCHLORIDE 2 MG/1
2 TABLET ORAL
Status: DISCONTINUED | OUTPATIENT
Start: 2021-03-16 | End: 2021-03-18

## 2021-03-16 RX ORDER — SODIUM CHLORIDE 0.9 % (FLUSH) 0.9 %
5-40 SYRINGE (ML) INJECTION EVERY 8 HOURS
Status: DISCONTINUED | OUTPATIENT
Start: 2021-03-16 | End: 2021-03-19 | Stop reason: HOSPADM

## 2021-03-16 RX ORDER — SODIUM CHLORIDE 0.9 % (FLUSH) 0.9 %
5-40 SYRINGE (ML) INJECTION EVERY 8 HOURS
Status: DISCONTINUED | OUTPATIENT
Start: 2021-03-16 | End: 2021-03-16 | Stop reason: HOSPADM

## 2021-03-16 RX ORDER — PHENYLEPHRINE HCL IN 0.9% NACL 0.4MG/10ML
SYRINGE (ML) INTRAVENOUS AS NEEDED
Status: DISCONTINUED | OUTPATIENT
Start: 2021-03-16 | End: 2021-03-16 | Stop reason: HOSPADM

## 2021-03-16 RX ORDER — SODIUM CHLORIDE, SODIUM LACTATE, POTASSIUM CHLORIDE, CALCIUM CHLORIDE 600; 310; 30; 20 MG/100ML; MG/100ML; MG/100ML; MG/100ML
75 INJECTION, SOLUTION INTRAVENOUS CONTINUOUS
Status: DISCONTINUED | OUTPATIENT
Start: 2021-03-16 | End: 2021-03-16 | Stop reason: HOSPADM

## 2021-03-16 RX ORDER — CYCLOBENZAPRINE HCL 10 MG
10 TABLET ORAL
Status: DISCONTINUED | OUTPATIENT
Start: 2021-03-16 | End: 2021-03-19 | Stop reason: HOSPADM

## 2021-03-16 RX ORDER — FENTANYL CITRATE 50 UG/ML
INJECTION, SOLUTION INTRAMUSCULAR; INTRAVENOUS AS NEEDED
Status: DISCONTINUED | OUTPATIENT
Start: 2021-03-16 | End: 2021-03-16 | Stop reason: HOSPADM

## 2021-03-16 RX ORDER — DEXAMETHASONE SODIUM PHOSPHATE 4 MG/ML
INJECTION, SOLUTION INTRA-ARTICULAR; INTRALESIONAL; INTRAMUSCULAR; INTRAVENOUS; SOFT TISSUE AS NEEDED
Status: DISCONTINUED | OUTPATIENT
Start: 2021-03-16 | End: 2021-03-16 | Stop reason: HOSPADM

## 2021-03-16 RX ORDER — SODIUM CHLORIDE, SODIUM LACTATE, POTASSIUM CHLORIDE, CALCIUM CHLORIDE 600; 310; 30; 20 MG/100ML; MG/100ML; MG/100ML; MG/100ML
100 INJECTION, SOLUTION INTRAVENOUS CONTINUOUS
Status: DISCONTINUED | OUTPATIENT
Start: 2021-03-16 | End: 2021-03-16 | Stop reason: HOSPADM

## 2021-03-16 RX ORDER — DIPHENHYDRAMINE HYDROCHLORIDE 50 MG/ML
12.5 INJECTION, SOLUTION INTRAMUSCULAR; INTRAVENOUS AS NEEDED
Status: DISCONTINUED | OUTPATIENT
Start: 2021-03-16 | End: 2021-03-16 | Stop reason: HOSPADM

## 2021-03-16 RX ORDER — ACETAMINOPHEN 325 MG/1
650 TABLET ORAL
Status: DISCONTINUED | OUTPATIENT
Start: 2021-03-16 | End: 2021-03-19 | Stop reason: HOSPADM

## 2021-03-16 RX ORDER — ROCURONIUM BROMIDE 10 MG/ML
INJECTION, SOLUTION INTRAVENOUS AS NEEDED
Status: DISCONTINUED | OUTPATIENT
Start: 2021-03-16 | End: 2021-03-16 | Stop reason: HOSPADM

## 2021-03-16 RX ORDER — NALOXONE HYDROCHLORIDE 0.4 MG/ML
0.4 INJECTION, SOLUTION INTRAMUSCULAR; INTRAVENOUS; SUBCUTANEOUS AS NEEDED
Status: DISCONTINUED | OUTPATIENT
Start: 2021-03-16 | End: 2021-03-19 | Stop reason: HOSPADM

## 2021-03-16 RX ORDER — ONDANSETRON 2 MG/ML
4 INJECTION INTRAMUSCULAR; INTRAVENOUS AS NEEDED
Status: DISCONTINUED | OUTPATIENT
Start: 2021-03-16 | End: 2021-03-16 | Stop reason: HOSPADM

## 2021-03-16 RX ORDER — TRIAMTERENE/HYDROCHLOROTHIAZID 37.5-25 MG
1 TABLET ORAL DAILY
Status: DISCONTINUED | OUTPATIENT
Start: 2021-03-17 | End: 2021-03-19 | Stop reason: HOSPADM

## 2021-03-16 RX ORDER — SODIUM CHLORIDE 0.9 % (FLUSH) 0.9 %
5-40 SYRINGE (ML) INJECTION AS NEEDED
Status: DISCONTINUED | OUTPATIENT
Start: 2021-03-16 | End: 2021-03-16 | Stop reason: HOSPADM

## 2021-03-16 RX ORDER — FAMOTIDINE 20 MG/1
20 TABLET, FILM COATED ORAL 2 TIMES DAILY
Status: DISCONTINUED | OUTPATIENT
Start: 2021-03-16 | End: 2021-03-19 | Stop reason: HOSPADM

## 2021-03-16 RX ADMIN — MIDAZOLAM HYDROCHLORIDE 2 MG: 2 INJECTION, SOLUTION INTRAMUSCULAR; INTRAVENOUS at 13:35

## 2021-03-16 RX ADMIN — SUCCINYLCHOLINE CHLORIDE 140 MG: 20 INJECTION, SOLUTION INTRAMUSCULAR; INTRAVENOUS at 13:41

## 2021-03-16 RX ADMIN — PROPOFOL 100 MCG/KG/MIN: 10 INJECTION, EMULSION INTRAVENOUS at 14:06

## 2021-03-16 RX ADMIN — CEFAZOLIN SODIUM 0.2 G: 10 INJECTION, POWDER, FOR SOLUTION INTRAVENOUS at 12:54

## 2021-03-16 RX ADMIN — ROCURONIUM BROMIDE 10 MG: 10 INJECTION INTRAVENOUS at 13:46

## 2021-03-16 RX ADMIN — SODIUM CHLORIDE 125 ML/HR: 9 INJECTION, SOLUTION INTRAVENOUS at 18:53

## 2021-03-16 RX ADMIN — ONDANSETRON HYDROCHLORIDE 4 MG: 2 SOLUTION INTRAMUSCULAR; INTRAVENOUS at 13:52

## 2021-03-16 RX ADMIN — SODIUM CHLORIDE, POTASSIUM CHLORIDE, SODIUM LACTATE AND CALCIUM CHLORIDE: 600; 310; 30; 20 INJECTION, SOLUTION INTRAVENOUS at 17:26

## 2021-03-16 RX ADMIN — FENTANYL CITRATE 250 MCG: 50 INJECTION, SOLUTION INTRAMUSCULAR; INTRAVENOUS at 13:42

## 2021-03-16 RX ADMIN — Medication 40 MCG: at 14:48

## 2021-03-16 RX ADMIN — PROPOFOL 130 MG: 10 INJECTION, EMULSION INTRAVENOUS at 13:44

## 2021-03-16 RX ADMIN — CEFAZOLIN 2 G: 1 INJECTION, POWDER, FOR SOLUTION INTRAMUSCULAR; INTRAVENOUS at 20:46

## 2021-03-16 RX ADMIN — Medication 40 MCG: at 15:07

## 2021-03-16 RX ADMIN — SODIUM CHLORIDE, POTASSIUM CHLORIDE, SODIUM LACTATE AND CALCIUM CHLORIDE 100 ML/HR: 600; 310; 30; 20 INJECTION, SOLUTION INTRAVENOUS at 12:53

## 2021-03-16 RX ADMIN — BENZOCAINE AND MENTHOL 1 LOZENGE: 15; 3.6 LOZENGE ORAL at 22:19

## 2021-03-16 RX ADMIN — HYDROMORPHONE HYDROCHLORIDE 0.5 MG: 2 INJECTION INTRAMUSCULAR; INTRAVENOUS; SUBCUTANEOUS at 13:52

## 2021-03-16 RX ADMIN — SUCCINYLCHOLINE CHLORIDE 40 MG: 20 INJECTION, SOLUTION INTRAMUSCULAR; INTRAVENOUS at 13:44

## 2021-03-16 RX ADMIN — DEXAMETHASONE SODIUM PHOSPHATE 4 MG: 4 INJECTION, SOLUTION INTRAMUSCULAR; INTRAVENOUS at 13:52

## 2021-03-16 RX ADMIN — HYDROMORPHONE HYDROCHLORIDE 1.5 MG: 2 INJECTION INTRAMUSCULAR; INTRAVENOUS; SUBCUTANEOUS at 14:33

## 2021-03-16 RX ADMIN — PROPOFOL 120 MG: 10 INJECTION, EMULSION INTRAVENOUS at 13:41

## 2021-03-16 RX ADMIN — Medication 10 ML: at 23:05

## 2021-03-16 RX ADMIN — Medication: at 18:46

## 2021-03-16 RX ADMIN — ROCURONIUM BROMIDE 5 MG: 10 INJECTION INTRAVENOUS at 13:37

## 2021-03-16 NOTE — ANESTHESIA POSTPROCEDURE EVALUATION
Procedure(s):  L4-S1 LAMINECTOMY/ L4-5 FUSION INSTRUMENTATION/TLIF/BONEGRAFT. general    Anesthesia Post Evaluation      Multimodal analgesia: multimodal analgesia not used between 6 hours prior to anesthesia start to PACU discharge  Patient location during evaluation: PACU  Patient participation: complete - patient participated  Level of consciousness: awake  Pain management: adequate  Airway patency: patent  Anesthetic complications: no  Cardiovascular status: acceptable, blood pressure returned to baseline and hemodynamically stable  Respiratory status: acceptable  Hydration status: acceptable  Post anesthesia nausea and vomiting:  controlled      INITIAL Post-op Vital signs:   Vitals Value Taken Time   /91 03/16/21 1840   Temp 36.6 °C (97.8 °F) 03/16/21 1812   Pulse 109 03/16/21 1844   Resp 26 03/16/21 1844   SpO2 96 % 03/16/21 1844   Vitals shown include unvalidated device data.

## 2021-03-16 NOTE — PERIOP NOTES
TRANSFER - OUT REPORT:    Verbal report given to connie kim on Rodolfo Woodwardon  being transferred to [facility]( for routine post - op       Report consisted of patients Situation, Background, Assessment and   Recommendations(SBAR). Information from the following report(s) SBAR, OR Summary, Procedure Summary, Intake/Output, MAR and Cardiac Rhythm nsr was reviewed with the receiving nurse. Lines:   Peripheral IV 03/16/21 Left; Inner Wrist (Active)   Site Assessment Clean, dry, & intact 03/16/21 1812   Phlebitis Assessment 0 03/16/21 1812   Infiltration Assessment 0 03/16/21 1812   Dressing Status Clean, dry, & intact; Occlusive 03/16/21 1812   Dressing Type Tape;Transparent 03/16/21 1812   Hub Color/Line Status Pink;Capped; Other (comment) 03/16/21 1812   Alcohol Cap Used Yes 03/16/21 1253       Peripheral IV 03/16/21 Posterior;Right Hand (Active)   Site Assessment Clean, dry, & intact 03/16/21 1812   Phlebitis Assessment 0 03/16/21 1812   Infiltration Assessment 0 03/16/21 1812   Dressing Status Clean, dry, & intact; Occlusive 03/16/21 1812   Dressing Type Tape;Transparent 03/16/21 1812   Hub Color/Line Status Pink; Infusing 03/16/21 1812        Opportunity for questions and clarification was provided.       Patient transported with:   O2 @ 2 liters  Registered Nurse

## 2021-03-16 NOTE — PERIOP NOTES
RECEIVED CALL FROM ROSEMARIE IN RADIOLOGY. DR. Jhon Dahl STATED THE 3 CLIPS IN THE SPINE LOOK LIKE THE NEUROPATTY WE WERE LOOKING FOR, BUT SHE DOES NOT BELIEVE THERE IS A SPONGE IN PATIENT. Leonardo Figueroa DID NOT RECOMMEND A LATERAL AT THIS TIME. RELAYED THIS TO DR. Afshan Nicholson. DR. Afshan Nicholson REVIEWED THE XRAY AND WAS GOOD TO PROCEED WITH CLOSING. IMPRESSION FROM REPORT ATTACHED:   No definite evidence for a retained surgical sponge.  These findings  have been relayed to the operating room personnel by Cristina Croft, radiologic  technologist.

## 2021-03-16 NOTE — OP NOTES
[facility]  [address], [address] [facility]    OPERATIVE REPORT      NAME: Ashanti Gong    AGE: 69 y.o. YOB: 1950    MEDICAL RECORD NUMBER: [id number]    DATE OF SURGERY: 3/16/2021    PREOPERATIVE DIAGNOSES:  1. Lumbar stenosis. 2. Acquired lumbar spondylolisthesis. POSTOPERATIVE DIAGNOSES:  1. Lumbar stenosis. 2. Acquired lumbar spondylolisthesis. OPERATIVE PROCEDURES:   1. Laminectomy, partial facetectomy, and foraminotomy of L4 and L5.   2. Laminectomy of S1.   3. Posterolateral fusion and posterior lumbar interbody fusion of L4-5. 4. Pedicle screw instrumentation with RTI pedicle screws for L4 and L5 bilaterally. 5. Application of biomechanical RTI intervertebral body device at L4-5 with RTI. 6. Morselized allograft for spine surgery and local autograft for spine surgery with stem cells. SURGEON: Socorro Prescott MD     ASSISTANT: GABRIEL Agrawal    Specimens - none    Implants - see above    ANESTHESIA: General    ESTIMATED BLOOD LOSS: 273 cc    COMPLICATIONS: None apparent    NEUROMONITORING: We used SSEPs and spontaneous EMGs with pedicle screw stimulation    INDICATION: The patient is a very pleasant 69 y.o. female with debilitating leg pain and back pain. She failed conservative measures. She elected to proceed with operative intervention. She was aware of the risks, benefits, and alternatives. She provided informed consent. DESCRIPTION OF PROCEDURE: The patient was identified in the preoperative holding area. The lumbar spine was marked by me. She was transferred to the operating room where general anesthesia was given. She was also given perioperative ancef ntibiotics. She was placed prone on the Carlyn Ao table. All bony prominences were well padded. The lumbar spine was prepped and draped in the usual standard fashion. We performed a surgical time out. I made a skin incision from L3 to L5.  I exposed the posterior lumbar spine in standard fashion. I took intraoperative fluoroscopy to verify our levels. I exposed the transverse processes of L4 and L5 bilaterally. I then began my decompression by performing a laminectomy of L4. I also performed a partial facetectomy and foraminotomy to decompress the thecal sac and nerve roots of L4. I also performed a laminectomy of L5 with bilateral partial facetectomy and foraminotomy to decompress the thecal sac and traversing L5 nerve roots. I decompressed the stenosis found within the foramen and lateral to the foramen for L4-L5 on the left side. At this point our transforaminal approach to L4-L5 on the left side was complete with exposure of the left L4-5 disc space. I next did a laminectomy of S1. I then performed a standard discectomy at L4-5. I prepared the endplates to bleeding bone. I performed trial sizing. I placed a biomechanical device into L4-5 with the appropriate amount of tension and alignment. I placed bone graft. I then cannulated our pedicles for L4 and L5 bilaterally in standard fashion. I probed each pedicle. I copiously irrigated the entire wound. I decorticated our fusion beds bilaterally with a high-speed jacob. I placed our bone graft into our fusions beds bilaterally. I then placed pedicle screws for L4 and L5 bilaterally in standard fashion under fluoroscopic guidance. I had good purchase for each pedicle. I then stimulated all 4 pedicle screws with pedicle screw stimulation. The pedicle screws were found to be within the appropriate range of amplitude. I then placed contoured rods on top of the pedicles bilaterally. The rods were locked to the pedicle screws according to the 's specification with set screws. We had good hemostasis. There was no CSF leaking. The fusion beds were packed with morselized allograft and local autograft. The exposed neurologic elements were protected with Duragen.  I injected the soft tissues with a pain management cocktail. A deep drain was placed. The wound was closed in layers. A sterile dressing was applied. The patient was extubated and transferred to the recovery room in good medical condition. The PA assisted with retraction and closure. I, Dr. Fifi Baptiste, performed the above procedures.      Fifi Baptiste MD  3/16/2021

## 2021-03-16 NOTE — H&P
Date of Surgery Update:  Amaya Renee was seen and examined. History and physical has been reviewed. The patient has been examined.  There have been no significant clinical changes since the completion of the originally dated History and Physical.    Signed By: Arturo Wells MD     March 16, 2021 12:38 PM

## 2021-03-16 NOTE — ANESTHESIA PREPROCEDURE EVALUATION
Relevant Problems   CARDIOVASCULAR   (+) Essential hypertension      ENDOCRINE   (+) Arthritis   (+) Controlled type 2 diabetes mellitus without complication, without long-term current use of insulin (HCC)   (+) Diabetes mellitus type 2, controlled (HCC)   (+) Severe obesity (BMI 35.0-39. 9) with comorbidity (HCC)   (+) Type 2 diabetes mellitus with diabetic neuropathy (HCC)   (+) Type 2 diabetes with nephropathy (HCC)       Anesthetic History   No history of anesthetic complications            Review of Systems / Medical History  Patient summary reviewed, nursing notes reviewed and pertinent labs reviewed    Pulmonary  Within defined limits                 Neuro/Psych   Within defined limits           Cardiovascular  Within defined limits  Hypertension                   GI/Hepatic/Renal  Within defined limits              Endo/Other  Within defined limits  Diabetes    Morbid obesity and arthritis     Other Findings              Physical Exam    Airway  Mallampati: III  TM Distance: 4 - 6 cm  Neck ROM: normal range of motion   Mouth opening: Normal     Cardiovascular    Rhythm: regular  Rate: normal         Dental  No notable dental hx       Pulmonary  Breath sounds clear to auscultation               Abdominal         Other Findings            Anesthetic Plan    ASA: 2  Anesthesia type: general            Anesthetic plan and risks discussed with: Patient

## 2021-03-17 PROBLEM — M48.061 LUMBAR SPINAL STENOSIS: Status: ACTIVE | Noted: 2021-03-17

## 2021-03-17 LAB
ANION GAP SERPL CALC-SCNC: 6 MMOL/L (ref 5–15)
BUN SERPL-MCNC: 15 MG/DL (ref 6–20)
BUN/CREAT SERPL: 15 (ref 12–20)
CALCIUM SERPL-MCNC: 9.4 MG/DL (ref 8.5–10.1)
CHLORIDE SERPL-SCNC: 107 MMOL/L (ref 97–108)
CO2 SERPL-SCNC: 28 MMOL/L (ref 21–32)
COMMENT, HOLDF: NORMAL
CREAT SERPL-MCNC: 1.03 MG/DL (ref 0.55–1.02)
GLUCOSE BLD STRIP.AUTO-MCNC: 93 MG/DL (ref 65–100)
GLUCOSE SERPL-MCNC: 119 MG/DL (ref 65–100)
HGB BLD-MCNC: 11.4 G/DL (ref 11.5–16)
POTASSIUM SERPL-SCNC: 4.2 MMOL/L (ref 3.5–5.1)
SAMPLES BEING HELD,HOLD: NORMAL
SERVICE CMNT-IMP: NORMAL
SODIUM SERPL-SCNC: 141 MMOL/L (ref 136–145)

## 2021-03-17 PROCEDURE — 97530 THERAPEUTIC ACTIVITIES: CPT

## 2021-03-17 PROCEDURE — 85018 HEMOGLOBIN: CPT

## 2021-03-17 PROCEDURE — 74011250637 HC RX REV CODE- 250/637: Performed by: ORTHOPAEDIC SURGERY

## 2021-03-17 PROCEDURE — 97535 SELF CARE MNGMENT TRAINING: CPT

## 2021-03-17 PROCEDURE — 82962 GLUCOSE BLOOD TEST: CPT

## 2021-03-17 PROCEDURE — 36415 COLL VENOUS BLD VENIPUNCTURE: CPT

## 2021-03-17 PROCEDURE — 94761 N-INVAS EAR/PLS OXIMETRY MLT: CPT

## 2021-03-17 PROCEDURE — 97161 PT EVAL LOW COMPLEX 20 MIN: CPT

## 2021-03-17 PROCEDURE — 94760 N-INVAS EAR/PLS OXIMETRY 1: CPT

## 2021-03-17 PROCEDURE — 80048 BASIC METABOLIC PNL TOTAL CA: CPT

## 2021-03-17 PROCEDURE — 74011250636 HC RX REV CODE- 250/636: Performed by: ORTHOPAEDIC SURGERY

## 2021-03-17 PROCEDURE — 77010033678 HC OXYGEN DAILY

## 2021-03-17 PROCEDURE — 97116 GAIT TRAINING THERAPY: CPT

## 2021-03-17 PROCEDURE — 97165 OT EVAL LOW COMPLEX 30 MIN: CPT

## 2021-03-17 PROCEDURE — 96374 THER/PROPH/DIAG INJ IV PUSH: CPT

## 2021-03-17 PROCEDURE — 74011000250 HC RX REV CODE- 250: Performed by: ORTHOPAEDIC SURGERY

## 2021-03-17 RX ADMIN — CEFAZOLIN 2 G: 1 INJECTION, POWDER, FOR SOLUTION INTRAMUSCULAR; INTRAVENOUS at 12:31

## 2021-03-17 RX ADMIN — TRIAMTERENE AND HYDROCHLOROTHIAZIDE 1 TABLET: 37.5; 25 TABLET ORAL at 15:15

## 2021-03-17 RX ADMIN — DOCUSATE SODIUM 50 MG AND SENNOSIDES 8.6 MG 1 TABLET: 8.6; 5 TABLET, FILM COATED ORAL at 18:38

## 2021-03-17 RX ADMIN — DOCUSATE SODIUM 50 MG AND SENNOSIDES 8.6 MG 1 TABLET: 8.6; 5 TABLET, FILM COATED ORAL at 10:01

## 2021-03-17 RX ADMIN — HYDROMORPHONE HYDROCHLORIDE 2 MG: 2 TABLET ORAL at 10:01

## 2021-03-17 RX ADMIN — FAMOTIDINE 20 MG: 20 TABLET, FILM COATED ORAL at 18:38

## 2021-03-17 RX ADMIN — GABAPENTIN 300 MG: 300 CAPSULE ORAL at 10:01

## 2021-03-17 RX ADMIN — HYDROMORPHONE HYDROCHLORIDE 2 MG: 2 TABLET ORAL at 22:07

## 2021-03-17 RX ADMIN — POLYETHYLENE GLYCOL 3350 17 G: 17 POWDER, FOR SOLUTION ORAL at 10:00

## 2021-03-17 RX ADMIN — Medication 10 ML: at 12:03

## 2021-03-17 RX ADMIN — LISINOPRIL 10 MG: 5 TABLET ORAL at 15:16

## 2021-03-17 RX ADMIN — Medication 10 ML: at 20:16

## 2021-03-17 RX ADMIN — Medication 10 ML: at 04:50

## 2021-03-17 RX ADMIN — CEFAZOLIN 2 G: 1 INJECTION, POWDER, FOR SOLUTION INTRAMUSCULAR; INTRAVENOUS at 04:50

## 2021-03-17 RX ADMIN — ONDANSETRON 4 MG: 2 INJECTION INTRAMUSCULAR; INTRAVENOUS at 17:08

## 2021-03-17 RX ADMIN — Medication 2000 UNITS: at 10:01

## 2021-03-17 RX ADMIN — FAMOTIDINE 20 MG: 20 TABLET, FILM COATED ORAL at 10:01

## 2021-03-17 RX ADMIN — HYDROMORPHONE HYDROCHLORIDE 0.5 MG: 1 INJECTION, SOLUTION INTRAMUSCULAR; INTRAVENOUS; SUBCUTANEOUS at 20:15

## 2021-03-17 RX ADMIN — GABAPENTIN 300 MG: 300 CAPSULE ORAL at 18:37

## 2021-03-17 RX ADMIN — HYDROMORPHONE HYDROCHLORIDE 2 MG: 2 TABLET ORAL at 14:41

## 2021-03-17 RX ADMIN — Medication 10 ML: at 17:08

## 2021-03-17 NOTE — PROGRESS NOTES
Reason for Admission:  Laminectomy                      RUR Score:  10%                   Plan for utilizing home health:  Pateint will have  PT/OT awaiting evaluations and choices list given        PCP: First and Last name:  Rk Rodriguez MD     Name of Practice:  [facility] in [place]    Are you a current patient: Yes/No: Yes   Approximate date of last visit: 12/17/2020   Can you participate in a virtual visit with your PCP:  Yes                     Current Advanced Directive/Advance Care Plan:  Has ACP documents       Healthcare Decision Maker:     Jayde Husbands [phone] Daughter                              Transition of Care Plan:   The patient lives alone and her daughter will be staying with her and will also transport the patient home. The patient lives in a 2 story home with 4 steps to enter and 13 steps to her bedroom on the second floor to the patient bedroom. The pateint has prescription coverage and obtains her prescriptions from [facility] in [address] [place] [address]. The patient has all DME for discharge , RW, Cane, Back Brace. The patient will have  [facility] services for PT/OT. The patient was independent with all ADL's prior to this admission and was able to drive self. Patient has her follow up scheduled with Dr Brooke Jessica on 4/8/2021    Transition Care Plan     1. Family will transport upon discharge  2. Patient will follow up with PCP and specialities   3. Patient will have  [facility] upon discharge awaiting PT/OT evaluations and choices  4.  CM will follow for discharge needs    Care Management Interventions  PCP Verified by CM: Charity Quezada )  Last Visit to PCP: 12/17/20  Mode of Transport at Discharge: Self  Transition of Care Consult (CM Consult): Home Health, Discharge Planning  Discharge Durable Medical Equipment: No  Physical Therapy Consult: Yes  Occupational Therapy Consult: Yes  Current Support Network: Own Home, Family Lives [place], Lives Alone  Confirm Follow Up Transport: Family  The Patient and/or Patient Representative was Provided with a Choice of Provider and Agrees with the Discharge Plan?: Yes  Freedom of Choice List was Provided with Basic Dialogue that Supports the Patient's Individualized Plan of Care/Goals, Treatment Preferences and Shares the Quality Data Associated with the Providers?: Yes  Discharge Location  Discharge Placement: (S) Home with home health      Ignacio Eye RN CCM

## 2021-03-17 NOTE — PROGRESS NOTES
3/17/2021  2:16 PM  Virtual reviewer communicated change to CM, which reflect outpatient observation order written prior to Written discharge order. Code 44 delivered and given to patient. CM met with the patient and provided to the patient the printed information about her outpatient observation status. The patient was given the flyer entitled, \"Medicare Outpatient Observation: Information & notification. \" All questions were answered, no additional discharge needs identified at this time and patient expects to return to their (home, assisted living facility, relatives home, etc.) after discharge.

## 2021-03-17 NOTE — PROGRESS NOTES
4:14 PM   03/17/21     Patient has been accepted by [facility] for PT/OT upon discharged.     Mariam Melo RN CCM

## 2021-03-17 NOTE — PROGRESS NOTES
Problem: Self Care Deficits Care Plan (Adult)  Goal: *Acute Goals and Plan of Care (Insert Text)  Description:  FUNCTIONAL STATUS PRIOR TO ADMISSION: Patient was independent and active without use of DME.     HOME SUPPORT PRIOR TO ADMISSION: The patient lived with friend but did not require assist.    Occupational Therapy Goals  Initiated 3/17/2021  1. Patient will perform lower body dressing and bathing with supervision/set-up using AE PRN within 7 day(s). 2.  Patient will perform toilet transfers with modified independence within 7 day(s). 3.  Patient will perform all aspects of toileting with modified independence within 7 day(s). 4.  Patient will participate in upper extremity therapeutic exercise/activities with modified independence for 10 minutes within 7 day(s). 5.  Patient will utilize energy conservation techniques during functional activities with verbal cues within 7 day(s). 6.  Patient will verbalize/demonstrate all back precautions during ADL tasks without cues within 7 days. Outcome: Progressing Towards Goal   OCCUPATIONAL THERAPY EVALUATION  Patient: Kina Barker (59 y.o. female)  Date: 3/17/2021  Primary Diagnosis: Lumbar stenosis with neurogenic claudication [M48.062]  Lumbar spinal stenosis [M48.061]  Procedure(s) (LRB):  L4-S1 LAMINECTOMY/ L4-5 FUSION INSTRUMENTATION/TLIF/BONEGRAFT (N/A) 1 Day Post-Op   Precautions: fall, spinal/back, brace when OOB       ASSESSMENT  Based on the objective data described below, the patient presents with decreased activity tolerance and generalized weakness on POD 1 of spinal surgery. Patient demonstrated good understanding of education provided regarding spinal precautions, back brace application, adaptive ADL techniques including adaptive equipment training, and safe transfer techniques. Patient was received up and required min A for OOB transfers. She was able to transfer into the bathroom for ADL retraining.   Patient engaged in ADLs with good tolerance; She needs increased assistance for LB and standing ADLs. Patient would benefit from continued skilled OT to progress towards goals and improve overall independence. Current Level of Function Impacting Discharge (ADLs/self-care): Patient was received up and required min A for OOB transfers using rolling walker. Patient is independent to supervision level for UB ADLs and required CGA to min A for LB ADLs. Functional Outcome Measure: The patient scored Total: 65/100 on the Barthel Index outcome measure. Patient will benefit from skilled therapy intervention to address the above noted impairments. PLAN :  Recommendations and Planned Interventions: self care training, functional mobility training, therapeutic exercise, therapeutic activities, endurance activities, patient education, home safety training and family training/education    Frequency/Duration: Patient will be followed by occupational therapy 5 times a week to address goals. Recommendation for discharge: (in order for the patient to meet his/her long term goals)  To be determined: likely no follow-up OT services indicated     This discharge recommendation:  Has been made in collaboration with the attending provider and/or case management    IF patient discharges home will need the following DME: none       SUBJECTIVE:   Patient stated I feel pretty good.     OBJECTIVE DATA SUMMARY:   HISTORY:   Past Medical History:   Diagnosis Date    Hypertension     Other and unspecified hyperlipidemia     Pre-diabetes     Unspecified vitamin D deficiency      Past Surgical History:   Procedure Laterality Date    HX HIP REPLACEMENT Right 05/04/2020    HX HYSTERECTOMY  1983       Expanded or extensive additional review of patient history:     Home Situation  Home Environment: Private residence  # Steps to Enter: 4  Rails to Enter: Yes  Hand Rails : Bilateral  One/Two Story Residence: Two story  # of Interior Steps: [id number] [facility] Floor: Right  Lift Chair Available: No  Living Alone: No  Support Systems: Family member(s), Friends \ neighbors, Spouse/Significant Other/Partner  Patient Expects to be Discharged to[de-identified] Private residence  Current DME Used/Available at Home: Cane, quad, [place], straight, Commode, bedside, Grab bars, Shower chair, Walker, rolling  Tub or Shower Type: Shower    Hand dominance: Right    EXAMINATION OF PERFORMANCE DEFICITS:  Cognitive/Behavioral Status:  Neurologic State: Alert  Orientation Level: Oriented X4  Cognition: Decreased attention/concentration; Follows commands  Perception: Appears intact  Perseveration: No perseveration noted  Safety/Judgement: Awareness of environment;Good awareness of safety precautions; Insight into deficits    Skin: Intact in the uppers    Edema: None noted in the uppers    Vision/Perceptual:    Tracking: Able to track stimulus in all quadrants w/o difficulty    Diplopia: No    Acuity: Within Defined Limits       Range of Motion:  AROM: Within functional limits  PROM: Within functional limits    Strength:  Strength: Within functional limits    Coordination:  Fine Motor Skills-Upper: Left Intact; Right Intact    Gross Motor Skills-Upper: Left Intact; Right Intact    Tone & Sensation:  Tone: Normal  Sensation: Intact     Balance:  Sitting: Intact  Standing: Intact; With support    Functional Mobility and Transfers for ADLs:  Bed Mobility:  Rolling: (pt was received up and remained up)  Supine to Sit: Minimum assistance; Additional time;Assist x2;Bed Modified  Sit to Supine: (up to chair)  Scooting: Supervision    Transfers:  Sit to Stand: Minimum assistance  Stand to Sit: Minimum assistance  Bed to Chair: Minimum assistance  Bathroom Mobility: Minimum assistance  Toilet Transfer : Minimum assistance    ADL Assessment:  Feeding: Independent    Oral Facial Hygiene/Grooming: Independent    Bathing: Minimum assistance    Upper Body Dressing: Supervision    Lower Body Dressing: Minimum assistance(using AE )    Toileting: Contact guard assistance        Cognitive Retraining  Safety/Judgement: Awareness of environment;Good awareness of safety precautions; Insight into deficits    Functional Measure:  Barthel Index:    Bathin  Bladder: 10  Bowels: 10  Groomin  Dressin  Feeding: 10  Mobility: 10  Stairs: 0  Toilet Use: 5  Transfer (Bed to Chair and Back): 10  Total: 65/100        The Barthel ADL Index: Guidelines  1. The index should be used as a record of what a patient does, not as a record of what a patient could do. 2. The main aim is to establish degree of independence from any help, physical or verbal, however minor and for whatever reason. 3. The need for supervision renders the patient not independent. 4. A patient's performance should be established using the best available evidence. Asking the patient, friends/relatives and nurses are the usual sources, but direct observation and common sense are also important. However direct testing is not needed. 5. Usually the patient's performance over the preceding 24-48 hours is important, but occasionally longer periods will be relevant. 6. Middle categories imply that the patient supplies over 50 per cent of the effort. 7. Use of aids to be independent is allowed. Fan Lee., Barthel, DKandyW. ([id number]). Functional evaluation: the Barthel Index. [facility] (14)2. SHANAE Graff, Keely Lucas., Nixon Miranda., [place], [address] [place] [address] (). Measuring the change indisability after inpatient rehabilitation; comparison of the responsiveness of the Barthel Index and Functional [place] Measure. Journal of Neurology, Neurosurgery, and Psychiatry, 66(4), 022-434. Alyx Araujo, N.J.A, ANNELIESE Calles, & Roberto Freire, M.A. (2004.) Assessment of post-stroke quality of life in cost-effectiveness studies: The usefulness of the Barthel Index and the EuroQoL-5D.  Quality of Life Research, 13, 437-29         Occupational Therapy Evaluation Charge Determination   History Examination Decision-Making   LOW Complexity : Brief history review  LOW Complexity : 1-3 performance deficits relating to physical, cognitive , or psychosocial skils that result in activity limitations and / or participation restrictions  LOW Complexity : No comorbidities that affect functional and no verbal or physical assistance needed to complete eval tasks       Based on the above components, the patient evaluation is determined to be of the following complexity level: LOW     Activity Tolerance:   Good    After treatment patient left in no apparent distress:    Sitting in chair and Call bell within reach    COMMUNICATION/EDUCATION:   The patients plan of care was discussed with: Physical therapist, Registered nurse and patient. .     Home safety education was provided and the patient/caregiver indicated understanding., Patient/family have participated as able in goal setting and plan of care. and Patient/family agree to work toward stated goals and plan of care. This patients plan of care is appropriate for delegation to [facility].     Thank you for this referral.  Yaz Jansen OTR/L  Time Calculation: 54 mins

## 2021-03-17 NOTE — PROGRESS NOTES
Problem: Mobility Impaired (Adult and Pediatric)  Goal: *Acute Goals and Plan of Care (Insert Text)  Description: FUNCTIONAL STATUS PRIOR TO ADMISSION: Patient was independent and active without use of DME. Her RLE had worsening radicular pain especially of her knee. HOME SUPPORT PRIOR TO ADMISSION: The patient lived with friend but did not require assist.    Physical Therapy Goals  Initiated 3/17/2021    1. Patient will move from supine to sit and sit to supine , scoot up and down, and roll side to side in bed with modified independence within 4 days. 2. Patient will perform sit to stand with modified independence within 4 days. 3. Patient will ambulate with modified independence for 150 feet with the least restrictive device within 4 days. 4. Patient will ascend/descend 13 stairs with 1 handrail(s) with modified independence within 4 days. 5. Patient will verbalize and demonstrate understanding of spinal precautions (No bending, lifting greater than 5 lbs, or twisting; log-roll technique; frequent repositioning as instructed) within 4 days. Note:   PHYSICAL THERAPY EVALUATION  Patient: Fredo Topete (89 y.o. female)  Date: 3/17/2021  Primary Diagnosis: Lumbar stenosis with neurogenic claudication [M48.062]  Procedure(s) (LRB):  L4-S1 LAMINECTOMY/ L4-5 FUSION INSTRUMENTATION/TLIF/BONEGRAFT (N/A) 1 Day Post-Op   Precautions: Spine , fall      ASSESSMENT  Based on the objective data described below, the patient presents with RLE pain, intact sensation BLEs, decreased spine ROM, decreased BLE strength, and decreased standing balance when unsupported in static and dynamic standing. Patient lives with a friend in 2 story home with [facility] and owns SPC and RW. She has a LSO brace here. Recommend HHPT follow up if Dr Debbi Barcenas agreed. Current Level of Function Impacting Discharge (mobility/balance): Educated patient on spine precautions, use of LSO brace, use of ice, and fall prevention.  Patient drowsy but vocalized good repeat back of all information provided. Patient mobilized well initial time out of bed using RW and LSO brace, Min A overall      Functional Outcome Measure: The patient scored 60/100 on the Barthel Index outcome measure. Other factors to consider for discharge:      Patient will benefit from skilled therapy intervention to address the above noted impairments. PLAN :  Recommendations and Planned Interventions: bed mobility training, transfer training, gait training, therapeutic exercises, edema management/control, patient and family training/education, and therapeutic activities      Frequency/Duration: Patient will be followed by physical therapy:  twice daily to address goals. Recommendation for discharge: (in order for the patient to meet his/her long term goals)  Physical therapy at least 2 days/week in the home     This discharge recommendation:  Has been made in collaboration with the attending provider and/or case management    IF patient discharges home will need the following DME: none         SUBJECTIVE:   Patient stated My R leg is better.     OBJECTIVE DATA SUMMARY:   HISTORY:    Past Medical History:   Diagnosis Date    Hypertension     Other and unspecified hyperlipidemia     Pre-diabetes     Unspecified vitamin D deficiency      Past Surgical History:   Procedure Laterality Date    HX HIP REPLACEMENT Right 05/04/2020    HX HYSTERECTOMY  1983       Personal factors and/or comorbidities impacting plan of care:     Home Situation  Home Environment: Private residence  # Steps to Enter: 4  Rails to Enter: Yes  Hand Rails : Bilateral  One/Two Story Residence: Two story  # of Interior Steps: 15  Interior Rails: Right  Lift Chair Available: No  Living Alone: No  Support Systems: Family member(s), Friends \ neighbors, Spouse/Significant Other/Partner  Patient Expects to be Discharged to[de-identified] Private residence  Current DME Used/Available at Home: [place], quad, [place], binta, Artie, bedside, Grab bars, Shower chair, Walker, rolling  Tub or Shower Type: Shower    EXAMINATION/PRESENTATION/DECISION MAKING:   Critical Behavior:  Neurologic State: Alert  Orientation Level: Oriented X4  Cognition: Decreased attention/concentration, Follows commands  Safety/Judgement: Awareness of environment, Good awareness of safety precautions, Insight into deficits    Range Of Motion:  AROM: Within functional limits           PROM: Within functional limits           Strength:    Strength: Within functional limits                    Tone & Sensation:   Tone: Normal              Sensation: Intact               Coordination:  Coordination: Within functional limits       Functional Mobility:  Bed Mobility:  Rolling: Minimum assistance  Supine to Sit: Minimum assistance; Additional time;Assist x2;Bed Modified  Sit to Supine: (up to chair)  Scooting: Supervision  Transfers:  Sit to Stand: Minimum assistance  Stand to Sit: Minimum assistance  Stand Pivot Transfers: Minimum assistance     Bed to Chair: Minimum assistance              Balance:   Sitting: Intact; High guard; Without support  Standing: Intact; With support  Ambulation/Gait Training:  Distance (ft): 40 Feet (ft)  Assistive Device: Brace/Splint;Gait belt;Walker, rolling  Ambulation - Level of Assistance: Contact guard assistance        Gait Abnormalities: Antalgic;Decreased step clearance        Base of Support: Widened     Speed/Radha: Slow  Step Length: Left shortened;Right shortened          Stairs - Level of Assistance: (NT)        Functional Measure:         Physical Therapy Evaluation Charge Determination   History Examination Presentation Decision-Making   LOW Complexity : Zero comorbidities / personal factors that will impact the outcome / POC LOW Complexity : 1-2 Standardized tests and measures addressing body structure, function, activity limitation and / or participation in recreation  LOW Complexity : Stable, uncomplicated  Other outcome measures Barthel Index  LOW       Based on the above components, the patient evaluation is determined to be of the following complexity level: LOW     Pain Ratin/10 with movement, repositioned after activity    Activity Tolerance:   Good    After treatment patient left in no apparent distress:   Sitting in chair and Call bell within reach    COMMUNICATION/EDUCATION:   The patients plan of care was discussed with: Occupational therapist and Registered nurse. Fall prevention education was provided and the patient/caregiver indicated understanding. and Patient/family have participated as able in goal setting and plan of care.     Thank you for this referral.  Grace Patterson, PT, DPT   Time Calculation: 29 mins

## 2021-03-17 NOTE — PROGRESS NOTES
Problem: Mobility Impaired (Adult and Pediatric)  Goal: *Acute Goals and Plan of Care (Insert Text)  Description: FUNCTIONAL STATUS PRIOR TO ADMISSION: Patient was independent and active without use of DME. Her RLE had worsening radicular pain especially of her knee. HOME SUPPORT PRIOR TO ADMISSION: The patient lived with friend but did not require assist.    Physical Therapy Goals  Initiated 3/17/2021    1. Patient will move from supine to sit and sit to supine , scoot up and down, and roll side to side in bed with modified independence within 4 days. 2. Patient will perform sit to stand with modified independence within 4 days. 3. Patient will ambulate with modified independence for 150 feet with the least restrictive device within 4 days. 4. Patient will ascend/descend 13 stairs with 1 handrail(s) with modified independence within 4 days. 5. Patient will verbalize and demonstrate understanding of spinal precautions (No bending, lifting greater than 5 lbs, or twisting; log-roll technique; frequent repositioning as instructed) within 4 days. 3/17/2021 1733 by Jason Gaspar, PT, DPT  Note:   PHYSICAL THERAPY TREATMENT  Patient: Karen Mohamud (80 y.o. female)  Date: 3/17/2021  Diagnosis: Lumbar stenosis with neurogenic claudication [M48.062]  Lumbar spinal stenosis [M48.061] <principal problem not specified>  Procedure(s) (LRB):  L4-S1 LAMINECTOMY/ L4-5 FUSION INSTRUMENTATION/TLIF/BONEGRAFT (N/A) 1 Day Post-Op  Precautions: Back, Fall No bending, no lifting greater than 5 lbs, no twisting, log-roll technique, repositioning every 20-30 min except when sleeping, brace when OOB (if ordered)  Chart, physical therapy assessment, plan of care and goals were reviewed. ASSESSMENT  Patient continues with skilled PT services and is progressing towards goals. Improved log rolling and sidelying to sit edge of bed with CG A. Sit<>stand CG A. Amb 70' x 2 with CG A, step through gait. Pain controlled 4/10. Up to chair for dinner, NAD. Aware of precautions. Educated possibility of more pain tomorrow now that w/o PCA. .     Current Level of Function Impacting Discharge (mobility/balance): CG A for all mobility using RW    Other factors to consider for discharge: flight of stairs         PLAN :  Patient continues to benefit from skilled intervention to address the above impairments. Continue treatment per established plan of care. to address goals. Recommendation for discharge: (in order for the patient to meet his/her long term goals)  Physical therapy at least 2 days/week in the home     This discharge recommendation:  Has not yet been discussed the attending provider and/or case management    IF patient discharges home will need the following DME: rolling walker       SUBJECTIVE:   Patient stated my R leg feels okay.     OBJECTIVE DATA SUMMARY:   Critical Behavior:  Neurologic State: Alert  Orientation Level: Oriented X4  Cognition: Appropriate decision making, Appropriate for age attention/concentration, Appropriate safety awareness  Safety/Judgement: Awareness of environment    Spinal diagnosis intervention:  The patient stated 3/3 back precautions when prompted. Reviewed all 3 back precautions, log roll technique, and sitting for 30 minutes at a time. The patient required min cues to maintain back precautions during functional activity. Reviewed back brace application and wear schedule. Brace donned with minimal assistance/contact guard assist      Functional Mobility Training:    Bed Mobility:  Log Rolling: Contact guard assistance  Supine to Sit: Contact guard assistance  Sit to Supine: (up to chair)  Scooting: Supervision        Transfers:  Sit to Stand: Contact guard assistance  Stand to Sit: Contact guard assistance  Stand Pivot Transfers: Contact guard assistance     Bed to Chair: Contact guard assistance                    Balance:  Sitting: Intact  Standing: Intact; With support  Ambulation/Gait Training:  Distance (ft): 140 Feet (ft)  Assistive Device: Brace/Splint;Gait belt;Walker, rolling  Ambulation - Level of Assistance: Contact guard assistance        Gait Abnormalities: Antalgic        Base of Support: Widened     Speed/Radha: Slow  Step Length: Left shortened;Right shortened                    Stairs:     Stairs - Level of Assistance: (NT)    Pain Ratin/10    Activity Tolerance:   Good    After treatment patient left in no apparent distress:   Sitting in chair and Call bell within reach    COMMUNICATION/COLLABORATION:   The patients plan of care was discussed with: Registered nurse. Josue Castro, PT, DPT   Time Calculation: 23 mins         3/17/2021 1254 by Merlinda Pollen, PT, DPT  Note:   PHYSICAL THERAPY EVALUATION  Patient: Tenisha Donis (5 y.o. female)  Date: 3/17/2021  Primary Diagnosis: Lumbar stenosis with neurogenic claudication [M48.062]  Procedure(s) (LRB):  L4-S1 LAMINECTOMY/ L4-5 FUSION INSTRUMENTATION/TLIF/BONEGRAFT (N/A) 1 Day Post-Op   Precautions: Spine , fall      ASSESSMENT  Based on the objective data described below, the patient presents with RLE pain, intact sensation BLEs, decreased spine ROM, decreased BLE strength, and decreased standing balance when unsupported in static and dynamic standing. Patient lives with a friend in 2 story home with [facility] and owns SPC and RW. She has a LSO brace here. Recommend HHPT follow up if Dr Cynthia Álvarez agreed. Current Level of Function Impacting Discharge (mobility/balance): Educated patient on spine precautions, use of LSO brace, use of ice, and fall prevention. Patient drowsy but vocalized good repeat back of all information provided. Patient mobilized well initial time out of bed using RW and LSO brace, Min A overall      Functional Outcome Measure: The patient scored 60/100 on the Barthel Index outcome measure.       Other factors to consider for discharge:      Patient will benefit from skilled therapy intervention to address the above noted impairments. PLAN :  Recommendations and Planned Interventions: bed mobility training, transfer training, gait training, therapeutic exercises, edema management/control, patient and family training/education, and therapeutic activities      Frequency/Duration: Patient will be followed by physical therapy:  twice daily to address goals. Recommendation for discharge: (in order for the patient to meet his/her long term goals)  Physical therapy at least 2 days/week in the home     This discharge recommendation:  Has been made in collaboration with the attending provider and/or case management    IF patient discharges home will need the following DME: none         SUBJECTIVE:   Patient stated My R leg is better.     OBJECTIVE DATA SUMMARY:   HISTORY:    Past Medical History:   Diagnosis Date    Hypertension     Other and unspecified hyperlipidemia     Pre-diabetes     Unspecified vitamin D deficiency      Past Surgical History:   Procedure Laterality Date    HX HIP REPLACEMENT Right 05/04/2020    HX HYSTERECTOMY  1983       Personal factors and/or comorbidities impacting plan of care:     Home Situation  Home Environment: Private residence  # Steps to Enter: 4  Rails to Enter: Yes  Hand Rails : Bilateral  One/Two Story Residence: Two story  # of Interior Steps: 15  Interior Rails: Right  Lift Chair Available: No  Living Alone: No  Support Systems: Family member(s), Friends \ neighbors, Spouse/Significant Other/Partner  Patient Expects to be Discharged to[de-identified] Private residence  Current DME Used/Available at Home: Trudell Organ, quad, Trudell Organ, straight, Commode, bedside, Grab bars, Shower chair, Walker, rolling  Tub or Shower Type: Shower    EXAMINATION/PRESENTATION/DECISION MAKING:   Critical Behavior:  Neurologic State: Alert  Orientation Level: Oriented X4  Cognition: Decreased attention/concentration, Follows commands  Safety/Judgement: Awareness of environment, Good awareness of safety precautions, Insight into deficits    Range Of Motion:  AROM: Within functional limits           PROM: Within functional limits           Strength:    Strength: Within functional limits                    Tone & Sensation:   Tone: Normal              Sensation: Intact               Coordination:  Coordination: Within functional limits       Functional Mobility:  Bed Mobility:  Rolling: Minimum assistance  Supine to Sit: Minimum assistance; Additional time;Assist x2;Bed Modified  Sit to Supine: (up to chair)  Scooting: Supervision  Transfers:  Sit to Stand: Minimum assistance  Stand to Sit: Minimum assistance  Stand Pivot Transfers: Minimum assistance     Bed to Chair: Minimum assistance              Balance:   Sitting: Intact; High guard; Without support  Standing: Intact; With support  Ambulation/Gait Training:  Distance (ft): 40 Feet (ft)  Assistive Device: Brace/Splint;Gait belt;Walker, rolling  Ambulation - Level of Assistance: Contact guard assistance        Gait Abnormalities: Antalgic;Decreased step clearance        Base of Support: Widened     Speed/Radha: Slow  Step Length: Left shortened;Right shortened          Stairs - Level of Assistance: (NT)        Functional Measure:         Physical Therapy Evaluation Charge Determination   History Examination Presentation Decision-Making   LOW Complexity : Zero comorbidities / personal factors that will impact the outcome / POC LOW Complexity : 1-2 Standardized tests and measures addressing body structure, function, activity limitation and / or participation in recreation  LOW Complexity : Stable, uncomplicated  Other outcome measures Barthel Index  LOW       Based on the above components, the patient evaluation is determined to be of the following complexity level: LOW     Pain Ratin/10 with movement, repositioned after activity    Activity Tolerance:   Good    After treatment patient left in no apparent distress:   Sitting in chair and Call bell within reach    COMMUNICATION/EDUCATION:   The patients plan of care was discussed with: Occupational therapist and Registered nurse. Fall prevention education was provided and the patient/caregiver indicated understanding. and Patient/family have participated as able in goal setting and plan of care.     Thank you for this referral.  Donal Reveles, PT, DPT   Time Calculation: 29 mins

## 2021-03-18 LAB
ANION GAP SERPL CALC-SCNC: 5 MMOL/L (ref 5–15)
BUN SERPL-MCNC: 15 MG/DL (ref 6–20)
BUN/CREAT SERPL: 16 (ref 12–20)
CALCIUM SERPL-MCNC: 9.2 MG/DL (ref 8.5–10.1)
CHLORIDE SERPL-SCNC: 102 MMOL/L (ref 97–108)
CO2 SERPL-SCNC: 29 MMOL/L (ref 21–32)
CREAT SERPL-MCNC: 0.95 MG/DL (ref 0.55–1.02)
GLUCOSE BLD STRIP.AUTO-MCNC: 91 MG/DL (ref 65–100)
GLUCOSE BLD STRIP.AUTO-MCNC: 99 MG/DL (ref 65–100)
GLUCOSE SERPL-MCNC: 91 MG/DL (ref 65–100)
HGB BLD-MCNC: 10.4 G/DL (ref 11.5–16)
POTASSIUM SERPL-SCNC: 4 MMOL/L (ref 3.5–5.1)
SERVICE CMNT-IMP: NORMAL
SERVICE CMNT-IMP: NORMAL
SODIUM SERPL-SCNC: 136 MMOL/L (ref 136–145)

## 2021-03-18 PROCEDURE — 80048 BASIC METABOLIC PNL TOTAL CA: CPT

## 2021-03-18 PROCEDURE — 97530 THERAPEUTIC ACTIVITIES: CPT

## 2021-03-18 PROCEDURE — 97116 GAIT TRAINING THERAPY: CPT

## 2021-03-18 PROCEDURE — 74011250637 HC RX REV CODE- 250/637: Performed by: ORTHOPAEDIC SURGERY

## 2021-03-18 PROCEDURE — 36415 COLL VENOUS BLD VENIPUNCTURE: CPT

## 2021-03-18 PROCEDURE — 85018 HEMOGLOBIN: CPT

## 2021-03-18 PROCEDURE — 82962 GLUCOSE BLOOD TEST: CPT

## 2021-03-18 PROCEDURE — 94760 N-INVAS EAR/PLS OXIMETRY 1: CPT

## 2021-03-18 PROCEDURE — 94761 N-INVAS EAR/PLS OXIMETRY MLT: CPT

## 2021-03-18 PROCEDURE — 74011250637 HC RX REV CODE- 250/637: Performed by: PHYSICIAN ASSISTANT

## 2021-03-18 PROCEDURE — 97535 SELF CARE MNGMENT TRAINING: CPT

## 2021-03-18 RX ORDER — HYDROMORPHONE HYDROCHLORIDE 2 MG/1
2 TABLET ORAL
Status: DISCONTINUED | OUTPATIENT
Start: 2021-03-18 | End: 2021-03-19 | Stop reason: HOSPADM

## 2021-03-18 RX ORDER — HYDROMORPHONE HYDROCHLORIDE 2 MG/1
2 TABLET ORAL
Status: DISCONTINUED | OUTPATIENT
Start: 2021-03-18 | End: 2021-03-18

## 2021-03-18 RX ORDER — HYDROMORPHONE HYDROCHLORIDE 2 MG/1
4 TABLET ORAL
Status: DISCONTINUED | OUTPATIENT
Start: 2021-03-18 | End: 2021-03-18

## 2021-03-18 RX ORDER — HYDROMORPHONE HYDROCHLORIDE 2 MG/1
4 TABLET ORAL
Status: DISCONTINUED | OUTPATIENT
Start: 2021-03-18 | End: 2021-03-19 | Stop reason: HOSPADM

## 2021-03-18 RX ADMIN — POLYETHYLENE GLYCOL 3350 17 G: 17 POWDER, FOR SOLUTION ORAL at 10:04

## 2021-03-18 RX ADMIN — FAMOTIDINE 20 MG: 20 TABLET, FILM COATED ORAL at 17:37

## 2021-03-18 RX ADMIN — HYDROMORPHONE HYDROCHLORIDE 4 MG: 2 TABLET ORAL at 18:55

## 2021-03-18 RX ADMIN — DOCUSATE SODIUM 50 MG AND SENNOSIDES 8.6 MG 1 TABLET: 8.6; 5 TABLET, FILM COATED ORAL at 17:37

## 2021-03-18 RX ADMIN — Medication 2000 UNITS: at 10:05

## 2021-03-18 RX ADMIN — Medication 10 ML: at 21:36

## 2021-03-18 RX ADMIN — HYDROMORPHONE HYDROCHLORIDE 4 MG: 2 TABLET ORAL at 14:50

## 2021-03-18 RX ADMIN — DOCUSATE SODIUM 50 MG AND SENNOSIDES 8.6 MG 1 TABLET: 8.6; 5 TABLET, FILM COATED ORAL at 10:05

## 2021-03-18 RX ADMIN — HYDROMORPHONE HYDROCHLORIDE 2 MG: 2 TABLET ORAL at 07:03

## 2021-03-18 RX ADMIN — Medication 10 ML: at 14:50

## 2021-03-18 RX ADMIN — HYDROMORPHONE HYDROCHLORIDE 4 MG: 2 TABLET ORAL at 10:55

## 2021-03-18 RX ADMIN — HYDROMORPHONE HYDROCHLORIDE 4 MG: 2 TABLET ORAL at 23:49

## 2021-03-18 RX ADMIN — GABAPENTIN 300 MG: 300 CAPSULE ORAL at 10:04

## 2021-03-18 RX ADMIN — GABAPENTIN 300 MG: 300 CAPSULE ORAL at 17:37

## 2021-03-18 RX ADMIN — FAMOTIDINE 20 MG: 20 TABLET, FILM COATED ORAL at 10:05

## 2021-03-18 RX ADMIN — HYDROMORPHONE HYDROCHLORIDE 2 MG: 2 TABLET ORAL at 03:09

## 2021-03-18 NOTE — FACE TO FACE
Rounded on patient, f/u from Spine Pre-op Patient Education Class. Patient states class information was valuable in preparing for surgery. Patient states their home space is prepared and safe. Reviewed incentive spirometry use and mobility precautions. Questions answered.

## 2021-03-18 NOTE — PROGRESS NOTES
3/18/2021  1:53 PM    RUR:  9%  Risk Level: [x]Low []Moderate []High  Value-based purchasing: [x] Yes [] No  Bundle patient: [] Yes [x] No   Specify:     Transition of care plan:  1. Awaiting medical clearance and DC order. 2. Home with [facility] PT/OT  3. Outpatient follow-up.   4. Pt's family to transport    Garfield Lennox, RN

## 2021-03-18 NOTE — PROGRESS NOTES
ORTHOPAEDIC LUMBAR FUSION PROGRESS NOTE    NAME:     Aries Gonzales   :       1950   MRN:       [id number]   DATE:      3/17/2021    POD:    1 Day Post-Op  S/P:    Procedure(s):  L4-S1 LAMINECTOMY/ L4-5 FUSION INSTRUMENTATION/TLIF/BONEGRAFT    SUBJECTIVE:    C/O back pain along surgical incision, preop numbness to R foot is unchanged  No leg pain  Denies nausea/vomiting, headache, chest pain or shortness of breath  Pain controlled    Recent Labs     21  0522   HGB 11.4*      K 4.2      CO2 28   BUN 15   CREA 1.03*   *     Patient Vitals for the past 24 hrs:   BP Temp Pulse Resp SpO2   21 1942 132/74 98.9 °F (37.2 °C) 88 18 94 %   21 1524 128/61 98 °F (36.7 °C) 84 16 94 %   21 1223 (!) 158/76 98.7 °F (37.1 °C) 90 18 97 %   21 0823 111/60 98.6 °F (37 °C) 87 18 90 %   21 0313 (!) 144/83 99.2 °F (37.3 °C) 91 18 96 %   21 2304 (!) 158/79 98.2 °F (36.8 °C) 89 18 97 %   21 2215 (!) 162/84 98.4 °F (36.9 °C) 86 20 96 %       EXAM:  Dressings clean, dry and intact   Positive strength/ROM bilat lower ext.   Neuro intact to sensation  Calves, soft & nontender  BL LEs NVID      PLAN:  D/C PCA, change to PO pain medications  PT/OT, OOB w/ assist  Advance diet as tolerated  Monitor hemovac drain output      Louise Rosales [place]  Orthopaedic Surgery  Physician Assistant to Dr. Bryan Elder

## 2021-03-18 NOTE — PROGRESS NOTES
Problem: Self Care Deficits Care Plan (Adult)  Goal: *Acute Goals and Plan of Care (Insert Text)  Description:  FUNCTIONAL STATUS PRIOR TO ADMISSION: Patient was independent and active without use of DME.     HOME SUPPORT PRIOR TO ADMISSION: The patient lived with friend but did not require assist.    Occupational Therapy Goals  Initiated 3/17/2021  1. Patient will perform lower body dressing and bathing with supervision/set-up using AE PRN within 7 day(s). 2.  Patient will perform toilet transfers with modified independence within 7 day(s). 3.  Patient will perform all aspects of toileting with modified independence within 7 day(s). 4.  Patient will participate in upper extremity therapeutic exercise/activities with modified independence for 10 minutes within 7 day(s). 5.  Patient will utilize energy conservation techniques during functional activities with verbal cues within 7 day(s). 6.  Patient will verbalize/demonstrate all back precautions during ADL tasks without cues within 7 days. OCCUPATIONAL THERAPY TREATMENT  Patient: Kofi Goodson (33 y.o. female)  Date: 3/18/2021  Diagnosis: Lumbar stenosis with neurogenic claudication [M48.062]  Lumbar spinal stenosis [M48.061] <principal problem not specified>  Procedure(s) (LRB):  L4-S1 LAMINECTOMY/ L4-5 FUSION INSTRUMENTATION/TLIF/BONEGRAFT (N/A) 2 Days Post-Op  Precautions: Back, Fall  Chart, occupational therapy assessment, plan of care, and goals were reviewed. ASSESSMENT  Patient continues with skilled OT services and is progressing towards goals. Pt seated in chair and needing to use the restroom. She was able to perform all aspects of task without assist. Pt returned to chair and stated she felt comfortable about using AE for bathing and dressing after training yesterday. Pt clear from an OT standpoint. Current Level of Function Impacting Discharge (ADLs):  Mod I toileting, LB tasks    Other factors to consider for discharge: PLAN :  Patient continues to benefit from skilled intervention to address the above impairments. Continue treatment per established plan of care. to address goals. Recommend with staff: Out of bed to chair for ADl's, there ex, there act, meals    Recommend next OT session: Pt clear from an OT standpoint    Recommendation for discharge: (in order for the patient to meet his/her long term goals)  No skilled occupational therapy/ follow up rehabilitation needs identified at this time. This discharge recommendation:  Has not yet been discussed the attending provider and/or case management    IF patient discharges home will need the following DME:       SUBJECTIVE:   Patient stated I feel good.     OBJECTIVE DATA SUMMARY:   Cognitive/Behavioral Status:  Neurologic State: Alert  Orientation Level: Oriented X4  Cognition: Appropriate decision making; Appropriate for age attention/concentration; Appropriate safety awareness; Follows commands             Functional Mobility and Transfers for ADLs:  Bed Mobility:  Supine to Sit: Minimum assistance    Transfers:  Sit to Stand: Contact guard assistance  Functional Transfers  Toilet Transfer : Contact guard assistance       Balance:  Sitting: Intact  Standing: With support    ADL Intervention:       Grooming  Position Performed: Standing  Washing Hands: Stand-by assistance                        Toileting  Bladder Hygiene: Contact guard assistance  Clothing Management: Contact guard assistance       Activity Tolerance:   Good    After treatment patient left in no apparent distress:   Sitting in chair and Call bell within reach    COMMUNICATION/COLLABORATION:   The patients plan of care was discussed with: Physical therapy assistant and Occupational therapist.     ELEN Adames  Time Calculation: 20 mins

## 2021-03-18 NOTE — PROGRESS NOTES
Problem: Mobility Impaired (Adult and Pediatric)  Goal: *Acute Goals and Plan of Care (Insert Text)  Description: FUNCTIONAL STATUS PRIOR TO ADMISSION: Patient was independent and active without use of DME. Her RLE had worsening radicular pain especially of her knee. HOME SUPPORT PRIOR TO ADMISSION: The patient lived with friend but did not require assist.    Physical Therapy Goals  Initiated 3/17/2021    1. Patient will move from supine to sit and sit to supine , scoot up and down, and roll side to side in bed with modified independence within 4 days. 2. Patient will perform sit to stand with modified independence within 4 days. 3. Patient will ambulate with modified independence for 150 feet with the least restrictive device within 4 days. 4. Patient will ascend/descend 13 stairs with 1 handrail(s) with modified independence within 4 days. 5. Patient will verbalize and demonstrate understanding of spinal precautions (No bending, lifting greater than 5 lbs, or twisting; log-roll technique; frequent repositioning as instructed) within 4 days. Note:   PHYSICAL THERAPY TREATMENT  Patient: Rodolfo Bernal (77 y.o. female)  Date: 3/18/2021  Diagnosis: Lumbar stenosis with neurogenic claudication [M48.062]  Lumbar spinal stenosis [M48.061] <principal problem not specified>  Procedure(s) (LRB):  L4-S1 LAMINECTOMY/ L4-5 FUSION INSTRUMENTATION/TLIF/BONEGRAFT (N/A) 2 Days Post-Op  Precautions: Back, Fall  Chart, physical therapy assessment, plan of care and goals were reviewed. ASSESSMENT  Patient continues with skilled PT services. Pt supine to sit with min assist.Pt donned back brace with min assist.Pt CGA sit to stand. Pt ambulated 100ft with RW CGA. Pt left sitting. Pt progressing with mobility. Continue goals. Current Level of Function Impacting Discharge (mobility/balance): Pt is CGA to min assist for mobility.              PLAN :  Patient continues to benefit from skilled intervention to address the above impairments. Continue treatment per established plan of care. to address goals.     Recommendation for discharge: (in order for the patient to meet his/her long term goals)  Physical therapy at least 2 days/week in the home     This discharge recommendation:  Has been made in collaboration with the attending provider and/or case management    IF patient discharges home will need the following DME: rolling walker       SUBJECTIVE:       OBJECTIVE DATA SUMMARY:   Critical Behavior:  Neurologic State: Alert  Orientation Level: Oriented X4  Cognition: Appropriate decision making, Appropriate for age attention/concentration, Appropriate safety awareness, Follows commands  Safety/Judgement: Awareness of environment  Functional Mobility Training:  Bed Mobility:     Supine to Sit: Minimum assistance              Transfers:  Sit to Stand: Contact guard assistance  Stand to Sit: Contact guard assistance                             Balance:  Sitting: Intact  Standing: With support  Ambulation/Gait Training:  Distance (ft): 100 Feet (ft)  Assistive Device: Gait belt;Walker, rolling  Ambulation - Level of Assistance: Contact guard assistance                 Base of Support: Narrowed     Speed/Radha: Pace decreased (<100 feet/min)  Step Length: Right shortened;Left shortened                  Activity Tolerance:   Good    After treatment patient left in no apparent distress:   Sitting in chair    COMMUNICATION/COLLABORATION:   The patients plan of care was discussed with: Physical therapist.     Ct Harrington PTA   Time Calculation: 23 mins

## 2021-03-18 NOTE — PROGRESS NOTES
ORTHOPAEDIC LUMBAR FUSION PROGRESS NOTE    NAME:     Ariadna Sewell   :       1950   MRN:       [id number]   DATE:      3/18/2021    POD:    2 Days Post-Op  S/P:    Procedure(s):  L4-S1 LAMINECTOMY/ L4-5 FUSION INSTRUMENTATION/TLIF/BONEGRAFT    SUBJECTIVE:    C/O back pain along surgical incision  No leg pain or numbness  Denies nausea/vomiting, headache, chest pain or shortness of breath  Pain controlled    Recent Labs     21  0530   HGB 10.4*      K 4.0      CO2 29   BUN 15   CREA 0.95   GLU 91     Patient Vitals for the past 12 hrs:   BP Temp Pulse Resp SpO2   21 0309 (!) 148/88 98.2 °F (36.8 °C) 92 18 90 %   21 2342 (!) 142/70 98.7 °F (37.1 °C) 88 18 90 %       EXAM:  Dressings clean, dry and intact   Positive strength/ROM bilat lower ext.   Neuro intact to sensation  Calves, soft & nontender  BL LEs NVID      PLAN:  Continue prn PO pain medications- changed to dilaudid for better pain control  PT/OT, OOB w/ assist  Tolerating diet      Jacqueline Bernal [place]  Orthopaedic Surgery  Physician Assistant to Dr. Dameon Sauer

## 2021-03-18 NOTE — PROGRESS NOTES
Problem: Mobility Impaired (Adult and Pediatric)  Goal: *Acute Goals and Plan of Care (Insert Text)  Description: FUNCTIONAL STATUS PRIOR TO ADMISSION: Patient was independent and active without use of DME. Her RLE had worsening radicular pain especially of her knee. HOME SUPPORT PRIOR TO ADMISSION: The patient lived with friend but did not require assist.    Physical Therapy Goals  Initiated 3/17/2021    1. Patient will move from supine to sit and sit to supine , scoot up and down, and roll side to side in bed with modified independence within 4 days. 2. Patient will perform sit to stand with modified independence within 4 days. 3. Patient will ambulate with modified independence for 150 feet with the least restrictive device within 4 days. 4. Patient will ascend/descend 13 stairs with 1 handrail(s) with modified independence within 4 days. 5. Patient will verbalize and demonstrate understanding of spinal precautions (No bending, lifting greater than 5 lbs, or twisting; log-roll technique; frequent repositioning as instructed) within 4 days. 3/18/2021 1549 by Bailey Charlton PTA  Note:   PHYSICAL THERAPY TREATMENT  Patient: Lesia Cuadra (16 y.o. female)  Date: 3/18/2021  Diagnosis: Lumbar stenosis with neurogenic claudication [M48.062]  Lumbar spinal stenosis [M48.061] <principal problem not specified>  Procedure(s) (LRB):  L4-S1 LAMINECTOMY/ L4-5 FUSION INSTRUMENTATION/TLIF/BONEGRAFT (N/A) 2 Days Post-Op  Precautions: Back, Fall  Chart, physical therapy assessment, plan of care and goals were reviewed. ASSESSMENT  Patient continues with skilled PT services. Pt supine to sit with CGA to min assist.Pt donned back brace with min assist.Pt CGA sit  to stand. Pt ambulated 140ft with RW CGA. Pt back to bed with CGA. Pt ready to practice steps in AM.Pt progressing well. Continue goals. Current Level of Function Impacting Discharge (mobility/balance): Pt is CGA to min assist for mobility. PLAN :  Patient continues to benefit from skilled intervention to address the above impairments. Continue treatment per established plan of care. to address goals.     Recommendation for discharge: (in order for the patient to meet his/her long term goals)  Physical therapy at least 2 days/week in the home     This discharge recommendation:  Has been made in collaboration with the attending provider and/or case management    IF patient discharges home will need the following DME: rolling walker       SUBJECTIVE:       OBJECTIVE DATA SUMMARY:   Critical Behavior:  Neurologic State: Alert  Orientation Level: Oriented X4  Cognition: Appropriate decision making, Appropriate for age attention/concentration, Appropriate safety awareness, Follows commands  Safety/Judgement: Awareness of environment  Functional Mobility Training:  Bed Mobility:     Supine to Sit: CGA to Minimum assistance              Transfers:  Sit to Stand: Contact guard assistance  Stand to Sit: Contact guard assistance                             Balance:  Sitting: Intact  Standing: With support  Ambulation/Gait Training:  Distance (ft): 140 Feet (ft)  Assistive Device: Gait belt;Walker, rolling  Ambulation - Level of Assistance: Contact guard assistance                 Base of Support: Narrowed     Speed/Radha: Pace decreased (<100 feet/min)  Step Length: Right shortened;Left shortened                   Activity Tolerance:   Good    After treatment patient left in no apparent distress:   Supine in bed    COMMUNICATION/COLLABORATION:   The patients plan of care was discussed with: Physical therapist.     Luisa Zayas PTA   Time Calculation: 23 mins         3/18/2021 1330 by Florencia Woods PTA  Note:   PHYSICAL THERAPY TREATMENT  Patient: Manuella Goldberg (19 y.o. female)  Date: 3/18/2021  Diagnosis: Lumbar stenosis with neurogenic claudication [M48.062]  Lumbar spinal stenosis [M48.061] <principal problem not specified>  Procedure(s) (LRB):  L4-S1 LAMINECTOMY/ L4-5 FUSION INSTRUMENTATION/TLIF/BONEGRAFT (N/A) 2 Days Post-Op  Precautions: Back, Fall  Chart, physical therapy assessment, plan of care and goals were reviewed. ASSESSMENT  Patient continues with skilled PT services. Pt supine to sit with min assist.Pt donned back brace with min assist.Pt CGA sit to stand. Pt ambulated 100ft with RW CGA. Pt left sitting. Pt progressing with mobility. Continue goals. Current Level of Function Impacting Discharge (mobility/balance): Pt is CGA to min assist for mobility. PLAN :  Patient continues to benefit from skilled intervention to address the above impairments. Continue treatment per established plan of care. to address goals.     Recommendation for discharge: (in order for the patient to meet his/her long term goals)  Physical therapy at least 2 days/week in the home     This discharge recommendation:  Has been made in collaboration with the attending provider and/or case management    IF patient discharges home will need the following DME: rolling walker       SUBJECTIVE:       OBJECTIVE DATA SUMMARY:   Critical Behavior:  Neurologic State: Alert  Orientation Level: Oriented X4  Cognition: Appropriate decision making, Appropriate for age attention/concentration, Appropriate safety awareness, Follows commands  Safety/Judgement: Awareness of environment  Functional Mobility Training:  Bed Mobility:     Supine to Sit: Minimum assistance              Transfers:  Sit to Stand: Contact guard assistance  Stand to Sit: Contact guard assistance                             Balance:  Sitting: Intact  Standing: With support  Ambulation/Gait Training:  Distance (ft): 100 Feet (ft)  Assistive Device: Gait belt;Walker, rolling  Ambulation - Level of Assistance: Contact guard assistance                 Base of Support: Narrowed     Speed/Radha: Pace decreased (<100 feet/min)  Step Length: Right shortened;Left shortened                  Activity Tolerance:   Good    After treatment patient left in no apparent distress:   Sitting in chair    COMMUNICATION/COLLABORATION:   The patients plan of care was discussed with: Physical therapist.     Paco Kapoor PTA   Time Calculation: 23 mins

## 2021-03-19 ENCOUNTER — APPOINTMENT (OUTPATIENT)
Dept: CT IMAGING | Age: 71
End: 2021-03-19
Attending: PHYSICIAN ASSISTANT
Payer: MEDICARE

## 2021-03-19 ENCOUNTER — APPOINTMENT (OUTPATIENT)
Dept: GENERAL RADIOLOGY | Age: 71
End: 2021-03-19
Attending: NURSE PRACTITIONER
Payer: MEDICARE

## 2021-03-19 VITALS
RESPIRATION RATE: 18 BRPM | OXYGEN SATURATION: 97 % | DIASTOLIC BLOOD PRESSURE: 76 MMHG | HEART RATE: 75 BPM | BODY MASS INDEX: 36.25 KG/M2 | HEIGHT: 65 IN | SYSTOLIC BLOOD PRESSURE: 151 MMHG | WEIGHT: 217.59 LBS | TEMPERATURE: 98.6 F

## 2021-03-19 LAB
ANION GAP SERPL CALC-SCNC: 6 MMOL/L (ref 5–15)
BUN SERPL-MCNC: 16 MG/DL (ref 6–20)
BUN/CREAT SERPL: 20 (ref 12–20)
CALCIUM SERPL-MCNC: 9.6 MG/DL (ref 8.5–10.1)
CHLORIDE SERPL-SCNC: 101 MMOL/L (ref 97–108)
CO2 SERPL-SCNC: 29 MMOL/L (ref 21–32)
COMMENT, HOLDF: NORMAL
CREAT SERPL-MCNC: 0.82 MG/DL (ref 0.55–1.02)
GLUCOSE SERPL-MCNC: 99 MG/DL (ref 65–100)
HGB BLD-MCNC: 10.4 G/DL (ref 11.5–16)
POTASSIUM SERPL-SCNC: 3.8 MMOL/L (ref 3.5–5.1)
SAMPLES BEING HELD,HOLD: NORMAL
SODIUM SERPL-SCNC: 136 MMOL/L (ref 136–145)

## 2021-03-19 PROCEDURE — 97530 THERAPEUTIC ACTIVITIES: CPT

## 2021-03-19 PROCEDURE — 72131 CT LUMBAR SPINE W/O DYE: CPT

## 2021-03-19 PROCEDURE — 36415 COLL VENOUS BLD VENIPUNCTURE: CPT

## 2021-03-19 PROCEDURE — 94760 N-INVAS EAR/PLS OXIMETRY 1: CPT

## 2021-03-19 PROCEDURE — 72100 X-RAY EXAM L-S SPINE 2/3 VWS: CPT

## 2021-03-19 PROCEDURE — 85018 HEMOGLOBIN: CPT

## 2021-03-19 PROCEDURE — 74011250637 HC RX REV CODE- 250/637: Performed by: ORTHOPAEDIC SURGERY

## 2021-03-19 PROCEDURE — 97535 SELF CARE MNGMENT TRAINING: CPT

## 2021-03-19 PROCEDURE — 74011250637 HC RX REV CODE- 250/637: Performed by: PHYSICIAN ASSISTANT

## 2021-03-19 PROCEDURE — 97116 GAIT TRAINING THERAPY: CPT

## 2021-03-19 PROCEDURE — 80048 BASIC METABOLIC PNL TOTAL CA: CPT

## 2021-03-19 RX ORDER — HYDROMORPHONE HYDROCHLORIDE 2 MG/1
2-4 TABLET ORAL
Qty: 50 TAB | Refills: 0 | Status: SHIPPED | OUTPATIENT
Start: 2021-03-19 | End: 2021-03-25 | Stop reason: SDUPTHER

## 2021-03-19 RX ORDER — NALOXONE HYDROCHLORIDE 4 MG/.1ML
SPRAY NASAL
Qty: 1 EACH | Refills: 0 | Status: SHIPPED | OUTPATIENT
Start: 2021-03-19 | End: 2022-06-01

## 2021-03-19 RX ORDER — AMOXICILLIN 250 MG
1 CAPSULE ORAL 2 TIMES DAILY
Qty: 60 TAB | Refills: 0 | Status: SHIPPED | OUTPATIENT
Start: 2021-03-19

## 2021-03-19 RX ADMIN — HYDROMORPHONE HYDROCHLORIDE 4 MG: 2 TABLET ORAL at 17:51

## 2021-03-19 RX ADMIN — HYDROMORPHONE HYDROCHLORIDE 4 MG: 2 TABLET ORAL at 13:29

## 2021-03-19 RX ADMIN — HYDROMORPHONE HYDROCHLORIDE 4 MG: 2 TABLET ORAL at 09:34

## 2021-03-19 RX ADMIN — TRIAMTERENE AND HYDROCHLOROTHIAZIDE 1 TABLET: 37.5; 25 TABLET ORAL at 09:34

## 2021-03-19 RX ADMIN — Medication 10 ML: at 05:18

## 2021-03-19 RX ADMIN — HYDROMORPHONE HYDROCHLORIDE 4 MG: 2 TABLET ORAL at 05:17

## 2021-03-19 NOTE — PROGRESS NOTES
Problem: Self Care Deficits Care Plan (Adult)  Goal: *Acute Goals and Plan of Care (Insert Text)  Description:  FUNCTIONAL STATUS PRIOR TO ADMISSION: Patient was independent and active without use of DME.     HOME SUPPORT PRIOR TO ADMISSION: The patient lived with friend but did not require assist.    Occupational Therapy Goals  Initiated 3/17/2021  1. Patient will perform lower body dressing and bathing with supervision/set-up using AE PRN within 7 day(s). 2.  Patient will perform toilet transfers with modified independence within 7 day(s). 3.  Patient will perform all aspects of toileting with modified independence within 7 day(s). 4.  Patient will participate in upper extremity therapeutic exercise/activities with modified independence for 10 minutes within 7 day(s). 5.  Patient will utilize energy conservation techniques during functional activities with verbal cues within 7 day(s). 6.  Patient will verbalize/demonstrate all back precautions during ADL tasks without cues within 7 days. Outcome: Progressing Towards Goal   OCCUPATIONAL THERAPY TREATMENT  Patient: Odilon Mcmahan (34 y.o. female)  Date: 3/19/2021  Diagnosis: Lumbar stenosis with neurogenic claudication [M48.062]  Lumbar spinal stenosis [M48.061] <principal problem not specified>  Procedure(s) (LRB):  L4-S1 LAMINECTOMY/ L4-5 FUSION INSTRUMENTATION/TLIF/BONEGRAFT (N/A) 3 Days Post-Op  Precautions: Back, Fall  Chart, occupational therapy assessment, plan of care, and goals were reviewed. ASSESSMENT  Patient continues with skilled OT services and is progressing towards goals. Pt cleared to be seen by RN as surgery was cancelled. . Pt able to dress herself with use of reacher for LB dressing tasks seated in chair. She stated she has a reacher at home and thinks she may have a hip kit as well. Good adherence to spinal precautions. Pt clear from an OT standpoint to return home.     Current Level of Function Impacting Discharge (ADLs): Mod I LB dressing, UB dressing    Other factors to consider for discharge:          PLAN :  Patient continues to benefit from skilled intervention to address the above impairments. Continue treatment per established plan of care. to address goals. Recommend with staff: Out of bed to chair for ADl's, meals, there act    Recommend next OT session: Pt clear from an OT standpoint    Recommendation for discharge: (in order for the patient to meet his/her long term goals)  No skilled occupational therapy/ follow up rehabilitation needs identified at this time. This discharge recommendation:  Has not yet been discussed the attending provider and/or case management    IF patient discharges home will need the following DME:        SUBJECTIVE:   Patient stated I am ready.     OBJECTIVE DATA SUMMARY:   Cognitive/Behavioral Status:  Neurologic State: Alert  Orientation Level: Oriented X4  Cognition: Appropriate decision making             Functional Mobility and Transfers for ADLs:  Bed Mobility:   Not tested as pt up in chair    Transfers:  Sit to Stand: Contact guard assistance          Balance:  Sitting: Intact  Standing: With support    ADL Intervention:       Upper Body Dressing Assistance  Dressing Assistance: Independent  Orthotics(Brace): Set-up  Pullover Shirt: Independent  Front Opened Shirt: Independent    Lower Body Dressing Assistance  Dressing Assistance: Modified independent  Pants With Elastic Waist: Modified independent  Leg Crossed Method Used: No  Position Performed: Seated in chair  Adaptive Equipment Used: Reacher         Activity Tolerance:   Good    After treatment patient left in no apparent distress:   Sitting in chair    COMMUNICATION/COLLABORATION:   The patients plan of care was discussed with: Physical therapy assistant, Occupational therapist, and Registered nurse.      ELEN Chou  Time Calculation: 19 mins

## 2021-03-19 NOTE — DISCHARGE INSTRUCTIONS
Lumbar Spinal Surgery Discharge Instructions   Dr. Raj Mott  [phone]    Activity:    [facility] You are going home a well person, be as active as possible. Your only exercise should be walking. Start with short frequent walks and increase your walking distance each day. Start with walking twice a day for 5 minutes and increase your distance each day 2-3 minutes until you reach 25 minutes twice a day. Limit the amount of time you sit to 20-30 minute intervals. Sitting for prolonged periods of time will be uncomfortable for you following your surgery.  No bending, lifting (of 5lbs or more), twisting, or straining.  Do not drive until your doctor states you may do so. However, you may ride in a car for short distances.  If you are required to wear a brace, you should wear it at all times when you are out of bed. You may remove it when sleeping unless your physician advises you against it.  When you are in the bed, you may lay on your back or on either side. Do not lie on your stomach.  You may resume sexual relations 3-4 weeks after surgery depending on how you are feeling.  Continue to use your incentive spirometer for deep breathing exercises. Driving:   You may not drive or return to work until instructed by your physician. However, you may ride in the car for short periods of time. Brace:   If you have a back brace, you should wear your brace at all times when you are out of bed. Do not wear the brace while in bed or showering.  Remember to always wear a cotton t-shirt underneath your brace.  Do not bend or twist when your brace is off. Diet:   You may resume your regular home diet as tolerated. If your throat is sore, you should eat soft foods for the first couple of days.  Be sure to drink plenty of fluids; it is important to keep yourself hydrated.  Avoid alcoholic beverages and ABSOLUTELY NO tobacco products.  Tobacco products will interfere with your healing. If you continue to use tobacco, you may end up needing another surgery in the future. Dressing: You have on a Prineo dressing. This is a waterproof bacteriostatic dressing that  requires no post-operative care. Please do not peel the dressing off, or apply any  oil based products, as they may expedite the deterioration of the mesh. The  dressing will slowly chip off on its own.  Do not rub or apply lotion or ointments to incision site. Shower:   You may shower 5 days after your surgery.  You may remove your brace during showers.  NO not use tub baths, swimming pools or Jacuzzis. Medications:   Check with your physician before taking any anti-inflammatory medications. (Advil, Aleve, Ibuprofen, Aspirin)   Take your pain medication as directed   Do NOT take additional Tylenol if your prescribed pain medication has acetaminophen in it (Endocet/Percocet, Lortab, Norco)   It is important to have regular bowel movements. Pain medications can be constipating. Stool softeners, warm prune juice, increasing your water intake, and increasing fiber in your diet can help in preventing constipation.  Do NOT take laxatives if at all possible except in severe situations. It can result in a vicious cycle of constipation and diarrhea. Follow-Up    Please call ASAP to schedule your 1st post-operative appointment. This should be approximately 3 weeks from your surgery date. Notify your physician in you develop any of the following conditions:   Fever above 101 degrees for 24 hours.  Nausea or vomiting.  Severe headache.  Inability to urinate   Loss of bowel or bladder function (sudden onset of incontinence)   Changes in sensation in your extremities (numbness, tingling, loss of color).  Severe pain or pain not relieved by medications.  Redness, swelling, or drainage from your incision.    Persistent pain in the chest.    Pain in the calf of either leg.   Increased weakness (if this is greater than before your surgery). If you have any questions about your dressing contact your Orthopaedic Surgeons office. OFFICE OF DR. Jones Lucas   [phone]  [place] [id number] [address] [place] [address] [address] [facility]     ** IMPORTANT: UNDER YOUR OPTIFOAM DRESSING, YOU HAVE AN ADHESIVE MESH DIRECTLY OVER YOUR INCISION. THIS MESH WAS STERILELY GLUED TO YOUR SKIN DURING SURGERY. DO NOT REMOVE THIS. NO ONE ELSE SHOULD REMOVE IT EITHER. IT WILL COME OFF ON ITS OWN OVER TIME    YOUR OPTIFOAM DRESSING SHOULD REMAIN IN PLACE FOR 1 WEEK. IT IS A WATERPROOF DRESSING AS LONG AS IT'S PLACEMENT IS INTACT. YOU CAN SHOWER AS INDICATED BELOW IN YOUR DISCHARGE INSTRUCTIONS    * WEAR YOUR BRACE AS ADVISED    * NO DRIVING UNTIL YOU ARE CLEARED TO DO SO BY YOUR SURGEON    * LIMIT LIFTING, BENDING AND TWISTING.  NO LIFTING MORE THAN 5 LBS    * MAKE SURE YOU ARE GETTING GOOD NUTRITION (Lean Protein, Vitamin D AND Calcium)    * DO NOT TAKE ANY NSAIDs FOR THE FIRST 3 MONTHS AFTER SURGERY (such as Advil/Ibuprofen/Motrin, Aleve/Naproxen/Naprosyn, Diclofenac, Celebrex, Meloxicam, Indomethacin, Goody's powder, BC powder etc.)    * NO NICOTINE PRODUCTS    * FULLY READ YOUR DISCHARGE INSTRUCTIONS

## 2021-03-19 NOTE — PROGRESS NOTES
Problem: Falls - Risk of  Goal: *Absence of Falls  Description: Document Will Sims Fall Risk and appropriate interventions in the flowsheet.   Outcome: Progressing Towards Goal  Note: Fall Risk Interventions:  Mobility Interventions: Patient to call before getting OOB         Medication Interventions: Patient to call before getting OOB    Elimination Interventions: Call light in reach

## 2021-03-19 NOTE — PROGRESS NOTES
Problem: Mobility Impaired (Adult and Pediatric)  Goal: *Acute Goals and Plan of Care (Insert Text)  Description: FUNCTIONAL STATUS PRIOR TO ADMISSION: Patient was independent and active without use of DME. Her RLE had worsening radicular pain especially of her knee. HOME SUPPORT PRIOR TO ADMISSION: The patient lived with friend but did not require assist.    Physical Therapy Goals  Initiated 3/17/2021    1. Patient will move from supine to sit and sit to supine , scoot up and down, and roll side to side in bed with modified independence within 4 days. 2. Patient will perform sit to stand with modified independence within 4 days. 3. Patient will ambulate with modified independence for 150 feet with the least restrictive device within 4 days. 4. Patient will ascend/descend 13 stairs with 1 handrail(s) with modified independence within 4 days. 5. Patient will verbalize and demonstrate understanding of spinal precautions (No bending, lifting greater than 5 lbs, or twisting; log-roll technique; frequent repositioning as instructed) within 4 days. Note:   PHYSICAL THERAPY TREATMENT  Patient: Laura Sheldon (64 y.o. female)  Date: 3/19/2021  Diagnosis: Lumbar stenosis with neurogenic claudication [M48.062]  Lumbar spinal stenosis [M48.061] <principal problem not specified>  Procedure(s) (LRB):  L4-S1 LAMINECTOMY/ L4-5 FUSION INSTRUMENTATION/TLIF/BONEGRAFT (N/A) 3 Days Post-Op  Precautions: Back, Fall  Chart, physical therapy assessment, plan of care and goals were reviewed. ASSESSMENT  Patient continues with skilled PT services. Pt donned back brace with min assist and cues. Pt sit to stand with CGA. Pt ambulated 70ft with RW CGA. Pt up and down 8 steps with rails CGA. Pt left sitting. Pt is progressing well at [address]  level for mobility. Pt is cleared for D/C by PT. Current Level of Function Impacting Discharge (mobility/balance): Pt is CGA for mobility.              PLAN :  Patient continues to benefit from skilled intervention to address the above impairments. Continue treatment per established plan of care. to address goals.     Recommendation for discharge: (in order for the patient to meet his/her long term goals)  Physical therapy at least 2 days/week in the home     This discharge recommendation:  Has been made in collaboration with the attending provider and/or case management    IF patient discharges home will need the following DME: rolling walker       SUBJECTIVE:       OBJECTIVE DATA SUMMARY:   Critical Behavior:  Neurologic State: Alert  Orientation Level: Oriented X4  Cognition: Appropriate decision making  Safety/Judgement: Awareness of environment  Functional Mobility Training:  Bed Mobility:       Transfers:  Sit to Stand: Contact guard assistance  Stand to Sit: Contact guard assistance                             Balance:  Sitting: Intact  Standing: With support  Ambulation/Gait Training:  Distance (ft): 70 Feet (ft)  Assistive Device: Gait belt;Walker, rolling  Ambulation - Level of Assistance: Contact guard assistance        Gait Abnormalities: Decreased step clearance        Base of Support: Narrowed     Speed/Radha: Pace decreased (<100 feet/min)  Step Length: Right shortened;Left shortened                Stairs:     Stairs - Level of Assistance: Contact guard assistance            Activity Tolerance:   Good    After treatment patient left in no apparent distress:   Sitting in chair    COMMUNICATION/COLLABORATION:   The patients plan of care was discussed with: Physical therapist.     Vinnie Mijares, PTA   Time Calculation: 23 mins

## 2021-03-19 NOTE — PROGRESS NOTES
Spoke to [employer] [place]. Patient does not want to go back to OR to obtain the drain fragment. She wants to go home. Ortho NP notified. Discharge information is already entered. Will review and finish discharge.

## 2021-03-19 NOTE — PROGRESS NOTES
ORTHOPAEDIC LUMBAR FUSION PROGRESS NOTE    NAME:     Mira Marmolejo   :       1950   MRN:       [id number]   DATE:      3/19/2021    POD:    3 Days Post-Op  S/P:    Procedure(s):  L4-S1 LAMINECTOMY/ L4-5 FUSION INSTRUMENTATION/TLIF/BONEGRAFT    SUBJECTIVE:    C/O back pain along surgical incision  No leg pain or numbness  Denies nausea/vomiting, headache, chest pain or shortness of breath  Pain controlled    Recent Labs     21  0632   HGB 10.4*      K 3.8      CO2 29   BUN 16   CREA 0.82   GLU 99     Patient Vitals for the past 12 hrs:   BP Temp Pulse Resp SpO2   21 0739 (!) 151/76 98.9 °F (37.2 °C) 86 18 96 %   21 0440 135/79 99.2 °F (37.3 °C) 86 16 95 %   21 133/87 98.5 °F (36.9 °C) 93 17 94 %   21 134/71 99.6 °F (37.6 °C) 81 17 96 %       EXAM:  Dressings clean, dry and intact   Positive strength/ROM bilat lower ext. Neuro intact to sensation  Calves, soft & nontender  BL LEs NVID    Attempted to remove hemovac drain during exam today. Gentle steady traction applied along the longitudinal plane. End of the drain tube appears to be broken off, suspect a portion is retained. Site cleaned. Sterile dressing applied. Will discuss with Dr. Beatris Rasmussen:  Continue prn PO pain medications  PT/OT, OOB w/ assist  Tolerating diet  Will make NPO      GABRIEL Henriquez  Orthopaedic Surgery    12:42 PM  Dr. Abel Caba spoke with the patient re: suspected retained portion of the pvc hemovac drain. Pro's and con's regarding removal of this was discussed. The patient just wants to watch if for now, does not want to consider further intervention a this time.    NPO order discontinued    Physician Assistant to Dr. Brad Ogden

## 2021-03-19 NOTE — PROGRESS NOTES
Comprehensive Nutrition Assessment      Type and Reason for Visit: Initial    Nutrition Recommendations/Plan:   1. Continue Regular diet- change to Consistent Carb if BG >180 mg/dL. 2. Added Ensure HP to try for Wound healing. Nutrition Assessment:       Pt admitted for Lumbar stenosis with neurogenic claudication [M48.062]  Lumbar spinal stenosis [M48.061]. Pt  has a past medical history of Hypertension, hyperlipidemia, Pre-diabetes, and vitamin D deficiency. Pt screened for LOS. Pt noted to be eat fair/well. Pt was NPO for a time today for possible procedure, then aborted and diet restarted. PO intake:   Patient Vitals for the past 168 hrs:   % Diet Eaten   03/18/21 0858 50 %   03/17/21 1844 75 %   03/17/21 1300 50 %   03/17/21 1010 20 %     Added Ensure HP BID for added protein intake. No n/v, c/d. No chew/swallow difficulties. Wt mostly stable over last 3 years. BG are WNL. A1c 6.2. Wt Readings from Last 10 Encounters:   03/16/21 98.7 kg (217 lb 9.5 oz)   03/04/21 98.7 kg (217 lb 9.5 oz)   01/21/21 96.6 kg (213 lb)   10/28/20 98 kg (216 lb)   09/09/20 95.3 kg (210 lb)   02/05/20 99.2 kg (218 lb 12.8 oz)   11/12/19 95.7 kg (211 lb)   07/31/19 95.6 kg (210 lb 12.8 oz)   04/23/19 101.2 kg (223 lb)   11/09/18 100.6 kg (221 lb 11.2 oz)       Malnutrition Assessment:  Malnutrition Status:  No malnutrition        Estimated Daily Nutrient Needs:  Energy (kcal):  1950  Protein (g):  99       Fluid (ml/day):  1950    Meal intake:   Patient Vitals for the past 168 hrs:   % Diet Eaten   03/18/21 0858 50 %   03/17/21 1844 75 %   03/17/21 1300 50 %   03/17/21 1010 20 %         Nutrition Related Findings:       Last BM 3/16, no edema documented.     Nutritionally Significant Medications:   Pericolace, Miralax, Pepcid, Vit D      Wounds:    Surgical incision       Current Nutrition Therapies:  DIET REGULAR    Anthropometric Measures:  · Height:  5' 4.8\" (164.6 cm)  · Current Body Wt:  98.4 kg (217 lb) · Admission Body Wt:       · Usual Body Wt:        · Ideal Body Wt:  124 lbs:  175 %   · BMI Category:  Obese class 2 (BMI 35.0-39. 9)       Nutrition Diagnosis:   · Increased nutrient needs related to increased demand for energy/nutrients as evidenced by (s/p laminectomy surgery)      Nutrition Interventions:   Food and/or Nutrient Delivery: Continue current diet, Start oral nutrition supplement  Nutrition Education and Counseling: No recommendations at this time  Coordination of Nutrition Care: Continue to monitor while inpatient, Interdisciplinary rounds    Goals:  Pt will consume >50% of meals and 1-2 ONS per day within 5-7 days       Nutrition Monitoring and Evaluation:   Behavioral-Environmental Outcomes: None identified  Food/Nutrient Intake Outcomes: Food and nutrient intake, Supplement intake  Physical Signs/Symptoms Outcomes: Biochemical data, Weight, Skin    Discharge Planning:    Continue current diet, Continue oral nutrition supplement     Electronically signed by Melanie Carrera Poste 1: [phone] Interpolation Flap Text: A decision was made to reconstruct the defect utilizing an interpolation axial flap and a staged reconstruction.  A telfa template was made of the defect.  This telfa template was then used to outline the interpolation flap.  The donor area for the pedicle flap was then injected with anesthesia.  The flap was excised through the skin and subcutaneous tissue down to the layer of the underlying musculature.  The interpolation flap was carefully excised within this deep plane to maintain its blood supply.  The edges of the donor site were undermined.   The donor site was closed in a primary fashion.  The pedicle was then rotated into position and sutured.  Once the tube was sutured into place, adequate blood supply was confirmed with blanching and refill.  The pedicle was then wrapped with xeroform gauze and dressed appropriately with a telfa and gauze bandage to ensure continued blood supply and protect the attached pedicle.

## 2021-03-19 NOTE — PROGRESS NOTES
3/19/2021     4:25 PM  DC order noted. [facility] notified of pt's DC, and DC clinicals attached in [employer]. Pt's family will provide transport. 2:55 PM  RUR:  9%  Risk Level: [x]Low []Moderate []High  Value-based purchasing: [] Yes [x] No  Bundle patient: [] Yes [x] No   Specify:     Transition of care plan:  1. Awaiting medical clearance and DC order. PT/OT following. 2. Home with [facility] for [facility] services. 3. Outpatient follow-up. 4. Pt's family to transport.

## 2021-03-19 NOTE — PROGRESS NOTES
Occupational therapy note:  Chart reviewed. Spoke with RN. Patient currently NPO for surgery later today. Will follow up with patient tomorrow for OT re evaluation. Cortney Mcpherson MS OTR/L

## 2021-03-19 NOTE — PROGRESS NOTES
Bedside shift change report given to Jono(oncoming nurse) by Alberta(offgoing nurse). Report included the following information SBAR, Kardex, Procedure Summary, MAR and Recent Results.

## 2021-03-20 NOTE — PROGRESS NOTES
Discharge Note:    IV access removed X1 without complication. Discharge instructions reviewed with the patient who expressed understanding. Opportunity for questions/clarification provided. Copy of instructions was given to patient. Reviewed next due times of all medications. Prescriptions sent to patient's primary pharmacy. Reviewed follow-up information, appropriate activity, and post-op symptoms that would warrant notifying the physician. All questions answered. Electronic signature pad failed, therefore patient signed AVS manually and copy was placed in the chart. All patient belongings gathered.  Patient escorted out via wheelchair by [facility] staff

## 2021-03-22 ENCOUNTER — TELEPHONE (OUTPATIENT)
Dept: FAMILY MEDICINE CLINIC | Age: 71
End: 2021-03-22

## 2021-03-22 NOTE — PROGRESS NOTES
4:24 PM     Spoke w/ pt by phone,  verified. Discussed CT results showing a retained portion of her hemovac drain. Options were discussed  Pt requests that I call back to her room shortly in order to speak w/ her daughter. 4:35 PM    Spoke w/ pt by phone,  verified. Requested that I speak w/ her Daughter. Spoke w/ Daughter. CT results again discussed as above. Pro's and con's of options again discussed- watch/monitor vs. Surgery on Monday for removal.   After discussion with her daughter, she again voices that she just wants to watch if for now, does not want to consider further intervention a this time  Will follow up in the office with Dr. Cynthia Álvarez  All ?s and concerns addressed    Pt spoke with Dr. Cynthia Álvarez and myself multiple times. The pro's and con's of her treatment options were discussed. She declines further surgery at this time. She wants to be discharged home to watch/monitor things with office follow up.      GABRIEL Shaw  Orthopaedic Spine Surgery  Physician Assistant to Dr. Fifi Baptiste

## 2021-03-22 NOTE — TELEPHONE ENCOUNTER
Inform me on to what day would be best to get pt scheduled       ----- Message from Pacheco Mohan sent at 3/22/2021 10:40 AM EDT -----  Regarding: Dr. Huffman Capulin  Appointment not available    Caller's first and last name and relationship to patient (if not the patient): Pt       Best contact number: [phone]      Preferred date and time: within 7-14 days      Scheduled appointment date and time: N/A      Reason for appointment: JASON GRANT for admission to [facility] on 3/16/21 and discharged 3/19/21 for back surgery.        Details to clarify the request: N/A       Pacheco Mohan

## 2021-03-25 DIAGNOSIS — M48.062 LUMBAR STENOSIS WITH NEUROGENIC CLAUDICATION: ICD-10-CM

## 2021-03-25 RX ORDER — HYDROMORPHONE HYDROCHLORIDE 2 MG/1
2-4 TABLET ORAL
Qty: 60 TAB | Refills: 0 | Status: SHIPPED | OUTPATIENT
Start: 2021-03-25 | End: 2021-04-01

## 2021-03-25 NOTE — PROGRESS NOTES
Medication refill note   [facility] are currently down. Praful Burrell is s/p lumbar fusion surgery. She is still requiring narcotic pain medication to control her PO pain. Requesting a refill of pain medication. Dilaudid Rx escribed to pt's pharmacy.      GABRIEL Jarrell  Orthopaedic Spine Surgery  Physician Assistant to Dr. Aristides Burton

## 2021-03-29 NOTE — DISCHARGE SUMMARY
DISCHARGE SUMMARY     Patient: Severino Robert                             Medical Record Number: [id number]                : 1950  Age: 69 y.o. Admit Date: 3/16/2021  Discharge Date: 3/19/2021  Admission Diagnosis: Lumbar stenosis with neurogenic claudication [M48.062]  Lumbar spinal stenosis [M48.061]  Discharge Diagnosis: Spinal Stenosis and Spondylolisrhesis Lumbar region  Procedures: Procedure(s):  L4-S1 LAMINECTOMY/ L4-5 FUSION INSTRUMENTATION/TLIF/BONEGRAFT  Surgeon: Mitzy Rudd MD  Co-surgeon:   Assistants:   Anesthesia: General  Complications: None     History of Present Illness:  Severino Robert is a 69 y.o. female with a history of intractable low back and radiculopathy. Despite conservative management and after clinical and radiographic evaluation, it was determined that she suffered from lumbar stenosis  and lumbar spondylolisthesis and would benefit from Procedure(s):  L4-S1 LAMINECTOMY/ L4-5 FUSION INSTRUMENTATION/TLIF/BONEGRAFT, which she consented to undergo after a discussion of the risks, benefits, alternatives, rehab concerns, and potential complications of surgery. Hospital Course: Severino Robert tolerated the procedure well. She was transferred  to the recovery room in stable condition. After a brief stay, the patient was then transferred to the Orthopedic floor. On postoperative day #1, the dressing was clean and dry and she was neurovascularly intact. The patient was afebrile and vital signs were stable. Calves were soft and non-tender bilaterally. Severino Robert made excellent progress with physical therapy and was discharged to Home with home health in stable condition on postoperative day 3. She was provided with routine postoperative instructions and advised to follow up in my office in 3 weeks following discharge from the hospital.  She was given prescriptions for medication to control post-operative symptoms.     Discharge Medications:  Discharge Medication List as of 3/19/2021  5:28 PM      START taking these medications    Details   senna-docusate (PERICOLACE) 8.6-50 mg per tablet Take 1 Tab by mouth two (2) times a day., Normal, Disp-60 Tab, R-0      naloxone (NARCAN) 4 mg/actuation nasal spray Use 1 spray intranasally, then discard. Repeat with new spray every 2 min as needed for opioid overdose symptoms, alternating nostrils. , Normal, Disp-1 Each, R-0      HYDROmorphone (DILAUDID) 2 mg tablet Take 1-2 Tabs by mouth every four (4) hours as needed for Pain for up to 7 days. Max Daily Amount: 24 mg., Normal, Disp-50 Tab, R-0         CONTINUE these medications which have NOT CHANGED    Details   lisinopriL (PRINIVIL, ZESTRIL) 10 mg tablet Take 10 mg by mouth every other day., Historical Med      psyllium (METAMUCIL) powd Take 1 Scoop by mouth every morning., Historical Med      gabapentin (NEURONTIN) 300 mg capsule Take 300 mg by mouth two (2) times a day., Historical Med      triamterene-hydroCHLOROthiazide (DYAZIDE) 37.5-25 mg per capsule TAKE 1 CAPSULE EVERY DAY, Normal, Disp-90 Cap, R-0      acetaminophen (Tylenol Arthritis Pain) 650 mg TbER Take 1 Tab by mouth every eight (8) hours. , Normal, Disp-90 Tab, R-2      VITAMIN D3 2,000 unit cap capsule Take 1 Cap by mouth every morning., Historical Med         STOP taking these medications       diclofenac (VOLTAREN) 1 % gel Comments:   Reason for Stopping:               GABRIEL Castillo  3/19/2021  Orthopaedic Surgery  Physician Assistant to Dr. Vipul Mcdonnell

## 2021-03-31 ENCOUNTER — VIRTUAL VISIT (OUTPATIENT)
Dept: FAMILY MEDICINE CLINIC | Age: 71
End: 2021-03-31
Payer: MEDICARE

## 2021-03-31 DIAGNOSIS — K59.00 CONSTIPATION, UNSPECIFIED CONSTIPATION TYPE: Primary | ICD-10-CM

## 2021-03-31 DIAGNOSIS — M48.00 SPINAL STENOSIS, UNSPECIFIED SPINAL REGION: ICD-10-CM

## 2021-03-31 PROCEDURE — G8427 DOCREV CUR MEDS BY ELIG CLIN: HCPCS | Performed by: FAMILY MEDICINE

## 2021-03-31 PROCEDURE — 99214 OFFICE O/P EST MOD 30 MIN: CPT | Performed by: FAMILY MEDICINE

## 2021-03-31 NOTE — PROGRESS NOTES
1. Have you been to the ER, urgent care clinic since your last visit? Hospitalized since your last visit? Yes When: 3/16/2021 - 3/19/2021  Where: [facility] Reason for visit: Back Surgery    2. Have you seen or consulted any other health care providers outside of the [address] [place] [address] since your last visit? Include any pap smears or colon screening. No    Chief Complaint   Patient presents with    Surgical Follow-up     Patient states following up from Back surgery. Patient states back feeling good today. Patient states major concern not being able to use the bathroom. Patient states taking Mirlax and stool softners not helping. Patient-Reported Vitals 3/31/2021   Patient-Reported Weight 200lb      3 most recent PHQ Screens 3/31/2021   Little interest or pleasure in doing things Not at all   Feeling down, depressed, irritable, or hopeless Not at all   Total Score PHQ 2 0     Health Maintenance Due   Topic Date Due    COVID-19 Vaccine (1) Never done    Pneumococcal 65+ years (1 of 1 - PPSV23) Never done    Shingrix Vaccine Age 50> (2 of 2) 12/19/2018    Foot Exam Q1  07/31/2020     Abuse Screening Questionnaire 3/31/2021   Do you ever feel afraid of your partner? N   Are you in a relationship with someone who physically or mentally threatens you? N   Is it safe for you to go home?  Y     Learning Assessment 3/31/2021   PRIMARY LEARNER Patient   HIGHEST LEVEL OF EDUCATION - PRIMARY LEARNER  GRADUATED HIGH SCHOOL OR GED   BARRIERS PRIMARY LEARNER NONE   CO-LEARNER CAREGIVER -   PRIMARY LANGUAGE ENGLISH    NEED -   LEARNER PREFERENCE PRIMARY DEMONSTRATION   LEARNING SPECIAL TOPICS -   ANSWERED BY patient   RELATIONSHIP SELF

## 2021-03-31 NOTE — PROGRESS NOTES
Petra Pope is a 69 y.o. female who was seen by synchronous (real-time) audio-video technology on 3/31/2021 for Surgical Follow-up (Patient states following up from Back surgery. Patient states back feeling good today. Patient states major concern not being able to use the bathroom. Patient states taking Mirlax and stool softners not helping.)    15 days ago she had surgery and was discharged 3/19 from the hospital 12 days ago  She had a portion of the hemovac drain retained. a PA from the hospital called her after discharge to check on her and to let her know about this. She decided to monitor it and not have surgery to remove it  She is c/o constipated. Her last BM was 5-6 days ago  There is no pain  She is taking miralax and drinking prune juice    Taking dilauded in pm and am  She says pain is much better but she is moree than 7 days post op still taking the narcotics    Assessment & Plan:   Diagnoses and all orders for this visit:    1. Constipation, unspecified constipation type  -     sodium phosphates (FLEET'S) 9.5-3.5 gram/59 mL enema; Insert 66 mL into rectum now for 1 dose. The pain meds are causing the constipation  2. Spinal stenosis, unspecified spinal region    recent surgery  D/c narcotics  Use tyleniol and nsaids prn  She has has home nurse checking her vitals and the wound and all has been normal  She has been getting PT as well and they feel she is independent now        Subjective:       Prior to Admission medications    Medication Sig Start Date End Date Taking? Authorizing Provider   sodium phosphates (FLEET'S) 9.5-3.5 gram/59 mL enema Insert 66 mL into rectum now for 1 dose. 3/31/21 3/31/21 Yes Chema Webber MD   HYDROmorphone (DILAUDID) 2 mg tablet Take 1-2 Tabs by mouth every four (4) hours as needed for Pain for up to 7 days. Max Daily Amount: 24 mg. 3/25/21 4/1/21 Yes Morena Rod PA   senna-docusate (PERICOLACE) 8.6-50 mg per tablet Take 1 Tab by mouth two (2) times a day. 3/19/21  Yes Helen Robbins NP   lisinopriL (PRINIVIL, ZESTRIL) 10 mg tablet Take 10 mg by mouth every other day. Yes Provider, Historical   triamterene-hydroCHLOROthiazide (DYAZIDE) 37.5-25 mg per capsule TAKE 1 CAPSULE EVERY DAY 2/18/21  Yes Oneal Duane, MD   VITAMIN D3 2,000 unit cap capsule Take 1 Cap by mouth every morning. 4/27/15  Yes Provider, Historical   naloxone (NARCAN) 4 mg/actuation nasal spray Use 1 spray intranasally, then discard. Repeat with new spray every 2 min as needed for opioid overdose symptoms, alternating nostrils. 3/19/21   Helen Robbins NP   psyllium (METAMUCIL) powd Take 1 Scoop by mouth every morning. Provider, Historical   gabapentin (NEURONTIN) 300 mg capsule Take 300 mg by mouth two (2) times a day. Provider, Historical   acetaminophen (Tylenol Arthritis Pain) 650 mg TbER Take 1 Tab by mouth every eight (8) hours. 12/21/20   Oneal Duane, MD     Patient Active Problem List    Diagnosis Date Noted    Lumbar spinal stenosis 03/17/2021    Lumbar stenosis with neurogenic claudication 03/16/2021    Type 2 diabetes with nephropathy ([address] 10/31/2018    Severe obesity (BMI 35.0-39. 9) with comorbidity ([address] 05/08/2018    Fibromyalgia 03/13/2018    Primary osteoarthritis of both knees 03/13/2018    Obesity (BMI 30-39.9) 03/13/2018    Chronic back pain greater than 3 months duration 03/13/2018    Type 2 diabetes mellitus with diabetic neuropathy ([address] 01/16/2018    Controlled type 2 diabetes mellitus without complication, without long-term current use of insulin ([address] 11/14/2016    Diabetes mellitus type 2, controlled ([address] 03/30/2016    Arthritis 05/27/2015    Hypercholesteremia 05/27/2015    Essential hypertension 05/27/2015    Insomnia 05/27/2015    Hx of diabetes mellitus 05/27/2015     Current Outpatient Medications   Medication Sig Dispense Refill    sodium phosphates (FLEET'S) 9.5-3.5 gram/59 mL enema Insert 66 mL into rectum now for 1 dose.  2 Bottle 1    HYDROmorphone (DILAUDID) 2 mg tablet Take 1-2 Tabs by mouth every four (4) hours as needed for Pain for up to 7 days. Max Daily Amount: 24 mg. 60 Tab 0    senna-docusate (PERICOLACE) 8.6-50 mg per tablet Take 1 Tab by mouth two (2) times a day. 60 Tab 0    lisinopriL (PRINIVIL, ZESTRIL) 10 mg tablet Take 10 mg by mouth every other day.  triamterene-hydroCHLOROthiazide (DYAZIDE) 37.5-25 mg per capsule TAKE 1 CAPSULE EVERY DAY 90 Cap 0    VITAMIN D3 2,000 unit cap capsule Take 1 Cap by mouth every morning.  naloxone (NARCAN) 4 mg/actuation nasal spray Use 1 spray intranasally, then discard. Repeat with new spray every 2 min as needed for opioid overdose symptoms, alternating nostrils. 1 Each 0    psyllium (METAMUCIL) powd Take 1 Scoop by mouth every morning.  gabapentin (NEURONTIN) 300 mg capsule Take 300 mg by mouth two (2) times a day.  acetaminophen (Tylenol Arthritis Pain) 650 mg TbER Take 1 Tab by mouth every eight (8) hours.  90 Tab 2       ROS    Objective:     Patient-Reported Vitals 3/31/2021   Patient-Reported Weight 200lb        [INSTRUCTIONS:  \"[x]\" Indicates a positive item  \"[]\" Indicates a negative item  -- DELETE ALL ITEMS NOT EXAMINED]    Constitutional: [x] Appears well-developed and well-nourished [x] No apparent distress      [] Abnormal -     Mental status: [x] Alert and awake  [x] Oriented to person/place/time [x] Able to follow commands    [] Abnormal -     Eyes:   EOM    [x]  Normal    [] Abnormal -   Sclera  [x]  Normal    [] Abnormal -          Discharge [x]  None visible   [] Abnormal -     HENT: [x] Normocephalic, atraumatic  [] Abnormal -   [x] Mouth/Throat: Mucous membranes are moist    External Ears [x] Normal  [] Abnormal -    Neck: [x] No visualized mass [] Abnormal -     Pulmonary/Chest: [x] Respiratory effort normal   [x] No visualized signs of difficulty breathing or respiratory distress        [] Abnormal -      Musculoskeletal:   [x] Normal gait with no signs of ataxia         [x] Normal range of motion of neck        [] Abnormal -     Neurological:        [x] No Facial Asymmetry (Cranial nerve 7 motor function) (limited exam due to video visit)          [x] No gaze palsy        [] Abnormal -          Skin:        [x] No significant exanthematous lesions or discoloration noted on facial skin         [] Abnormal -            Psychiatric:       [x] Normal Affect [] Abnormal -        [x] No Hallucinations    Other pertinent observable physical exam findings:-        We discussed the expected course, resolution and complications of the diagnosis(es) in detail. Medication risks, benefits, costs, interactions, and alternatives were discussed as indicated. I advised her to contact the office if her condition worsens, changes or fails to improve as anticipated. She expressed understanding with the diagnosis(es) and plan. Will Posey, was evaluated through a synchronous (real-time) audio-video encounter. The patient (or guardian if applicable) is aware that this is a billable service. Verbal consent to proceed has been obtained within the past 12 months. The visit was conducted pursuant to the emergency declaration under the [address] [place] [address] authority and the [employer] and [employer] General Act. Patient identification was verified, and a caregiver was present when appropriate. The patient was located in a state where the provider was credentialed to provide care.       Jose D Montes MD

## 2021-04-14 ENCOUNTER — TELEPHONE (OUTPATIENT)
Dept: FAMILY MEDICINE CLINIC | Age: 71
End: 2021-04-14

## 2021-04-14 NOTE — TELEPHONE ENCOUNTER
[facility] pharmacy called and requested to speak to Dr. China Nguyễn nurse. Pharmacy Tech stated that they sent over a request for a 90 Day supply request on 4/6/21 and has not heard anything. Pharmacy stated they would like an updated prescription faxed over to them.  Phone number is [phone] and Fax is [phone]

## 2021-04-23 ENCOUNTER — TELEPHONE (OUTPATIENT)
Dept: FAMILY MEDICINE CLINIC | Age: 71
End: 2021-04-23

## 2021-04-23 NOTE — TELEPHONE ENCOUNTER
Please advise     I was going to send for you but not sure if this is the fax number or office number.    I also googled and there are a number of Dr Arcenio Garza that pop up.        ----- Message from Gypsy Guevara sent at 4/23/2021 10:45 AM EDT -----  Regarding: Dr. Bryson Pedraza first and last name: n/a      Reason for call: Notes and lab results from last two appts are needed for Dr. Mandeep Roberts [phone]      Callback required yes/no and why: yes      Best contact number(s): [phone]      Details to clarify the request: n/a      Gypsy Guevara

## 2021-04-28 ENCOUNTER — TELEPHONE (OUTPATIENT)
Dept: FAMILY MEDICINE CLINIC | Age: 71
End: 2021-04-28

## 2021-04-28 NOTE — TELEPHONE ENCOUNTER
----- Message from Tiffanie Bowman sent at 4/28/2021 11:24 AM EDT -----  Regarding: Dr. Apolonia Parada first and last name: N/A      Reason for call: Referral status. Callback required yes/no and why: yes      Best contact number(s): [phone]      Details to clarify the request: Patient stated she has requested a referral for the \"Rheumatologist\". Patient would like to check the status of this referral. This referral is for Dr. Dylan Fofana. Phone # [phone] Fax #: [phone].  Patient stated she has already sent this information to the office twice for this referral.      Tiffanie Bowman

## 2021-04-29 ENCOUNTER — TELEPHONE (OUTPATIENT)
Dept: FAMILY MEDICINE CLINIC | Age: 71
End: 2021-04-29

## 2021-04-29 DIAGNOSIS — M25.50 MULTIPLE JOINT PAIN: Primary | ICD-10-CM

## 2021-04-29 NOTE — TELEPHONE ENCOUNTER
----- Message from Jessica Damon sent at 4/29/2021 10:41 AM EDT -----  Regarding: Referral Update  Contact: [phone]  General Message/Vendor Calls    Caller's first and last name: NA      Reason for call: Requesting to get an update on if MD London Pololck has completed diagnosis on referral for her rheumatologist yet so patient can make appt. Callback required yes/no and why: Yes, confirmation of diagnosis for rheumatologist to make appt. Best contact number(s): [phone]       Details to clarify the request: Patient advising she is needing MD London Pollock to complete referral so she can make a rheumatology appt with MD Stephie Gr. Patient provided information for MD Stephie Gr as Phone # [phone] Fax #: [phone]. Stating that the reason she needs to be seen is due to knee pain and possible arthritis. Please call patient to confirm once referral has been sent so she can make appt.        Jessica Damon

## 2021-04-29 NOTE — TELEPHONE ENCOUNTER
Left message on vm to call office back. 1st attempt. LM to call back with details about seeing Rheumatologist.  Dr. Ned Finch will need to put Dx or comment on referral why patient needs to be evaluated.

## 2021-04-30 ENCOUNTER — TELEPHONE (OUTPATIENT)
Dept: FAMILY MEDICINE CLINIC | Age: 71
End: 2021-04-30

## 2021-05-07 ENCOUNTER — TELEPHONE (OUTPATIENT)
Dept: FAMILY MEDICINE CLINIC | Age: 71
End: 2021-05-07

## 2021-05-07 NOTE — TELEPHONE ENCOUNTER
Left message on vm to call office back. 1st attempt  Referral to Luis Dimas sent via fax.   Fax#: [phone]  File#: [id number]

## 2021-05-07 NOTE — TELEPHONE ENCOUNTER
----- Message from Stoneville Daughters sent at 5/7/2021 10:58 AM EDT -----  Regarding: Dr. Galen Morgan / telephone  General Message/Vendor Calls    Caller's first and last name: Ari Leung      Reason for call: Pt has called office 4 times trying to get a referral to see  Dr. Autumn Crook   fax [phone] required yes/no and why:      Best contact number(s):[phone]      Details to clarify the request: pt is needing fax sent to Dr. Autumn Crook , she has been told last Friday that office did send referall over but she checked Monday and today and still Dr. Roland Ani office hasn't received referral order. Pt is in a lot of pain due to rheumatoid arthritis.        Mynra Saenz

## 2021-06-07 RX ORDER — LISINOPRIL 10 MG/1
10 TABLET ORAL EVERY OTHER DAY
OUTPATIENT
Start: 2021-06-07

## 2021-06-07 RX ORDER — LANCETS
EACH MISCELLANEOUS
Qty: 100 EACH | Refills: 3 | OUTPATIENT
Start: 2021-06-07

## 2021-06-07 NOTE — TELEPHONE ENCOUNTER
I did not prescribe the BP meds every other day  Please get more information.  The dose may need to be decreased if that is why she is taking it every other day  The BP medicine needs to be taken daily unless the bp is checked first and determined to be too low to take the medicien

## 2021-06-07 NOTE — TELEPHONE ENCOUNTER
PCP: Eileen Floyd MD    Last appt: 3/31/2021  No future appointments. Also, requesting Accu Chek Betariz Plus Meter    Requested Prescriptions     Pending Prescriptions Disp Refills    lisinopriL (PRINIVIL, ZESTRIL) 10 mg tablet       Sig: Take 1 Tablet by mouth every other day.     lancets (Accu-Chek Softclix Lancets) misc 100 Each 3     Sig: Check  Blood sugar daily E11.9       Prior labs and Blood pressures:  BP Readings from Last 3 Encounters:   03/19/21 (!) 151/76   03/04/21 (!) 159/79   01/21/21 124/84     Lab Results   Component Value Date/Time    Sodium 136 03/19/2021 06:32 AM    Potassium 3.8 03/19/2021 06:32 AM    Chloride 101 03/19/2021 06:32 AM    CO2 29 03/19/2021 06:32 AM    Anion gap 6 03/19/2021 06:32 AM    Glucose 99 03/19/2021 06:32 AM    BUN 16 03/19/2021 06:32 AM    Creatinine 0.82 03/19/2021 06:32 AM    BUN/Creatinine ratio 20 03/19/2021 06:32 AM    GFR est AA >60 03/19/2021 06:32 AM    GFR est non-AA >60 03/19/2021 06:32 AM    Calcium 9.6 03/19/2021 06:32 AM     Lab Results   Component Value Date/Time    Hemoglobin A1c 6.2 (H) 03/04/2021 04:09 PM    Hemoglobin A1c, External 6.6 11/08/2014 12:00 AM     Lab Results   Component Value Date/Time    Cholesterol, total 227 (H) 09/15/2020 03:10 PM    HDL Cholesterol 53 09/15/2020 03:10 PM    LDL, calculated 149.2 (H) 09/15/2020 03:10 PM    VLDL, calculated 24.8 09/15/2020 03:10 PM    Triglyceride 124 09/15/2020 03:10 PM    CHOL/HDL Ratio 4.3 09/15/2020 03:10 PM     Lab Results   Component Value Date/Time    Vitamin D 25-Hydroxy 37.0 03/04/2021 04:09 PM       No results found for: TSH, TSH2, TSH3, TSHP, TSHEXT

## 2021-06-28 ENCOUNTER — TELEPHONE (OUTPATIENT)
Dept: FAMILY MEDICINE CLINIC | Age: 71
End: 2021-06-28

## 2021-06-28 NOTE — TELEPHONE ENCOUNTER
----- Message from Judy Uriarte sent at 6/24/2021 10:54 AM EDT -----  Regarding: Dr. Billy Hurtado first and last name: [facility]      Reason for call: Prescription status. Callback required yes/no and why: no, if needed      Best contact number(s): [phone]      Details to clarify the request: Caller is checking the status of the prescriptions \"Lisinopril\" 10mg and  \"Accucheck fast click lancets\" for this patient. Caller stated they have tried several attempts for this and have not received a response from the provider as of today.       Judy Uriarte

## 2021-07-06 ENCOUNTER — TELEPHONE (OUTPATIENT)
Dept: FAMILY MEDICINE CLINIC | Age: 71
End: 2021-07-06

## 2021-07-06 NOTE — TELEPHONE ENCOUNTER
----- Message from Hannah Fischer sent at 7/6/2021  2:50 PM EDT -----  Regarding: Dr. Stanley Rush  Patient return call    Caller's first and last name and relationship (if not the patient): n/a      Best contact number(s): [phone]      Whose call is being returned: n/a      Details to clarify the request: [address]

## 2021-07-12 ENCOUNTER — VIRTUAL VISIT (OUTPATIENT)
Dept: FAMILY MEDICINE CLINIC | Age: 71
End: 2021-07-12
Payer: MEDICARE

## 2021-07-12 DIAGNOSIS — I10 ESSENTIAL HYPERTENSION: Primary | ICD-10-CM

## 2021-07-12 DIAGNOSIS — E11.9 CONTROLLED TYPE 2 DIABETES MELLITUS WITHOUT COMPLICATION, WITHOUT LONG-TERM CURRENT USE OF INSULIN (HCC): ICD-10-CM

## 2021-07-12 DIAGNOSIS — E66.01 SEVERE OBESITY (BMI 35.0-35.9 WITH COMORBIDITY) (HCC): ICD-10-CM

## 2021-07-12 PROCEDURE — 1090F PRES/ABSN URINE INCON ASSESS: CPT | Performed by: FAMILY MEDICINE

## 2021-07-12 PROCEDURE — 3044F HG A1C LEVEL LT 7.0%: CPT | Performed by: FAMILY MEDICINE

## 2021-07-12 PROCEDURE — G8432 DEP SCR NOT DOC, RNG: HCPCS | Performed by: FAMILY MEDICINE

## 2021-07-12 PROCEDURE — G9899 SCRN MAM PERF RSLTS DOC: HCPCS | Performed by: FAMILY MEDICINE

## 2021-07-12 PROCEDURE — 3017F COLORECTAL CA SCREEN DOC REV: CPT | Performed by: FAMILY MEDICINE

## 2021-07-12 PROCEDURE — 1101F PT FALLS ASSESS-DOCD LE1/YR: CPT | Performed by: FAMILY MEDICINE

## 2021-07-12 PROCEDURE — G8427 DOCREV CUR MEDS BY ELIG CLIN: HCPCS | Performed by: FAMILY MEDICINE

## 2021-07-12 PROCEDURE — G8536 NO DOC ELDER MAL SCRN: HCPCS | Performed by: FAMILY MEDICINE

## 2021-07-12 PROCEDURE — G8417 CALC BMI ABV UP PARAM F/U: HCPCS | Performed by: FAMILY MEDICINE

## 2021-07-12 PROCEDURE — G8399 PT W/DXA RESULTS DOCUMENT: HCPCS | Performed by: FAMILY MEDICINE

## 2021-07-12 PROCEDURE — G8756 NO BP MEASURE DOC: HCPCS | Performed by: FAMILY MEDICINE

## 2021-07-12 PROCEDURE — 2022F DILAT RTA XM EVC RTNOPTHY: CPT | Performed by: FAMILY MEDICINE

## 2021-07-12 PROCEDURE — 99213 OFFICE O/P EST LOW 20 MIN: CPT | Performed by: FAMILY MEDICINE

## 2021-07-12 RX ORDER — LISINOPRIL 10 MG/1
10 TABLET ORAL EVERY OTHER DAY
Qty: 90 TABLET | Refills: 1 | Status: SHIPPED | OUTPATIENT
Start: 2021-07-12 | End: 2022-10-05

## 2021-07-12 NOTE — PROGRESS NOTES
1. Have you been to the ER, urgent care clinic since your last visit? Hospitalized since your last visit? No    2. Have you seen or consulted any other health care providers outside of the [address] [place] [address] since your last visit? Include any pap smears or colon screening. No     Chief Complaint   Patient presents with    Medication Management     Patient-Reported Vitals 7/12/2021   Patient-Reported Weight 204lb      3 most recent PHQ Screens 7/12/2021   Little interest or pleasure in doing things Not at all   Feeling down, depressed, irritable, or hopeless Not at all   Total Score PHQ 2 0     Abuse Screening Questionnaire 7/12/2021   Do you ever feel afraid of your partner? N   Are you in a relationship with someone who physically or mentally threatens you? N   Is it safe for you to go home? Y     Learning Assessment 3/31/2021   PRIMARY LEARNER Patient   HIGHEST LEVEL OF EDUCATION - PRIMARY LEARNER  GRADUATED HIGH SCHOOL OR GED   BARRIERS PRIMARY LEARNER NONE   CO-LEARNER CAREGIVER -   PRIMARY LANGUAGE ENGLISH    NEED -   LEARNER PREFERENCE PRIMARY DEMONSTRATION   LEARNING SPECIAL TOPICS -   ANSWERED BY patient   RELATIONSHIP SELF     Health Maintenance Due   Topic Date Due    COVID-19 Vaccine (1) Never done    Pneumococcal 65+ years (1 of 1 - PPSV23) Never done    Shingrix Vaccine Age 50> (2 of 2) 12/19/2018    Foot Exam Q1  07/31/2020    Colorectal Cancer Screening Combo  06/30/2021     Patient medical supplies Lancets and Accu chek guide meter called into pharmacy as requested.

## 2021-07-12 NOTE — PROGRESS NOTES
Jose Arroyo is a 66 y.o. female who was seen by synchronous (real-time) audio-video technology on 7/12/2021 for Medication Management  needs refills on bp meds   no c/po negative side effects    No chest pain or SOB  Assessment & Plan:   Diagnoses and all orders for this visit:    1. Essential hypertension  -     lisinopriL (PRINIVIL, ZESTRIL) 10 mg tablet; Take 1 Tablet by mouth every other day. 2. Controlled type 2 diabetes mellitus without complication, without long-term current use of insulin ([address]    3. Severe obesity (BMI 35.0-35.9 with comorbidity) ([facility])            Subjective:       Prior to Admission medications    Medication Sig Start Date End Date Taking? Authorizing Provider   senna-docusate (PERICOLACE) 8.6-50 mg per tablet Take 1 Tab by mouth two (2) times a day. 3/19/21  Yes Ara Robbins NP   lisinopriL (PRINIVIL, ZESTRIL) 10 mg tablet Take 10 mg by mouth every other day. Yes Provider, Historical   triamterene-hydroCHLOROthiazide (DYAZIDE) 37.5-25 mg per capsule TAKE 1 CAPSULE EVERY DAY 2/18/21  Yes Joycelyn Stock MD   VITAMIN D3 2,000 unit cap capsule Take 1 Cap by mouth every morning. 4/27/15  Yes Provider, Historical   lancets (Accu-Chek Softclix Lancets) misc Check  Blood sugar daily E11.9  Patient not taking: Reported on 7/12/2021 4/15/21   Joycelyn Stock MD   naloxone [facility]) 4 mg/actuation nasal spray Use 1 spray intranasally, then discard. Repeat with new spray every 2 min as needed for opioid overdose symptoms, alternating nostrils. Patient not taking: Reported on 7/12/2021 3/19/21   Ara Robbins NP   psyllium (METAMUCIL) powd Take 1 Scoop by mouth every morning. Patient not taking: Reported on 7/12/2021    Provider, Historical   gabapentin (NEURONTIN) 300 mg capsule Take 300 mg by mouth two (2) times a day.   Patient not taking: Reported on 7/12/2021    Provider, Historical   acetaminophen (Tylenol Arthritis Pain) 650 mg TbER Take 1 Tab by mouth every eight (8) hours. Patient not taking: Reported on 7/12/2021 12/21/20   Emir Law MD     Patient Active Problem List    Diagnosis Date Noted    Lumbar spinal stenosis 03/17/2021    Lumbar stenosis with neurogenic claudication 03/16/2021    Type 2 diabetes with nephropathy ([address] 10/31/2018    Severe obesity (BMI 35.0-39. 9) with comorbidity ([address] 05/08/2018    Fibromyalgia 03/13/2018    Primary osteoarthritis of both knees 03/13/2018    Obesity (BMI 30-39.9) 03/13/2018    Chronic back pain greater than 3 months duration 03/13/2018    Type 2 diabetes mellitus with diabetic neuropathy ([address] 01/16/2018    Controlled type 2 diabetes mellitus without complication, without long-term current use of insulin ([address] 11/14/2016    Diabetes mellitus type 2, controlled ([address] 03/30/2016    Arthritis 05/27/2015    Hypercholesteremia 05/27/2015    Essential hypertension 05/27/2015    Insomnia 05/27/2015    Hx of diabetes mellitus 05/27/2015     Current Outpatient Medications   Medication Sig Dispense Refill    lisinopriL (PRINIVIL, ZESTRIL) 10 mg tablet Take 1 Tablet by mouth every other day. 90 Tablet 1    senna-docusate (PERICOLACE) 8.6-50 mg per tablet Take 1 Tab by mouth two (2) times a day. 60 Tab 0    triamterene-hydroCHLOROthiazide (DYAZIDE) 37.5-25 mg per capsule TAKE 1 CAPSULE EVERY DAY 90 Cap 0    VITAMIN D3 2,000 unit cap capsule Take 1 Cap by mouth every morning.  lancets (Accu-Chek Softclix Lancets) misc Check  Blood sugar daily E11.9 (Patient not taking: Reported on 7/12/2021) 100 Each 3    naloxone (NARCAN) 4 mg/actuation nasal spray Use 1 spray intranasally, then discard. Repeat with new spray every 2 min as needed for opioid overdose symptoms, alternating nostrils. (Patient not taking: Reported on 7/12/2021) 1 Each 0    psyllium (METAMUCIL) powd Take 1 Scoop by mouth every morning.  (Patient not taking: Reported on 7/12/2021)      gabapentin (NEURONTIN) 300 mg capsule Take 300 mg by mouth two (2) times a day. (Patient not taking: Reported on 7/12/2021)      acetaminophen (Tylenol Arthritis Pain) 650 mg TbER Take 1 Tab by mouth every eight (8) hours. (Patient not taking: Reported on 7/12/2021) 90 Tab 2       ROS    Objective:     Patient-Reported Vitals 7/12/2021   Patient-Reported Weight 204lb        [INSTRUCTIONS:  \"[x]\" Indicates a positive item  \"[]\" Indicates a negative item  -- DELETE ALL ITEMS NOT EXAMINED]    Constitutional: [x] Appears well-developed and well-nourished [x] No apparent distress      [] Abnormal -     Mental status: [x] Alert and awake  [x] Oriented to person/place/time [x] Able to follow commands    [] Abnormal -     Eyes:   EOM    [x]  Normal    [] Abnormal -   Sclera  [x]  Normal    [] Abnormal -          Discharge [x]  None visible   [] Abnormal -     HENT: [x] Normocephalic, atraumatic  [] Abnormal -   [x] Mouth/Throat: Mucous membranes are moist    External Ears [x] Normal  [] Abnormal -    Neck: [x] No visualized mass [] Abnormal -     Pulmonary/Chest: [x] Respiratory effort normal   [x] No visualized signs of difficulty breathing or respiratory distress        [] Abnormal -      Musculoskeletal:   [x] Normal gait with no signs of ataxia         [x] Normal range of motion of neck        [] Abnormal -     Neurological:        [x] No Facial Asymmetry (Cranial nerve 7 motor function) (limited exam due to video visit)          [x] No gaze palsy        [] Abnormal -          Skin:        [x] No significant exanthematous lesions or discoloration noted on facial skin         [] Abnormal -            Psychiatric:       [x] Normal Affect [] Abnormal -        [x] No Hallucinations    Other pertinent observable physical exam findings:-        We discussed the expected course, resolution and complications of the diagnosis(es) in detail. Medication risks, benefits, costs, interactions, and alternatives were discussed as indicated.   I advised her to contact the office if her condition worsens, changes or fails to improve as anticipated. She expressed understanding with the diagnosis(es) and plan. Sunshine Warnerashley, was evaluated through a synchronous (real-time) audio-video encounter. The patient (or guardian if applicable) is aware that this is a billable service. Verbal consent to proceed has been obtained within the past 12 months. The visit was conducted pursuant to the emergency declaration under the [address] [place] [address] and the [employer] Act. Patient identification was verified, and a caregiver was present when appropriate. The patient was located in a state where the provider was credentialed to provide care.       Zunilda Silva MD

## 2021-07-30 ENCOUNTER — TELEPHONE (OUTPATIENT)
Dept: FAMILY MEDICINE CLINIC | Age: 71
End: 2021-07-30

## 2021-07-30 NOTE — TELEPHONE ENCOUNTER
[employer] rx form reviewed and signed by Dr. Jaspal palma sent via fax.   Accu-chek fely plus test strips  BD single use swab  Accu-chek fely solution    Fax#: [phone]  [facility]#:[id number]

## 2021-09-21 RX ORDER — BLOOD-GLUCOSE METER
EACH MISCELLANEOUS
Qty: 1 EACH | Refills: 0 | Status: SHIPPED | OUTPATIENT
Start: 2021-09-21

## 2021-10-06 DIAGNOSIS — I10 ESSENTIAL HYPERTENSION: ICD-10-CM

## 2021-10-11 ENCOUNTER — VIRTUAL VISIT (OUTPATIENT)
Dept: FAMILY MEDICINE CLINIC | Age: 71
End: 2021-10-11
Payer: MEDICARE

## 2021-10-11 DIAGNOSIS — M25.50 MULTIPLE JOINT PAIN: ICD-10-CM

## 2021-10-11 DIAGNOSIS — L23.7 POISON IVY: Primary | ICD-10-CM

## 2021-10-11 PROCEDURE — G8536 NO DOC ELDER MAL SCRN: HCPCS | Performed by: FAMILY MEDICINE

## 2021-10-11 PROCEDURE — G8510 SCR DEP NEG, NO PLAN REQD: HCPCS | Performed by: FAMILY MEDICINE

## 2021-10-11 PROCEDURE — 1090F PRES/ABSN URINE INCON ASSESS: CPT | Performed by: FAMILY MEDICINE

## 2021-10-11 PROCEDURE — 3017F COLORECTAL CA SCREEN DOC REV: CPT | Performed by: FAMILY MEDICINE

## 2021-10-11 PROCEDURE — G8427 DOCREV CUR MEDS BY ELIG CLIN: HCPCS | Performed by: FAMILY MEDICINE

## 2021-10-11 PROCEDURE — G8399 PT W/DXA RESULTS DOCUMENT: HCPCS | Performed by: FAMILY MEDICINE

## 2021-10-11 PROCEDURE — 1101F PT FALLS ASSESS-DOCD LE1/YR: CPT | Performed by: FAMILY MEDICINE

## 2021-10-11 PROCEDURE — 99213 OFFICE O/P EST LOW 20 MIN: CPT | Performed by: FAMILY MEDICINE

## 2021-10-11 PROCEDURE — G8417 CALC BMI ABV UP PARAM F/U: HCPCS | Performed by: FAMILY MEDICINE

## 2021-10-11 PROCEDURE — G8756 NO BP MEASURE DOC: HCPCS | Performed by: FAMILY MEDICINE

## 2021-10-11 PROCEDURE — G9899 SCRN MAM PERF RSLTS DOC: HCPCS | Performed by: FAMILY MEDICINE

## 2021-10-11 RX ORDER — TRIAMTERENE AND HYDROCHLOROTHIAZIDE 37.5; 25 MG/1; MG/1
CAPSULE ORAL
Qty: 90 CAPSULE | Refills: 0 | Status: SHIPPED | OUTPATIENT
Start: 2021-10-11 | End: 2022-03-15

## 2021-10-11 NOTE — PROGRESS NOTES
1. Have you been to the ER, urgent care clinic since your last visit? Hospitalized since your last visit? No    2. Have you seen or consulted any other health care providers outside of the [address] [place] [address] since your last visit? Include any pap smears or colon screening. No  Chief Complaint   Patient presents with    Rash     Paitent reports arms and legs rash spreading x1 week. Patient reports topical cream and lotion helping.  Discuss Medications     medical marijuana     Health Maintenance Due   Topic Date Due    COVID-19 Vaccine (1) Never done    Pneumococcal 65+ years (1 of 1 - PPSV23) Never done    Shingrix Vaccine Age 50> (2 of 2) 12/19/2018    Foot Exam Q1  07/31/2020    Colorectal Cancer Screening Combo  06/30/2021    Flu Vaccine (1) 09/01/2021    Medicare Yearly Exam  09/10/2021    MICROALBUMIN Q1  09/15/2021    Lipid Screen  09/15/2021    Eye Exam Retinal or Dilated  10/09/2021     3 most recent PHQ Screens 10/11/2021   Little interest or pleasure in doing things Not at all   Feeling down, depressed, irritable, or hopeless Not at all   Total Score PHQ 2 0     Abuse Screening Questionnaire 10/11/2021   Do you ever feel afraid of your partner? N   Are you in a relationship with someone who physically or mentally threatens you? N   Is it safe for you to go home?  Y     Learning Assessment 3/31/2021   PRIMARY LEARNER Patient   HIGHEST LEVEL OF EDUCATION - PRIMARY LEARNER  GRADUATED HIGH SCHOOL OR GED   BARRIERS PRIMARY LEARNER NONE   CO-LEARNER CAREGIVER -   PRIMARY LANGUAGE ENGLISH    NEED -   LEARNER PREFERENCE PRIMARY DEMONSTRATION   LEARNING SPECIAL TOPICS -   ANSWERED BY patient   RELATIONSHIP SELF     Patient-Reported Vitals 10/11/2021   Patient-Reported Weight 202lb

## 2021-10-13 ENCOUNTER — TELEPHONE (OUTPATIENT)
Dept: SURGERY | Age: 71
End: 2021-10-13

## 2021-10-13 ENCOUNTER — TELEPHONE (OUTPATIENT)
Dept: FAMILY MEDICINE CLINIC | Age: 71
End: 2021-10-13

## 2021-10-13 DIAGNOSIS — E11.40 TYPE 2 DIABETES MELLITUS WITH DIABETIC NEUROPATHY, UNSPECIFIED WHETHER LONG TERM INSULIN USE (HCC): Primary | ICD-10-CM

## 2021-10-13 RX ORDER — GABAPENTIN 300 MG/1
300 CAPSULE ORAL 2 TIMES DAILY
Qty: 180 CAPSULE | Refills: 0 | Status: SHIPPED | OUTPATIENT
Start: 2021-10-13 | End: 2022-10-11 | Stop reason: DRUGHIGH

## 2021-10-13 NOTE — TELEPHONE ENCOUNTER
Left message on vm to call office back. LM for patient to call [facility] to [facility] 1st attempt.

## 2021-10-13 NOTE — TELEPHONE ENCOUNTER
Amy Graham MD  Last visit 10/11/2021  Health Maintenance Due   Topic Date Due    COVID-19 Vaccine (1) Never done    Pneumococcal 65+ years (1 of 1 - PPSV23) Never done    Shingrix Vaccine Age 50> (2 of 2) 12/19/2018    Foot Exam Q1  07/31/2020    Colorectal Cancer Screening Combo  06/30/2021    Flu Vaccine (1) 09/01/2021    Medicare Yearly Exam  09/10/2021    MICROALBUMIN Q1  09/15/2021    Lipid Screen  09/15/2021    Eye Exam Retinal or Dilated  10/09/2021     Results for orders placed or performed during the hospital encounter of 37/08/56   METABOLIC PANEL, BASIC   Result Value Ref Range    Sodium 141 136 - 145 mmol/L    Potassium 4.2 3.5 - 5.1 mmol/L    Chloride 107 97 - 108 mmol/L    CO2 28 21 - 32 mmol/L    Anion gap 6 5 - 15 mmol/L    Glucose 119 (H) 65 - 100 mg/dL    BUN 15 6 - 20 MG/DL    Creatinine 1.03 (H) 0.55 - 1.02 MG/DL    BUN/Creatinine ratio 15 12 - 20      GFR est AA >60 >60 ml/min/1.73m2    GFR est non-AA 53 (L) >60 ml/min/1.73m2    Calcium 9.4 8.5 - 10.1 MG/DL   HEMOGLOBIN   Result Value Ref Range    HGB 11.4 (L) 11.5 - 16.0 g/dL   SAMPLES BEING HELD   Result Value Ref Range    SAMPLES BEING HELD 1SST     COMMENT        Add-on orders for these samples will be processed based on acceptable specimen integrity and analyte stability, which may vary by analyte.    METABOLIC PANEL, BASIC   Result Value Ref Range    Sodium 136 136 - 145 mmol/L    Potassium 4.0 3.5 - 5.1 mmol/L    Chloride 102 97 - 108 mmol/L    CO2 29 21 - 32 mmol/L    Anion gap 5 5 - 15 mmol/L    Glucose 91 65 - 100 mg/dL    BUN 15 6 - 20 MG/DL    Creatinine 0.95 0.55 - 1.02 MG/DL    BUN/Creatinine ratio 16 12 - 20      GFR est AA >60 >60 ml/min/1.73m2    GFR est non-AA 58 (L) >60 ml/min/1.73m2    Calcium 9.2 8.5 - 10.1 MG/DL   HEMOGLOBIN   Result Value Ref Range    HGB 10.4 (L) 11.5 - 34.4 g/dL   METABOLIC PANEL, BASIC   Result Value Ref Range    Sodium 136 136 - 145 mmol/L    Potassium 3.8 3.5 - 5.1 mmol/L    Chloride 101 97 - 108 mmol/L    CO2 29 21 - 32 mmol/L    Anion gap 6 5 - 15 mmol/L    Glucose 99 65 - 100 mg/dL    BUN 16 6 - 20 MG/DL    Creatinine 0.82 0.55 - 1.02 MG/DL    BUN/Creatinine ratio 20 12 - 20      GFR est AA >60 >60 ml/min/1.73m2    GFR est non-AA >60 >60 ml/min/1.73m2    Calcium 9.6 8.5 - 10.1 MG/DL   HEMOGLOBIN   Result Value Ref Range    HGB 10.4 (L) 11.5 - 16.0 g/dL   SAMPLES BEING HELD   Result Value Ref Range    SAMPLES BEING HELD 1SST     COMMENT        Add-on orders for these samples will be processed based on acceptable specimen integrity and analyte stability, which may vary by analyte.    GLUCOSE, POC   Result Value Ref Range    Glucose (POC) 93 65 - 100 mg/dL    Performed by Ludwig Gray (PCT)    GLUCOSE, POC   Result Value Ref Range    Glucose (POC) 91 65 - 100 mg/dL    Performed by Ludwig Gray (PCT)    GLUCOSE, POC   Result Value Ref Range    Glucose (POC) 99 65 - 100 mg/dL    Performed by [facility]

## 2022-01-14 ENCOUNTER — TRANSCRIBE ORDER (OUTPATIENT)
Dept: GENERAL RADIOLOGY | Age: 72
End: 2022-01-14

## 2022-01-14 ENCOUNTER — HOSPITAL ENCOUNTER (OUTPATIENT)
Dept: GENERAL RADIOLOGY | Age: 72
Discharge: HOME | End: 2022-01-14
Payer: MEDICARE

## 2022-01-14 DIAGNOSIS — M47.812 CERVICAL SPONDYLOSIS WITHOUT MYELOPATHY: ICD-10-CM

## 2022-01-14 DIAGNOSIS — M47.817 LUMBOSACRAL SPONDYLOSIS WITHOUT MYELOPATHY: ICD-10-CM

## 2022-01-14 DIAGNOSIS — M47.817 LUMBOSACRAL SPONDYLOSIS WITHOUT MYELOPATHY: Primary | ICD-10-CM

## 2022-01-14 PROCEDURE — 72100 X-RAY EXAM L-S SPINE 2/3 VWS: CPT

## 2022-01-14 PROCEDURE — 72050 X-RAY EXAM NECK SPINE 4/5VWS: CPT

## 2022-01-17 NOTE — PROGRESS NOTES
Ammy Davies is a 66 y.o. female who was seen by synchronous (real-time) audio-video technology on 10/11/2021 for Rash (Paitent reports arms and legs rash spreading x1 week. Patient reports topical cream and lotion helping.) and Discuss Medications (medical marijuana)    She was working in the yard and saw poison ivy but was not sure  She dev a rash and then got HC cream  It was spreading at that time when she made this appt  Now it is getting better      She is asking for medical marijuana  She saw Dr Amrita Carmona pain mangmt  She was sent to pain management in 2019  She says she was given shots but that was not helpful              Assessment & Plan:   Diagnoses and all orders for this visit:    1. Poison ivy  Already resolving, no change in care  Continue the [facility]. cream  Come back if gets worse  2. Multiple joint pain  Call pain managmt doc to go back and discuss next steps such as med marijuana if she agrees to that          Subjective:       Prior to Admission medications    Medication Sig Start Date End Date Taking? Authorizing Provider   triamterene-hydroCHLOROthiazide (DYAZIDE) 37.5-25 mg per capsule TAKE 1 CAPSULE EVERY DAY 10/11/21  Yes Lorena Pitt MD   Accu-Chek Guide Glucose Meter misc USE AS DIRECTED 9/21/21  Yes Lorena Pitt MD   lisinopriL (PRINIVIL, ZESTRIL) 10 mg tablet Take 1 Tablet by mouth every other day. 7/12/21  Yes Lorena Pitt MD   lancets (Accu-Chek Softclix Lancets) misc Check  Blood sugar daily E11.9 4/15/21  Yes Lorena Pitt MD   senna-docusate (PERICOLACE) 8.6-50 mg per tablet Take 1 Tab by mouth two (2) times a day. 3/19/21  Yes Gardenia Robbins, NP   gabapentin (NEURONTIN) 300 mg capsule Take 300 mg by mouth two (2) times a day. Yes Provider, Historical   acetaminophen (Tylenol Arthritis Pain) 650 mg TbER Take 1 Tab by mouth every eight (8) hours. 12/21/20  Yes Lorena Pitt MD   VITAMIN D3 2,000 unit cap capsule Take 1 Cap by mouth every morning.  4/27/15  Yes Patient currently admitted to [facility] for weakness d/t Covid infection. Will continue to watch for discharge.   Provider, Historical   naloxone (NARCAN) 4 mg/actuation nasal spray Use 1 spray intranasally, then discard. Repeat with new spray every 2 min as needed for opioid overdose symptoms, alternating nostrils. Patient not taking: Reported on 7/12/2021 3/19/21   Lanny Robbins, NP   psyllium (METAMUCIL) powd Take 1 Scoop by mouth every morning. Patient not taking: Reported on 7/12/2021    Provider, Historical     Patient Active Problem List    Diagnosis Date Noted    Lumbar spinal stenosis 03/17/2021    Lumbar stenosis with neurogenic claudication 03/16/2021    Type 2 diabetes with nephropathy ([facility] [address] 10/31/2018    Severe obesity (BMI 35.0-39. 9) with comorbidity ([facility] [address] 05/08/2018    Fibromyalgia 03/13/2018    Primary osteoarthritis of both knees 03/13/2018    Obesity (BMI 30-39.9) 03/13/2018    Chronic back pain greater than 3 months duration 03/13/2018    Type 2 diabetes mellitus with diabetic neuropathy ([facility] [address] 01/16/2018    Controlled type 2 diabetes mellitus without complication, without long-term current use of insulin ([facility] [address] 11/14/2016    Diabetes mellitus type 2, controlled ([facility] [address] 03/30/2016    Arthritis 05/27/2015    Hypercholesteremia 05/27/2015    Essential hypertension 05/27/2015    Insomnia 05/27/2015    Hx of diabetes mellitus 05/27/2015     Current Outpatient Medications   Medication Sig Dispense Refill    triamterene-hydroCHLOROthiazide (DYAZIDE) 37.5-25 mg per capsule TAKE 1 CAPSULE EVERY DAY 90 Capsule 0    Accu-Chek Guide Glucose Meter [facility] USE AS DIRECTED 1 Each 0    lisinopriL (PRINIVIL, ZESTRIL) 10 mg tablet Take 1 Tablet by mouth every other day. 90 Tablet 1    lancets (Accu-Chek Softclix Lancets) misc Check  Blood sugar daily E11.9 100 Each 3    senna-docusate (PERICOLACE) 8.6-50 mg per tablet Take 1 Tab by mouth two (2) times a day. 60 Tab 0    gabapentin (NEURONTIN) 300 mg capsule Take 300 mg by mouth two (2) times a day.       acetaminophen (Tylenol Arthritis Pain) 650 mg TbER Take 1 Tab by mouth every eight (8) hours. 90 Tab 2    VITAMIN D3 2,000 unit cap capsule Take 1 Cap by mouth every morning.  naloxone (NARCAN) 4 mg/actuation nasal spray Use 1 spray intranasally, then discard. Repeat with new spray every 2 min as needed for opioid overdose symptoms, alternating nostrils. (Patient not taking: Reported on 7/12/2021) 1 Each 0    psyllium (METAMUCIL) powd Take 1 Scoop by mouth every morning.  (Patient not taking: Reported on 7/12/2021)         ROS    Objective:     Patient-Reported Vitals 10/11/2021   Patient-Reported Weight 202lb        [INSTRUCTIONS:  \"[x]\" Indicates a positive item  \"[]\" Indicates a negative item  -- DELETE ALL ITEMS NOT EXAMINED]    Constitutional: [x] Appears well-developed and well-nourished [x] No apparent distress      [] Abnormal -     Mental status: [x] Alert and awake  [x] Oriented to person/place/time [x] Able to follow commands    [] Abnormal -     Eyes:   EOM    [x]  Normal    [] Abnormal -   Sclera  [x]  Normal    [] Abnormal -          Discharge [x]  None visible   [] Abnormal -     HENT: [x] Normocephalic, atraumatic  [] Abnormal -   [x] Mouth/Throat: Mucous membranes are moist    External Ears [x] Normal  [] Abnormal -    Neck: [x] No visualized mass [] Abnormal -     Pulmonary/Chest: [x] Respiratory effort normal   [x] No visualized signs of difficulty breathing or respiratory distress        [] Abnormal -      Musculoskeletal:   [x] Normal gait with no signs of ataxia         [x] Normal range of motion of neck        [] Abnormal -     Neurological:        [x] No Facial Asymmetry (Cranial nerve 7 motor function) (limited exam due to video visit)          [x] No gaze palsy        [] Abnormal -          Skin:        [x] No significant exanthematous lesions or discoloration noted on facial skin         [] Abnormal -            Psychiatric:       [x] Normal Affect [] Abnormal -        [x] No Hallucinations    Other pertinent observable physical exam findings:-        We discussed the expected course, resolution and complications of the diagnosis(es) in detail. Medication risks, benefits, costs, interactions, and alternatives were discussed as indicated. I advised her to contact the office if her condition worsens, changes or fails to improve as anticipated. She expressed understanding with the diagnosis(es) and plan. Kandis Doran, was evaluated through a synchronous (real-time) audio-video encounter. The patient (or guardian if applicable) is aware that this is a billable service. Verbal consent to proceed has been obtained within the past 12 months. The visit was conducted pursuant to the emergency declaration under the [facility], [address] [place] [address] authority and the [employer] and [employer] General Act. Patient identification was verified, and a caregiver was present when appropriate. The patient was located in a state where the provider was credentialed to provide care.       Terri Santiago MD

## 2022-03-04 ENCOUNTER — TRANSCRIBE ORDER (OUTPATIENT)
Dept: SCHEDULING | Age: 72
End: 2022-03-04

## 2022-03-04 DIAGNOSIS — M17.12 PRIMARY OSTEOARTHRITIS OF LEFT KNEE: ICD-10-CM

## 2022-03-04 DIAGNOSIS — M17.11 PRIMARY OSTEOARTHRITIS OF RIGHT KNEE: Primary | ICD-10-CM

## 2022-03-07 ENCOUNTER — TRANSCRIBE ORDER (OUTPATIENT)
Dept: SCHEDULING | Age: 72
End: 2022-03-07

## 2022-03-07 DIAGNOSIS — M17.12 PRIMARY OSTEOARTHRITIS OF LEFT KNEE: ICD-10-CM

## 2022-03-07 DIAGNOSIS — M17.11 PRIMARY OSTEOARTHRITIS OF RIGHT KNEE: Primary | ICD-10-CM

## 2022-03-09 DIAGNOSIS — I10 ESSENTIAL HYPERTENSION: ICD-10-CM

## 2022-03-15 RX ORDER — TRIAMTERENE AND HYDROCHLOROTHIAZIDE 37.5; 25 MG/1; MG/1
CAPSULE ORAL
Qty: 90 CAPSULE | Refills: 0 | Status: SHIPPED | OUTPATIENT
Start: 2022-03-15 | End: 2022-10-19

## 2022-03-18 PROBLEM — E66.9 OBESITY (BMI 30-39.9): Status: ACTIVE | Noted: 2018-03-13

## 2022-03-18 PROBLEM — M79.7 FIBROMYALGIA: Status: ACTIVE | Noted: 2018-03-13

## 2022-03-18 PROBLEM — E66.01 SEVERE OBESITY (BMI 35.0-39.9) WITH COMORBIDITY (HCC): Status: ACTIVE | Noted: 2018-05-08

## 2022-03-19 PROBLEM — G89.29 CHRONIC BACK PAIN GREATER THAN 3 MONTHS DURATION: Status: ACTIVE | Noted: 2018-03-13

## 2022-03-19 PROBLEM — M48.061 LUMBAR SPINAL STENOSIS: Status: ACTIVE | Noted: 2021-03-17

## 2022-03-19 PROBLEM — M17.0 PRIMARY OSTEOARTHRITIS OF BOTH KNEES: Status: ACTIVE | Noted: 2018-03-13

## 2022-03-19 PROBLEM — M54.9 CHRONIC BACK PAIN GREATER THAN 3 MONTHS DURATION: Status: ACTIVE | Noted: 2018-03-13

## 2022-03-19 PROBLEM — E11.21 TYPE 2 DIABETES WITH NEPHROPATHY (HCC): Status: ACTIVE | Noted: 2018-10-31

## 2022-03-19 PROBLEM — E11.40 TYPE 2 DIABETES MELLITUS WITH DIABETIC NEUROPATHY (HCC): Status: ACTIVE | Noted: 2018-01-16

## 2022-03-20 PROBLEM — M48.062 LUMBAR STENOSIS WITH NEUROGENIC CLAUDICATION: Status: ACTIVE | Noted: 2021-03-16

## 2022-03-25 ENCOUNTER — HOSPITAL ENCOUNTER (OUTPATIENT)
Dept: CT IMAGING | Age: 72
Discharge: HOME | End: 2022-03-25
Attending: ORTHOPAEDIC SURGERY
Payer: MEDICARE

## 2022-03-25 DIAGNOSIS — M17.12 PRIMARY OSTEOARTHRITIS OF LEFT KNEE: ICD-10-CM

## 2022-03-25 DIAGNOSIS — M17.11 PRIMARY OSTEOARTHRITIS OF RIGHT KNEE: ICD-10-CM

## 2022-03-25 PROCEDURE — 73700 CT LOWER EXTREMITY W/O DYE: CPT

## 2022-03-31 ENCOUNTER — OFFICE VISIT (OUTPATIENT)
Dept: FAMILY MEDICINE CLINIC | Age: 72
End: 2022-03-31
Payer: MEDICARE

## 2022-03-31 VITALS
BODY MASS INDEX: 37.02 KG/M2 | TEMPERATURE: 98 F | HEIGHT: 65 IN | RESPIRATION RATE: 16 BRPM | DIASTOLIC BLOOD PRESSURE: 78 MMHG | SYSTOLIC BLOOD PRESSURE: 117 MMHG | HEART RATE: 77 BPM | OXYGEN SATURATION: 98 % | WEIGHT: 222.2 LBS

## 2022-03-31 DIAGNOSIS — D50.8 OTHER IRON DEFICIENCY ANEMIA: ICD-10-CM

## 2022-03-31 DIAGNOSIS — Z00.00 MEDICARE ANNUAL WELLNESS VISIT, SUBSEQUENT: Primary | ICD-10-CM

## 2022-03-31 DIAGNOSIS — E66.01 SEVERE OBESITY (BMI 35.0-39.9) WITH COMORBIDITY (HCC): ICD-10-CM

## 2022-03-31 DIAGNOSIS — E78.2 MIXED HYPERLIPIDEMIA: ICD-10-CM

## 2022-03-31 DIAGNOSIS — E11.40 TYPE 2 DIABETES MELLITUS WITH DIABETIC NEUROPATHY, UNSPECIFIED WHETHER LONG TERM INSULIN USE (HCC): ICD-10-CM

## 2022-03-31 DIAGNOSIS — Z12.11 SCREEN FOR COLON CANCER: ICD-10-CM

## 2022-03-31 DIAGNOSIS — I10 ESSENTIAL HYPERTENSION: ICD-10-CM

## 2022-03-31 PROCEDURE — G8427 DOCREV CUR MEDS BY ELIG CLIN: HCPCS | Performed by: FAMILY MEDICINE

## 2022-03-31 PROCEDURE — 3046F HEMOGLOBIN A1C LEVEL >9.0%: CPT | Performed by: FAMILY MEDICINE

## 2022-03-31 PROCEDURE — 3017F COLORECTAL CA SCREEN DOC REV: CPT | Performed by: FAMILY MEDICINE

## 2022-03-31 PROCEDURE — G8752 SYS BP LESS 140: HCPCS | Performed by: FAMILY MEDICINE

## 2022-03-31 PROCEDURE — G8432 DEP SCR NOT DOC, RNG: HCPCS | Performed by: FAMILY MEDICINE

## 2022-03-31 PROCEDURE — G8399 PT W/DXA RESULTS DOCUMENT: HCPCS | Performed by: FAMILY MEDICINE

## 2022-03-31 PROCEDURE — 1101F PT FALLS ASSESS-DOCD LE1/YR: CPT | Performed by: FAMILY MEDICINE

## 2022-03-31 PROCEDURE — G0439 PPPS, SUBSEQ VISIT: HCPCS | Performed by: FAMILY MEDICINE

## 2022-03-31 PROCEDURE — G8417 CALC BMI ABV UP PARAM F/U: HCPCS | Performed by: FAMILY MEDICINE

## 2022-03-31 PROCEDURE — 2022F DILAT RTA XM EVC RTNOPTHY: CPT | Performed by: FAMILY MEDICINE

## 2022-03-31 PROCEDURE — G9899 SCRN MAM PERF RSLTS DOC: HCPCS | Performed by: FAMILY MEDICINE

## 2022-03-31 PROCEDURE — G8536 NO DOC ELDER MAL SCRN: HCPCS | Performed by: FAMILY MEDICINE

## 2022-03-31 PROCEDURE — G8754 DIAS BP LESS 90: HCPCS | Performed by: FAMILY MEDICINE

## 2022-03-31 NOTE — PROGRESS NOTES
Chief Complaint   [facility] with   [facility]     she is a 66y.o. year old female who presents for evalution. Here for medicare wellness' but also needs refills and check on chroinic conditions  No chest paiun or SOB                            Reviewed PmHx, RxHx, FmHx, SocHx, AllgHx and updated and dated in the chart. Aspirin yes ____   No____ N/A____    Patient Active Problem List    Diagnosis    Lumbar spinal stenosis    Lumbar stenosis with neurogenic claudication    Type 2 diabetes with nephropathy ([address]    Severe obesity (BMI 35.0-39. 9) with comorbidity (HCC)    Fibromyalgia    Primary osteoarthritis of both knees    Obesity (BMI 30-39. 9)    Chronic back pain greater than 3 months duration    Type 2 diabetes mellitus with diabetic neuropathy (HCC)    Controlled type 2 diabetes mellitus without complication, without long-term current use of insulin (HCC)    Diabetes mellitus type 2, controlled ([address]    Arthritis    Hypercholesteremia    Essential hypertension    Insomnia    Hx of diabetes mellitus       Nurse notes were reviewed and copied and are correct  Review of Systems - negative except as listed above in the HPI    Objective:     Vitals:    03/31/22 0957   BP: 117/78   Pulse: 77   Resp: 16   Temp: 98 °F (36.7 °C)   TempSrc: Temporal   SpO2: 98%   Weight: 222 lb 3.2 oz (100.8 kg)   Height: 5' 4.8\" (1.646 m)        Physical Examination: General appearance - alert, well appearing, and in no distress  Mental status - alert, oriented to person, place, and time  Eyes - pupils equal and reactive, extraocular eye movements intact    Neck - supple, no significant adenopathy  Chest - clear to auscultation, no wheezes, rales or rhonchi, symmetric air entry  Heart - normal rate, regular rhythm, normal S1, S2, no murmurs, rubs, clicks or gallops  Neurological - alert, oriented, normal speech, no focal findings or movement disorder noted  Musculoskeletal - no joint tenderness, deformity or swelling  Extremities - peripheral pulses normal, no pedal edema, no clubbing or cyanosis  Sensory exam of the foot is normal, tested with the monofilament. Good pulses, no lesions or ulcers, good peripheral pulses. Assessment/ Plan:   Diagnoses and all orders for this visit:    1. Essential hypertension  -     METABOLIC PANEL, COMPREHENSIVE; Future  Dyazide  Lisinopril 10 mg  2. Type 2 diabetes mellitus with diabetic neuropathy, unspecified whether long term insulin use (HCC)  -     METABOLIC PANEL, COMPREHENSIVE; Future  -     HEMOGLOBIN A1C WITH EAG; Future  -     MICROALBUMIN, UR, RAND W/ MICROALB/CREAT RATIO; Future  Not taking any meds  Check a1c  3. Severe obesity (BMI 35.0-39. 9) with comorbidity ([facility] [address]  -     METABOLIC PANEL, COMPREHENSIVE; Future  4. Mixed hyperlipidemia  -     LIPID PANEL; Future  On no meds  5. Other iron deficiency anemia  -     CBC WITH AUTOMATED DIFF; Future           ICD-10-CM ICD-9-CM    1. Medicare annual wellness visit, subsequent  Z00.00 V70.0    2. Severe obesity (BMI 35.0-39. 9) with comorbidity ([facility] [address]  S46.43 489.85 METABOLIC PANEL, COMPREHENSIVE   3. Essential hypertension  S21 142.1 METABOLIC PANEL, COMPREHENSIVE   4. Type 2 diabetes mellitus with diabetic neuropathy, unspecified whether long term insulin use (HCC)  J14.61 892.70 METABOLIC PANEL, COMPREHENSIVE     357.2 HEMOGLOBIN A1C WITH EAG      MICROALBUMIN, UR, RAND W/ MICROALB/CREAT RATIO   5. Mixed hyperlipidemia  E78.2 272.2 LIPID PANEL   6. Other iron deficiency anemia  D50.8 280.8 CBC WITH AUTOMATED DIFF       I have discussed the diagnosis with the patient and the intended plan as seen in the above orders. The patient has received an after-visit summary  [facility] and questions were answered concerning future plans.  Patients in [facility] have access to avs without printing    Medication Side Effects and Warnings were discussed with patient:   Patient Labs were reviewed and or requested:   Patient Past Records were reviewed and or requested: yes        Patient Instructions       Medicare Wellness Visit, Female     The best way to live healthy is to have a lifestyle where you eat a well-balanced diet, exercise regularly, limit alcohol use, and quit all forms of tobacco/nicotine, if applicable. Regular preventive services are another way to keep healthy. Preventive services (vaccines, screening tests, monitoring & exams) can help personalize your care plan, which helps you manage your own care. Screening tests can find health problems at the earliest stages, when they are easiest to treat. [facility] follows the current, evidence-based guidelines published by the [facility] [employer] ([facility]) when recommending preventive services for our patients. Because we follow these guidelines, sometimes recommendations change over time as research supports it. (For example, mammograms used to be recommended annually. Even though Medicare will still pay for an annual mammogram, the newer guidelines recommend a mammogram every two years for women of average risk). Of course, you and your doctor may decide to screen more often for some diseases, based on your risk and your co-morbidities (chronic disease you are already diagnosed with). Preventive services for you include:  - Medicare offers their members a free annual wellness visit, which is time for you and your primary care provider to discuss and plan for your preventive service needs. Take advantage of this benefit every year!  -All adults over the age of 72 should receive the recommended pneumonia vaccines. Current USPSTF guidelines recommend a series of two vaccines for the best pneumonia protection.   -All adults should have a flu vaccine yearly and a tetanus vaccine every 10 years.   -All adults age 48 and older should receive the shingles vaccines (series of two vaccines).       -All adults age 38-68 who are overweight should have a diabetes screening test once every three years.   -All adults born between [birth year] and 1965 should be screened once for Hepatitis C.  -Other screening tests and preventive services for persons with diabetes include: an eye exam to screen for diabetic retinopathy, a kidney function test, a foot exam, and stricter control over your cholesterol.   -Cardiovascular screening for adults with routine risk involves an electrocardiogram (ECG) at intervals determined by your doctor.   -Colorectal cancer screenings should be done for adults age 54-65 with no increased risk factors for colorectal cancer. There are a number of acceptable methods of screening for this type of cancer. Each test has its own benefits and drawbacks. Discuss with your doctor what is most appropriate for you during your annual wellness visit. The different tests include: colonoscopy (considered the best screening method), a fecal occult blood test, a fecal DNA test, and sigmoidoscopy.    -A bone mass density test is recommended when a woman turns 65 to screen for osteoporosis. This test is only recommended one time, as a screening. Some providers will use this same test as a disease monitoring tool if you already have osteoporosis. -Breast cancer screenings are recommended every other year for women of normal risk, age 54-69.  -Cervical cancer screenings for women over age 72 are only recommended with certain risk factors.      Here is a list of your current Health Maintenance items (your personalized list of preventive services) with a due date:  Health Maintenance Due   Topic Date Due    COVID-19 Vaccine (1) Never done    Pneumococcal Vaccine (1 of 1 - PPSV23) Never done    Shingles Vaccine (2 of 2) 12/19/2018    Diabetic Foot Care  07/31/2020    Colorectal Screening  06/30/2021    Yearly Flu Vaccine (1) 09/01/2021    Albumin Urine Test  09/15/2021    Cholesterol Test   09/15/2021    Eye Exam  10/09/2021    Hemoglobin A1C    03/04/2022             This is the Subsequent Medicare Annual Wellness Exam, performed 12 months or more after the Initial AWV or the last Subsequent AWV    I have reviewed the patient's medical history in detail and updated the computerized patient record. Assessment/Plan   Education and counseling provided:  Are appropriate based on today's review and evaluation    1. Medicare annual wellness visit, subsequent  2. Severe obesity (BMI 35.0-39. 9) with comorbidity ([address]  -     METABOLIC PANEL, COMPREHENSIVE; Future  3. Essential hypertension  -     METABOLIC PANEL, COMPREHENSIVE; Future  4. Type 2 diabetes mellitus with diabetic neuropathy, unspecified whether long term insulin use (HCC)  -     METABOLIC PANEL, COMPREHENSIVE; Future  -     HEMOGLOBIN A1C WITH EAG; Future  -     MICROALBUMIN, UR, RAND W/ MICROALB/CREAT RATIO; Future  5. Mixed hyperlipidemia  -     LIPID PANEL; Future  6. Other iron deficiency anemia  -     CBC WITH AUTOMATED DIFF; Future       Depression Risk Factor Screening     3 most recent PHQ Screens 10/11/2021   Little interest or pleasure in doing things Not at all   Feeling down, depressed, irritable, or hopeless Not at all   Total Score PHQ 2 0       Alcohol & Drug Abuse Risk Screen    Do you average more than 1 drink per night or more than 7 drinks a week:  No    On any one occasion in the past three months have you have had more than 3 drinks containing alcohol:  No          Functional Ability and Level of Safety    Hearing: Hearing is good. Activities of Daily Living: The home contains: no safety equipment. Patient does total self care      Ambulation: with no difficulty     Fall Risk:  Fall Risk Assessment, last 12 mths 10/11/2021   Able to walk? Yes   Fall in past 12 months? 0   Do you feel unsteady?  0   Are you worried about falling 0      Abuse Screen:  Patient is not abused       Cognitive Screening    Has your family/caregiver stated any concerns about your memory: no     Cognitive Screening: Normal - Clock Drawing Test    Health Maintenance Due     Health Maintenance Due   Topic Date Due    COVID-19 Vaccine (1) Never done    Pneumococcal 65+ years (1 of 1 - PPSV23) Never done    Shingrix Vaccine Age 50> (2 of 2) 12/19/2018    Foot Exam Q1  07/31/2020    Colorectal Cancer Screening Combo  06/30/2021    Flu Vaccine (1) 09/01/2021    MICROALBUMIN Q1  09/15/2021    Lipid Screen  09/15/2021    Eye Exam Retinal or Dilated  10/09/2021    A1C test (Diabetic or Prediabetic)  03/04/2022       Patient Care Team   Patient Care Team:  Mimi Bennett MD as PCP - General (Family Medicine)  Mimi Bennett MD as PCP - [facility] Provider    History     Patient Active Problem List   Diagnosis Code    Arthritis M19.90    Hypercholesteremia E78.00    Essential hypertension I10    Insomnia G47.00    Hx of diabetes mellitus Z86.39    Diabetes mellitus type 2, controlled ([address] E11.9    Controlled type 2 diabetes mellitus without complication, without long-term current use of insulin ([address] E11.9    Type 2 diabetes mellitus with diabetic neuropathy ([address] E11.40    Fibromyalgia M79.7    Primary osteoarthritis of both knees M17.0    Obesity (BMI 30-39. 9) E66.9    Chronic back pain greater than 3 months duration M54.9, G89.29    Severe obesity (BMI 35.0-39. 9) with comorbidity ([address] E66.01    Type 2 diabetes with nephropathy ([address] E11.21    Lumbar stenosis with neurogenic claudication M48.062    Lumbar spinal stenosis M48.061     Past Medical History:   Diagnosis Date    Hypertension     Other and unspecified hyperlipidemia     Pre-diabetes     Unspecified vitamin D deficiency       Past Surgical History:   Procedure Laterality Date    HX HIP REPLACEMENT Right 05/04/2020    HX HYSTERECTOMY  1983     Current Outpatient Medications   Medication Sig Dispense Refill    triamterene-hydroCHLOROthiazide (DYAZIDE) 37.5-25 mg per capsule TAKE 1 CAPSULE EVERY DAY 90 Capsule 0    gabapentin (NEURONTIN) 300 mg capsule Take 1 Capsule by mouth two (2) times a day. Max Daily Amount: 600 mg. 180 Capsule 0    Accu-Chek Guide Glucose Meter misc USE AS DIRECTED 1 Each 0    lisinopriL (PRINIVIL, ZESTRIL) 10 mg tablet Take 1 Tablet by mouth every other day. 90 Tablet 1    lancets (Accu-Chek Softclix Lancets) misc Check  Blood sugar daily E11.9 100 Each 3    senna-docusate (PERICOLACE) 8.6-50 mg per tablet Take 1 Tab by mouth two (2) times a day. 60 Tab 0    acetaminophen (Tylenol Arthritis Pain) 650 mg TbER Take 1 Tab by mouth every eight (8) hours. 90 Tab 2    VITAMIN D3 2,000 unit cap capsule Take 1 Cap by mouth every morning.  naloxone (NARCAN) 4 mg/actuation nasal spray Use 1 spray intranasally, then discard. Repeat with new spray every 2 min as needed for opioid overdose symptoms, alternating nostrils. (Patient not taking: Reported on 2021) 1 Each 0    psyllium (METAMUCIL) powd Take 1 Scoop by mouth every morning.  (Patient not taking: Reported on 2021)       Allergies   Allergen Reactions    Codeine Other (comments)     \"Didn't like the way it made me feel\"    Pcn [Penicillins] Itching     TOLERATED ANCEF GRADED CHALLENGE 3/16/2021 WITH NO ADVERSE REACTIONS    Iodine Unknown (comments)     Pt unknown of reaction stated it was years ago      Lipitor [Atorvastatin] Myalgia       Family History   Problem Relation Age of Onset    Cancer Other         breast    No Known Problems Sister     No Known Problems Brother     No Known Problems Sister     No Known Problems Brother     Anesth Problems Neg Hx      Social History     Tobacco Use    Smoking status: Former Smoker     Types: Cigarettes     Quit date: 2015     Years since quittin.1    Smokeless tobacco: Never Used   Substance Use Topics    Alcohol use: Not Currently     Comment: christen Dixon MD

## 2022-03-31 NOTE — PATIENT INSTRUCTIONS
Medicare Wellness Visit, Female     The best way to live healthy is to have a lifestyle where you eat a well-balanced diet, exercise regularly, limit alcohol use, and quit all forms of tobacco/nicotine, if applicable. Regular preventive services are another way to keep healthy. Preventive services (vaccines, screening tests, monitoring & exams) can help personalize your care plan, which helps you manage your own care. Screening tests can find health problems at the earliest stages, when they are easiest to treat. [facility] follows the current, evidence-based guidelines published by the [facility] ([facility]) when recommending preventive services for our patients. Because we follow these guidelines, sometimes recommendations change over time as research supports it. (For example, mammograms used to be recommended annually. Even though Medicare will still pay for an annual mammogram, the newer guidelines recommend a mammogram every two years for women of average risk). Of course, you and your doctor may decide to screen more often for some diseases, based on your risk and your co-morbidities (chronic disease you are already diagnosed with). Preventive services for you include:  - Medicare offers their members a free annual wellness visit, which is time for you and your primary care provider to discuss and plan for your preventive service needs. Take advantage of this benefit every year!  -All adults over the age of 72 should receive the recommended pneumonia vaccines. Current USPSTF guidelines recommend a series of two vaccines for the best pneumonia protection.   -All adults should have a flu vaccine yearly and a tetanus vaccine every 10 years.   -All adults age 48 and older should receive the shingles vaccines (series of two vaccines).       -All adults age 38-68 who are overweight should have a diabetes screening test once every three years.   -All adults born between [birth year] and 1966 should be screened once for Hepatitis C.  -Other screening tests and preventive services for persons with diabetes include: an eye exam to screen for diabetic retinopathy, a kidney function test, a foot exam, and stricter control over your cholesterol.   -Cardiovascular screening for adults with routine risk involves an electrocardiogram (ECG) at intervals determined by your doctor.   -Colorectal cancer screenings should be done for adults age 54-65 with no increased risk factors for colorectal cancer. There are a number of acceptable methods of screening for this type of cancer. Each test has its own benefits and drawbacks. Discuss with your doctor what is most appropriate for you during your annual wellness visit. The different tests include: colonoscopy (considered the best screening method), a fecal occult blood test, a fecal DNA test, and sigmoidoscopy.    -A bone mass density test is recommended when a woman turns 65 to screen for osteoporosis. This test is only recommended one time, as a screening. Some providers will use this same test as a disease monitoring tool if you already have osteoporosis. -Breast cancer screenings are recommended every other year for women of normal risk, age 54-69.  -Cervical cancer screenings for women over age 72 are only recommended with certain risk factors.      Here is a list of your current Health Maintenance items (your personalized list of preventive services) with a due date:  Health Maintenance Due   Topic Date Due    COVID-19 Vaccine (1) Never done    Pneumococcal Vaccine (1 of 1 - PPSV23) Never done    Shingles Vaccine (2 of 2) 12/19/2018    Diabetic Foot Care  07/31/2020    Colorectal Screening  06/30/2021    Yearly Flu Vaccine (1) 09/01/2021    Albumin Urine Test  09/15/2021    Cholesterol Test   09/15/2021    Eye Exam  10/09/2021    Hemoglobin A1C    03/04/2022

## 2022-03-31 NOTE — PROGRESS NOTES
Identified pt with two pt identifiers(name and ). Reviewed record in preparation for visit and have obtained necessary documentation. Chief Complaint   Patient presents with   [facility] Due   Topic    COVID-19 Vaccine (1)    Pneumococcal 65+ years (1 of 1 - PPSV23)    Shingrix Vaccine Age 50> (2 of 2)    Foot Exam Q1     Colorectal Cancer Screening Combo     Flu Vaccine (1)    Medicare Yearly Exam     MICROALBUMIN Q1     Lipid Screen     Eye Exam Retinal or Dilated     A1C test (Diabetic or Prediabetic)        Coordination of Care Questionnaire:  :   1) Have you been to an emergency room, urgent care, or hospitalized since your last visit? If yes, where when, and reason for visit? No      2. Have seen or consulted any other health care provider since your last visit? If yes, where when, and reason for visit?  no        Patient is accompanied by self I have received verbal consent from Diana Torres to discuss any/all medical information while they are present in the room.

## 2022-03-31 NOTE — ACP (ADVANCE CARE PLANNING)
Advance Care Planning     Advance Care Planning (ACP) Physician/NP/PA Conversation      Date of Conversation: 3/31/2022  Conducted with: Patient with Decision Making Capacity    Healthcare Decision Maker:   No healthcare decision makers have been documented. Click here to complete [address] including selection of the Healthcare Decision Maker Relationship (ie \"Primary\")        Jitendra Barrios daughter) [phone]  Care Preferences:    Hospitalization: \"If your health worsens and it becomes clear that your chance of recovery is unlikely, what would be your preference regarding hospitalization? \"  The patient would prefer comfort-focused treatment without hospitalization. Ventilation: \"If you were unable to breathe on your own and your chance of recovery was unlikely, what would be your preference about the use of a ventilator (breathing machine) if it was available to you? \"   The patient would NOT desire the use of a ventilator. Resuscitation: \"In the event your heart stopped as a result of an underlying serious health condition, would you want attempts to be made to restart your heart, or would you prefer a natural death? \"   No, do NOT attempt to resuscitate.     Additional topics discussed: treatment goals, ventilation preferences, hospitalization preferences and resuscitation preferences    Conversation Outcomes / Follow-Up Plan:   ACP complete - no further action today  Reviewed DNR/DNI and patient confirms current DNR status - completed forms on file (place new order if needed)   Only in case of severe terminal illness that is unlikely to improve  Length of Voluntary ACP Conversation in minutes:  16 minutes    Roberta Wilkinson MD

## 2022-04-04 LAB
ALBUMIN SERPL-MCNC: 3.5 G/DL (ref 3.5–5)
ALBUMIN/GLOB SERPL: 0.8 {RATIO} (ref 1.1–2.2)
ALP SERPL-CCNC: 90 U/L (ref 45–117)
ALT SERPL-CCNC: 19 U/L (ref 12–78)
ANION GAP SERPL CALC-SCNC: 2 MMOL/L (ref 5–15)
AST SERPL-CCNC: 15 U/L (ref 15–37)
BASOPHILS # BLD: 0 K/UL (ref 0–0.1)
BASOPHILS NFR BLD: 0 % (ref 0–1)
BILIRUB SERPL-MCNC: 0.3 MG/DL (ref 0.2–1)
BUN SERPL-MCNC: 10 MG/DL (ref 6–20)
BUN/CREAT SERPL: 11 (ref 12–20)
CALCIUM SERPL-MCNC: 9.3 MG/DL (ref 8.5–10.1)
CHLORIDE SERPL-SCNC: 104 MMOL/L (ref 97–108)
CHOLEST SERPL-MCNC: 217 MG/DL
CO2 SERPL-SCNC: 32 MMOL/L (ref 21–32)
CREAT SERPL-MCNC: 0.92 MG/DL (ref 0.55–1.02)
CREAT UR-MCNC: 79.2 MG/DL
DIFFERENTIAL METHOD BLD: NORMAL
EOSINOPHIL # BLD: 0.3 K/UL (ref 0–0.4)
EOSINOPHIL NFR BLD: 4 % (ref 0–7)
ERYTHROCYTE [DISTWIDTH] IN BLOOD BY AUTOMATED COUNT: 14.3 % (ref 11.5–14.5)
EST. AVERAGE GLUCOSE BLD GHB EST-MCNC: 134 MG/DL
GLOBULIN SER CALC-MCNC: 4.4 G/DL (ref 2–4)
GLUCOSE SERPL-MCNC: 95 MG/DL (ref 65–100)
HBA1C MFR BLD: 6.3 % (ref 4–5.6)
HCT VFR BLD AUTO: 38.8 % (ref 35–47)
HDLC SERPL-MCNC: 53 MG/DL
HDLC SERPL: 4.1 {RATIO} (ref 0–5)
HGB BLD-MCNC: 11.8 G/DL (ref 11.5–16)
IMM GRANULOCYTES # BLD AUTO: 0 K/UL (ref 0–0.04)
IMM GRANULOCYTES NFR BLD AUTO: 0 % (ref 0–0.5)
LDLC SERPL CALC-MCNC: 146.8 MG/DL (ref 0–100)
LYMPHOCYTES # BLD: 3.5 K/UL (ref 0.8–3.5)
LYMPHOCYTES NFR BLD: 41 % (ref 12–49)
MCH RBC QN AUTO: 28 PG (ref 26–34)
MCHC RBC AUTO-ENTMCNC: 30.4 G/DL (ref 30–36.5)
MCV RBC AUTO: 91.9 FL (ref 80–99)
MICROALBUMIN UR-MCNC: <0.5 MG/DL
MICROALBUMIN/CREAT UR-RTO: <6 MG/G (ref 0–30)
MONOCYTES # BLD: 0.5 K/UL (ref 0–1)
MONOCYTES NFR BLD: 5 % (ref 5–13)
NEUTS SEG # BLD: 4.3 K/UL (ref 1.8–8)
NEUTS SEG NFR BLD: 50 % (ref 32–75)
NRBC # BLD: 0 K/UL (ref 0–0.01)
NRBC BLD-RTO: 0 PER 100 WBC
PLATELET # BLD AUTO: 354 K/UL (ref 150–400)
PMV BLD AUTO: 9.9 FL (ref 8.9–12.9)
POTASSIUM SERPL-SCNC: 4.2 MMOL/L (ref 3.5–5.1)
PROT SERPL-MCNC: 7.9 G/DL (ref 6.4–8.2)
RBC # BLD AUTO: 4.22 M/UL (ref 3.8–5.2)
SODIUM SERPL-SCNC: 138 MMOL/L (ref 136–145)
TRIGL SERPL-MCNC: 86 MG/DL (ref ?–150)
VLDLC SERPL CALC-MCNC: 17.2 MG/DL
WBC # BLD AUTO: 8.7 K/UL (ref 3.6–11)

## 2022-04-11 NOTE — PROGRESS NOTES
The blood sugar is still in the prediabetes range  Your cholesterol numbers are not at goal. To provide you with the best heart health the LDL should be under 100 if you have no heart disease or history of prediabetes or diabetes. If you have a history of prediabetes or diabetes or heart disease the LDL goal should be less than 70. Avoid fried food, fast food and junk food. Also move more each day. aim for at least 150 minutes of activity each week.  I want to recheck the cholesterol levels in three months   You are not anemic  The liver and kidney tests are normal

## 2022-04-13 NOTE — PROGRESS NOTES
Left message on vm to call office back. Result letter sent via mail to address on file. No active my chart.

## 2022-05-31 NOTE — PERIOP NOTES
ROYA marks at Dr. Wetzel Nissen office. We are able to see patient 6/1 in PAT, but do not have any H&P slots open this week.

## 2022-06-01 ENCOUNTER — HOSPITAL ENCOUNTER (OUTPATIENT)
Dept: PREADMISSION TESTING | Age: 72
Discharge: HOME | End: 2022-06-01
Payer: MEDICARE

## 2022-06-01 VITALS
RESPIRATION RATE: 18 BRPM | OXYGEN SATURATION: 97 % | DIASTOLIC BLOOD PRESSURE: 63 MMHG | HEIGHT: 67 IN | SYSTOLIC BLOOD PRESSURE: 119 MMHG | TEMPERATURE: 97.5 F | HEART RATE: 70 BPM | WEIGHT: 211 LBS | BODY MASS INDEX: 33.12 KG/M2

## 2022-06-01 LAB
ABO + RH BLD: NORMAL
ALBUMIN SERPL-MCNC: 3.7 G/DL (ref 3.5–5)
ALBUMIN/GLOB SERPL: 0.8 {RATIO} (ref 1.1–2.2)
ALP SERPL-CCNC: 90 U/L (ref 45–117)
ALT SERPL-CCNC: 20 U/L (ref 12–78)
ANION GAP SERPL CALC-SCNC: 6 MMOL/L (ref 5–15)
APPEARANCE UR: CLEAR
AST SERPL-CCNC: 21 U/L (ref 15–37)
ATRIAL RATE: 67 BPM
BACTERIA URNS QL MICRO: NEGATIVE /HPF
BASOPHILS # BLD: 0.1 K/UL (ref 0–0.1)
BASOPHILS NFR BLD: 1 % (ref 0–1)
BILIRUB SERPL-MCNC: 0.5 MG/DL (ref 0.2–1)
BILIRUB UR QL: NEGATIVE
BLOOD GROUP ANTIBODIES SERPL: NORMAL
BUN SERPL-MCNC: 30 MG/DL (ref 6–20)
BUN/CREAT SERPL: 21 (ref 12–20)
CALCIUM SERPL-MCNC: 10 MG/DL (ref 8.5–10.1)
CALCULATED P AXIS, ECG09: 75 DEGREES
CALCULATED R AXIS, ECG10: 15 DEGREES
CALCULATED T AXIS, ECG11: 12 DEGREES
CHLORIDE SERPL-SCNC: 107 MMOL/L (ref 97–108)
CO2 SERPL-SCNC: 28 MMOL/L (ref 21–32)
COLOR UR: ABNORMAL
CREAT SERPL-MCNC: 1.4 MG/DL (ref 0.55–1.02)
CRP SERPL-MCNC: 0.46 MG/DL (ref 0–0.6)
DIAGNOSIS, 93000: NORMAL
DIFFERENTIAL METHOD BLD: ABNORMAL
EOSINOPHIL # BLD: 0.3 K/UL (ref 0–0.4)
EOSINOPHIL NFR BLD: 3 % (ref 0–7)
EPITH CASTS URNS QL MICRO: ABNORMAL /LPF
ERYTHROCYTE [DISTWIDTH] IN BLOOD BY AUTOMATED COUNT: 14.7 % (ref 11.5–14.5)
ERYTHROCYTE [SEDIMENTATION RATE] IN BLOOD: 43 MM/HR (ref 0–30)
EST. AVERAGE GLUCOSE BLD GHB EST-MCNC: 128 MG/DL
GLOBULIN SER CALC-MCNC: 4.7 G/DL (ref 2–4)
GLUCOSE SERPL-MCNC: 97 MG/DL (ref 65–100)
GLUCOSE UR STRIP.AUTO-MCNC: NEGATIVE MG/DL
HBA1C MFR BLD: 6.1 % (ref 4–5.6)
HCT VFR BLD AUTO: 39.6 % (ref 35–47)
HGB BLD-MCNC: 12.2 G/DL (ref 11.5–16)
HGB UR QL STRIP: NEGATIVE
HYALINE CASTS URNS QL MICRO: ABNORMAL /LPF (ref 0–2)
IMM GRANULOCYTES # BLD AUTO: 0 K/UL (ref 0–0.04)
IMM GRANULOCYTES NFR BLD AUTO: 0 % (ref 0–0.5)
KETONES UR QL STRIP.AUTO: ABNORMAL MG/DL
LEUKOCYTE ESTERASE UR QL STRIP.AUTO: NEGATIVE
LYMPHOCYTES # BLD: 3.6 K/UL (ref 0.8–3.5)
LYMPHOCYTES NFR BLD: 38 % (ref 12–49)
MCH RBC QN AUTO: 28 PG (ref 26–34)
MCHC RBC AUTO-ENTMCNC: 30.8 G/DL (ref 30–36.5)
MCV RBC AUTO: 90.8 FL (ref 80–99)
MONOCYTES # BLD: 0.7 K/UL (ref 0–1)
MONOCYTES NFR BLD: 7 % (ref 5–13)
NEUTS SEG # BLD: 5 K/UL (ref 1.8–8)
NEUTS SEG NFR BLD: 51 % (ref 32–75)
NITRITE UR QL STRIP.AUTO: NEGATIVE
NRBC # BLD: 0 K/UL (ref 0–0.01)
NRBC BLD-RTO: 0 PER 100 WBC
P-R INTERVAL, ECG05: 142 MS
PH UR STRIP: 5.5 [PH] (ref 5–8)
PLATELET # BLD AUTO: 366 K/UL (ref 150–400)
PMV BLD AUTO: 10 FL (ref 8.9–12.9)
POTASSIUM SERPL-SCNC: 3.9 MMOL/L (ref 3.5–5.1)
PROT SERPL-MCNC: 8.4 G/DL (ref 6.4–8.2)
PROT UR STRIP-MCNC: NEGATIVE MG/DL
Q-T INTERVAL, ECG07: 376 MS
QRS DURATION, ECG06: 80 MS
QTC CALCULATION (BEZET), ECG08: 397 MS
RBC # BLD AUTO: 4.36 M/UL (ref 3.8–5.2)
RBC #/AREA URNS HPF: ABNORMAL /HPF (ref 0–5)
SODIUM SERPL-SCNC: 141 MMOL/L (ref 136–145)
SP GR UR REFRACTOMETRY: 1.02 (ref 1–1.03)
SPECIMEN EXP DATE BLD: NORMAL
UA: UC IF INDICATED,UAUC: ABNORMAL
UROBILINOGEN UR QL STRIP.AUTO: 1 EU/DL (ref 0.2–1)
VENTRICULAR RATE, ECG03: 67 BPM
WBC # BLD AUTO: 9.6 K/UL (ref 3.6–11)
WBC URNS QL MICRO: ABNORMAL /HPF (ref 0–4)

## 2022-06-01 PROCEDURE — 36415 COLL VENOUS BLD VENIPUNCTURE: CPT

## 2022-06-01 PROCEDURE — 81001 URINALYSIS AUTO W/SCOPE: CPT

## 2022-06-01 PROCEDURE — 86900 BLOOD TYPING SEROLOGIC ABO: CPT

## 2022-06-01 PROCEDURE — 83036 HEMOGLOBIN GLYCOSYLATED A1C: CPT

## 2022-06-01 PROCEDURE — 80053 COMPREHEN METABOLIC PANEL: CPT

## 2022-06-01 PROCEDURE — 86140 C-REACTIVE PROTEIN: CPT

## 2022-06-01 PROCEDURE — 85652 RBC SED RATE AUTOMATED: CPT

## 2022-06-01 PROCEDURE — 93005 ELECTROCARDIOGRAM TRACING: CPT

## 2022-06-01 PROCEDURE — 85025 COMPLETE CBC W/AUTO DIFF WBC: CPT

## 2022-06-01 RX ORDER — TRAMADOL HYDROCHLORIDE 50 MG/1
50 TABLET ORAL
COMMUNITY
End: 2022-06-07

## 2022-06-01 NOTE — PERIOP NOTES
Patient states that she does not know if she will have any help at home on post op day 1 and following. [address] at Dr. Tony Douglass office; notified Wali Olson that patient states she unsure about help at home post op.

## 2022-06-01 NOTE — PERIOP NOTES
[address] [address] [facility]   MAIN OR                                  [phone]   MAIN PRE OP                          [phone]                                                                                AMBULATORY PRE OP          [phone]  PRE-ADMISSION TESTING    [phone]   Surgery Date:  6/6/2022*       Is surgery arrival time given by surgeon? NO  If NO, [facility] staff will call you between 3 and 7pm the day before your surgery with your arrival time. (If your surgery is on a Monday, we will call you the Friday before.)    Call [phone] after 7pm Monday-Friday if you did not receive this call. INSTRUCTIONS BEFORE YOUR SURGERY   When You  Arrive Arrive at the 2nd [address] [facility] on the day of your surgery  Have your insurance card, photo ID, and any copayment (if needed)   Food   and   Drink NO food or drink after midnight the night before surgery    This means NO water, gum, mints, coffee, juice, etc.  No alcohol (beer, wine, liquor) 24 hours before and after surgery   Medications to   TAKE   Morning of Surgery MEDICATIONS TO TAKE THE MORNING OF SURGERY WITH A SIP OF WATER:    Tylenol if needed   Tramadol if needed   Gabapentin   Medications  To  STOP      7 days before surgery  Non-Steroidal anti-inflammatory Drugs (NSAID's): for example, Ibuprofen (Advil, Motrin), Naproxen (Aleve)   Aspirin, if taking for pain    Herbal supplements, vitamins, and fish oil  (Pain medications not listed above, including Tylenol may be taken)   Blood  Thinners  If you take  Aspirin, Plavix, Coumadin, or any blood-thinning or anti-blood clot medicine, talk to the doctor who prescribed the medications for pre-operative instructions.    Bathing Clothing  Jewelry  Valuables      If you shower the morning of surgery, please do not apply anything to your skin (lotions, powders, deodorant, or makeup, especially niecy)   Follow Chlorhexidine Care Fusion body wash instructions provided to you during PAT appointment. Begin 3 days prior to surgery.  Do not shave or trim anywhere 24 hours before surgery   Wear your hair loose or down; no pony-tails, buns, or metal hair clips   Wear loose, comfortable, clean clothes   Wear glasses instead of contacts  [employer] money, valuables, and jewelry, including body piercings, at home   If you were given an [email or web address], bring it on day of surgery. Removed nail polish and artificial nails from hands and feet. Going Home - or Spending the Night  SAME-DAY SURGERY: You must have a responsible adult drive you home and stay with you 24 hours after surgery   ADMITS: If your doctor is keeping you in the hospital after surgery, leave personal belongings/luggage in your car until you have a hospital room number. Hospital discharge time is 12 noon  Drivers must be here before 12 noon unless you are told differently   Special Instructions   Special Instructions:  · Use Chlorhexidine Care Fusion wash and sponges 3 days prior to surgery as instructed. · Incentive spirometer given with instructions to practice at home and bring back to the hospital on the day of surgery. · [facility] will contact you if your Hemoglobin A1C is greater than 7.5. · Ensure/Glucerna  sample, nutritional information, and Ensure/Glucerna coupon given. · Pain pamphlet and Call Don't Fall reminder reviewed with patient. ·  parking is complimentary Monday - Friday 7 am - 5 pm  · Bring PTA Medication list day of surgery with the last doses taken documented       Follow all instructions so your surgery wont be cancelled. Please, be on time. If a situation occurs and you are delayed the day of surgery, call  [phone]. If your physical condition changes (like a fever, cold, flu, etc.) call your surgeon.     Home medication(s) reviewed and verified via LIST and  VERBAL   during PAT appointment. The patient was contacted  IN-PERSON  The patient verbalizes understanding of all instructions and   DOES NOT   need reinforcement.

## 2022-06-02 LAB
BACTERIA SPEC CULT: NORMAL
BACTERIA SPEC CULT: NORMAL
SERVICE CMNT-IMP: NORMAL

## 2022-06-03 ENCOUNTER — ANESTHESIA EVENT (OUTPATIENT)
Dept: SURGERY | Age: 72
End: 2022-06-03
Payer: MEDICARE

## 2022-06-05 RX ORDER — ASPIRIN 81 MG/1
81 TABLET ORAL 2 TIMES DAILY
Status: CANCELLED | OUTPATIENT
Start: 2022-06-05

## 2022-06-06 ENCOUNTER — ANESTHESIA (OUTPATIENT)
Dept: SURGERY | Age: 72
End: 2022-06-06
Payer: MEDICARE

## 2022-06-06 ENCOUNTER — APPOINTMENT (OUTPATIENT)
Dept: GENERAL RADIOLOGY | Age: 72
End: 2022-06-06
Attending: PHYSICIAN ASSISTANT
Payer: MEDICARE

## 2022-06-06 ENCOUNTER — HOSPITAL ENCOUNTER (OUTPATIENT)
Age: 72
Setting detail: OBSERVATION
Discharge: HOME | End: 2022-06-07
Attending: ORTHOPAEDIC SURGERY | Admitting: ORTHOPAEDIC SURGERY
Payer: MEDICARE

## 2022-06-06 DIAGNOSIS — M17.0 PRIMARY OSTEOARTHRITIS OF BOTH KNEES: Primary | ICD-10-CM

## 2022-06-06 PROBLEM — M17.12 PRIMARY OSTEOARTHRITIS OF LEFT KNEE: Status: ACTIVE | Noted: 2022-06-06

## 2022-06-06 PROCEDURE — G0378 HOSPITAL OBSERVATION PER HR: HCPCS

## 2022-06-06 PROCEDURE — C1776 JOINT DEVICE (IMPLANTABLE): HCPCS | Performed by: ORTHOPAEDIC SURGERY

## 2022-06-06 PROCEDURE — 74011250636 HC RX REV CODE- 250/636: Performed by: ANESTHESIOLOGY

## 2022-06-06 PROCEDURE — 74011000258 HC RX REV CODE- 258: Performed by: ANESTHESIOLOGY

## 2022-06-06 PROCEDURE — 77030038149 HC BLD SAW SAG STRY -D: Performed by: ORTHOPAEDIC SURGERY

## 2022-06-06 PROCEDURE — 74011000250 HC RX REV CODE- 250: Performed by: ANESTHESIOLOGY

## 2022-06-06 PROCEDURE — 77030040393 HC DRSG OPTIFOAM GENT MDII -B: Performed by: ORTHOPAEDIC SURGERY

## 2022-06-06 PROCEDURE — 74011250637 HC RX REV CODE- 250/637: Performed by: ANESTHESIOLOGY

## 2022-06-06 PROCEDURE — 77030006835 HC BLD SAW SAG STRY -B: Performed by: ORTHOPAEDIC SURGERY

## 2022-06-06 PROCEDURE — 77030007866 HC KT SPN ANES BBMI -B: Performed by: ANESTHESIOLOGY

## 2022-06-06 PROCEDURE — 77030035236 HC SUT PDS STRATFX BARB J&J -B: Performed by: ORTHOPAEDIC SURGERY

## 2022-06-06 PROCEDURE — 77030018723 HC ELCTRD BLD COVD -A: Performed by: ORTHOPAEDIC SURGERY

## 2022-06-06 PROCEDURE — 77030031139 HC SUT VCRL2 J&J -A: Performed by: ORTHOPAEDIC SURGERY

## 2022-06-06 PROCEDURE — 77030029820: Performed by: ORTHOPAEDIC SURGERY

## 2022-06-06 PROCEDURE — 76060000063 HC AMB SURG ANES 1.5 TO 2 HR: Performed by: ORTHOPAEDIC SURGERY

## 2022-06-06 PROCEDURE — 76030000020 HC AMB SURG 1.5 TO 2 HR INTENSV-TIER 1: Performed by: ORTHOPAEDIC SURGERY

## 2022-06-06 PROCEDURE — 2709999900 HC NON-CHARGEABLE SUPPLY: Performed by: ORTHOPAEDIC SURGERY

## 2022-06-06 PROCEDURE — 77030029828 HC FEM TIB CKPNT KT DISP STRY -B: Performed by: ORTHOPAEDIC SURGERY

## 2022-06-06 PROCEDURE — 77030028907 HC WRP KNEE WO BGS SOLM -B

## 2022-06-06 PROCEDURE — 74011000272 HC RX REV CODE- 272: Performed by: ORTHOPAEDIC SURGERY

## 2022-06-06 PROCEDURE — 94761 N-INVAS EAR/PLS OXIMETRY MLT: CPT

## 2022-06-06 PROCEDURE — 74011000250 HC RX REV CODE- 250: Performed by: ORTHOPAEDIC SURGERY

## 2022-06-06 PROCEDURE — 73560 X-RAY EXAM OF KNEE 1 OR 2: CPT

## 2022-06-06 PROCEDURE — 77030041305 HC PN BN MAKO STRY -B: Performed by: ORTHOPAEDIC SURGERY

## 2022-06-06 PROCEDURE — 76210000038 HC AMBSU PH I REC 2.5 TO 3 HR: Performed by: ORTHOPAEDIC SURGERY

## 2022-06-06 PROCEDURE — 74011000250 HC RX REV CODE- 250: Performed by: PHYSICIAN ASSISTANT

## 2022-06-06 PROCEDURE — 74011250636 HC RX REV CODE- 250/636: Performed by: PHYSICIAN ASSISTANT

## 2022-06-06 PROCEDURE — 74011250637 HC RX REV CODE- 250/637: Performed by: PHYSICIAN ASSISTANT

## 2022-06-06 PROCEDURE — 77030002933 HC SUT MCRYL J&J -A: Performed by: ORTHOPAEDIC SURGERY

## 2022-06-06 DEVICE — CRUCIATE RETAINING FEMORAL
Type: IMPLANTABLE DEVICE | Site: KNEE | Status: FUNCTIONAL
Brand: TRIATHLON

## 2022-06-06 DEVICE — TIBIAL BEARING INSERT - CR
Type: IMPLANTABLE DEVICE | Site: KNEE | Status: FUNCTIONAL
Brand: TRIATHLON

## 2022-06-06 DEVICE — KNEE K2 TOT HEMI ADV CMTLS -- IMPL CAPPED K2: Type: IMPLANTABLE DEVICE | Status: FUNCTIONAL

## 2022-06-06 DEVICE — PATELLA
Type: IMPLANTABLE DEVICE | Site: KNEE | Status: FUNCTIONAL
Brand: TRIATHLON

## 2022-06-06 DEVICE — TIBIAL COMPONENT
Type: IMPLANTABLE DEVICE | Site: KNEE | Status: FUNCTIONAL
Brand: TRIATHLON

## 2022-06-06 RX ORDER — ONDANSETRON 8 MG/1
4 TABLET, ORALLY DISINTEGRATING ORAL
Qty: 30 TABLET | Refills: 0 | Status: SHIPPED | OUTPATIENT
Start: 2022-06-06 | End: 2022-10-11 | Stop reason: ALTCHOICE

## 2022-06-06 RX ORDER — ACETAMINOPHEN 325 MG/1
650 TABLET ORAL EVERY 6 HOURS
Status: DISCONTINUED | OUTPATIENT
Start: 2022-06-06 | End: 2022-06-07 | Stop reason: HOSPADM

## 2022-06-06 RX ORDER — POLYETHYLENE GLYCOL 3350 17 G/17G
17 POWDER, FOR SOLUTION ORAL DAILY
Status: DISCONTINUED | OUTPATIENT
Start: 2022-06-07 | End: 2022-06-07 | Stop reason: HOSPADM

## 2022-06-06 RX ORDER — NALOXONE HYDROCHLORIDE 0.4 MG/ML
0.4 INJECTION, SOLUTION INTRAMUSCULAR; INTRAVENOUS; SUBCUTANEOUS AS NEEDED
Status: DISCONTINUED | OUTPATIENT
Start: 2022-06-06 | End: 2022-06-07 | Stop reason: HOSPADM

## 2022-06-06 RX ORDER — HYDROMORPHONE HYDROCHLORIDE 1 MG/ML
0.5 INJECTION, SOLUTION INTRAMUSCULAR; INTRAVENOUS; SUBCUTANEOUS
Status: DISCONTINUED | OUTPATIENT
Start: 2022-06-06 | End: 2022-06-07 | Stop reason: HOSPADM

## 2022-06-06 RX ORDER — TRAMADOL HYDROCHLORIDE 50 MG/1
50 TABLET ORAL
Qty: 28 TABLET | Refills: 0 | Status: SHIPPED | OUTPATIENT
Start: 2022-06-06 | End: 2022-06-13

## 2022-06-06 RX ORDER — ACETAMINOPHEN 500 MG
975 TABLET ORAL
Qty: 60 TABLET | Refills: 1 | Status: SHIPPED | OUTPATIENT
Start: 2022-06-06

## 2022-06-06 RX ORDER — TRIAMTERENE/HYDROCHLOROTHIAZID 37.5-25 MG
1 TABLET ORAL DAILY
Status: DISCONTINUED | OUTPATIENT
Start: 2022-06-07 | End: 2022-06-07 | Stop reason: HOSPADM

## 2022-06-06 RX ORDER — DEXTROSE MONOHYDRATE 100 MG/ML
0-250 INJECTION, SOLUTION INTRAVENOUS AS NEEDED
Status: DISCONTINUED | OUTPATIENT
Start: 2022-06-06 | End: 2022-06-07 | Stop reason: HOSPADM

## 2022-06-06 RX ORDER — INSULIN LISPRO 100 [IU]/ML
INJECTION, SOLUTION INTRAVENOUS; SUBCUTANEOUS
Status: DISCONTINUED | OUTPATIENT
Start: 2022-06-06 | End: 2022-06-07 | Stop reason: HOSPADM

## 2022-06-06 RX ORDER — SODIUM CHLORIDE, SODIUM LACTATE, POTASSIUM CHLORIDE, CALCIUM CHLORIDE 600; 310; 30; 20 MG/100ML; MG/100ML; MG/100ML; MG/100ML
125 INJECTION, SOLUTION INTRAVENOUS CONTINUOUS
Status: DISCONTINUED | OUTPATIENT
Start: 2022-06-06 | End: 2022-06-06 | Stop reason: HOSPADM

## 2022-06-06 RX ORDER — TRIAMTERENE AND HYDROCHLOROTHIAZIDE 37.5; 25 MG/1; MG/1
1 CAPSULE ORAL DAILY
Status: DISCONTINUED | OUTPATIENT
Start: 2022-06-07 | End: 2022-06-06 | Stop reason: CLARIF

## 2022-06-06 RX ORDER — EPHEDRINE SULFATE/0.9% NACL/PF 50 MG/5 ML
SYRINGE (ML) INTRAVENOUS AS NEEDED
Status: DISCONTINUED | OUTPATIENT
Start: 2022-06-06 | End: 2022-06-06 | Stop reason: HOSPADM

## 2022-06-06 RX ORDER — ROPIVACAINE HYDROCHLORIDE 5 MG/ML
INJECTION, SOLUTION EPIDURAL; INFILTRATION; PERINEURAL AS NEEDED
Status: DISCONTINUED | OUTPATIENT
Start: 2022-06-06 | End: 2022-06-06 | Stop reason: HOSPADM

## 2022-06-06 RX ORDER — OXYCODONE HYDROCHLORIDE 5 MG/1
5 TABLET ORAL
Status: DISCONTINUED | OUTPATIENT
Start: 2022-06-06 | End: 2022-06-07 | Stop reason: HOSPADM

## 2022-06-06 RX ORDER — LIDOCAINE HYDROCHLORIDE 10 MG/ML
0.1 INJECTION, SOLUTION EPIDURAL; INFILTRATION; INTRACAUDAL; PERINEURAL AS NEEDED
Status: DISCONTINUED | OUTPATIENT
Start: 2022-06-06 | End: 2022-06-06 | Stop reason: HOSPADM

## 2022-06-06 RX ORDER — SODIUM CHLORIDE 0.9 % (FLUSH) 0.9 %
5-40 SYRINGE (ML) INJECTION EVERY 8 HOURS
Status: DISCONTINUED | OUTPATIENT
Start: 2022-06-06 | End: 2022-06-07 | Stop reason: HOSPADM

## 2022-06-06 RX ORDER — PROPOFOL 10 MG/ML
INJECTION, EMULSION INTRAVENOUS
Status: DISCONTINUED | OUTPATIENT
Start: 2022-06-06 | End: 2022-06-06 | Stop reason: HOSPADM

## 2022-06-06 RX ORDER — MAGNESIUM SULFATE 100 %
4 CRYSTALS MISCELLANEOUS AS NEEDED
Status: DISCONTINUED | OUTPATIENT
Start: 2022-06-06 | End: 2022-06-07 | Stop reason: HOSPADM

## 2022-06-06 RX ORDER — FACIAL-BODY WIPES
10 EACH TOPICAL DAILY PRN
Status: DISCONTINUED | OUTPATIENT
Start: 2022-06-08 | End: 2022-06-07 | Stop reason: HOSPADM

## 2022-06-06 RX ORDER — TRANEXAMIC ACID 100 MG/ML
INJECTION, SOLUTION INTRAVENOUS AS NEEDED
Status: DISCONTINUED | OUTPATIENT
Start: 2022-06-06 | End: 2022-06-06 | Stop reason: HOSPADM

## 2022-06-06 RX ORDER — OXYCODONE HYDROCHLORIDE 5 MG/1
10 TABLET ORAL
Status: DISCONTINUED | OUTPATIENT
Start: 2022-06-06 | End: 2022-06-07 | Stop reason: HOSPADM

## 2022-06-06 RX ORDER — GABAPENTIN 300 MG/1
300 CAPSULE ORAL 2 TIMES DAILY
Status: DISCONTINUED | OUTPATIENT
Start: 2022-06-06 | End: 2022-06-07 | Stop reason: HOSPADM

## 2022-06-06 RX ORDER — SODIUM CHLORIDE 9 MG/ML
125 INJECTION, SOLUTION INTRAVENOUS CONTINUOUS
Status: DISCONTINUED | OUTPATIENT
Start: 2022-06-06 | End: 2022-06-07 | Stop reason: HOSPADM

## 2022-06-06 RX ORDER — FENTANYL CITRATE 50 UG/ML
INJECTION, SOLUTION INTRAMUSCULAR; INTRAVENOUS AS NEEDED
Status: DISCONTINUED | OUTPATIENT
Start: 2022-06-06 | End: 2022-06-06 | Stop reason: HOSPADM

## 2022-06-06 RX ORDER — SODIUM CHLORIDE 0.9 % (FLUSH) 0.9 %
5-40 SYRINGE (ML) INJECTION AS NEEDED
Status: DISCONTINUED | OUTPATIENT
Start: 2022-06-06 | End: 2022-06-07 | Stop reason: HOSPADM

## 2022-06-06 RX ORDER — ONDANSETRON 2 MG/ML
4 INJECTION INTRAMUSCULAR; INTRAVENOUS
Status: DISCONTINUED | OUTPATIENT
Start: 2022-06-06 | End: 2022-06-07 | Stop reason: HOSPADM

## 2022-06-06 RX ORDER — OXYCODONE HYDROCHLORIDE 5 MG/1
5 TABLET ORAL
Qty: 42 TABLET | Refills: 0 | Status: SHIPPED | OUTPATIENT
Start: 2022-06-06 | End: 2022-06-13

## 2022-06-06 RX ORDER — FENTANYL CITRATE 50 UG/ML
25 INJECTION, SOLUTION INTRAMUSCULAR; INTRAVENOUS
Status: DISCONTINUED | OUTPATIENT
Start: 2022-06-06 | End: 2022-06-06 | Stop reason: HOSPADM

## 2022-06-06 RX ORDER — AMOXICILLIN 250 MG
1 CAPSULE ORAL 2 TIMES DAILY
Status: DISCONTINUED | OUTPATIENT
Start: 2022-06-06 | End: 2022-06-07 | Stop reason: HOSPADM

## 2022-06-06 RX ORDER — PREGABALIN 75 MG/1
75 CAPSULE ORAL ONCE
Status: COMPLETED | OUTPATIENT
Start: 2022-06-06 | End: 2022-06-06

## 2022-06-06 RX ORDER — BUPIVACAINE HYDROCHLORIDE 5 MG/ML
INJECTION, SOLUTION EPIDURAL; INTRACAUDAL AS NEEDED
Status: DISCONTINUED | OUTPATIENT
Start: 2022-06-06 | End: 2022-06-06 | Stop reason: HOSPADM

## 2022-06-06 RX ORDER — FLUMAZENIL 0.1 MG/ML
0.2 INJECTION INTRAVENOUS
Status: DISCONTINUED | OUTPATIENT
Start: 2022-06-06 | End: 2022-06-06 | Stop reason: HOSPADM

## 2022-06-06 RX ORDER — DIPHENHYDRAMINE HYDROCHLORIDE 50 MG/ML
12.5 INJECTION, SOLUTION INTRAMUSCULAR; INTRAVENOUS
Status: DISCONTINUED | OUTPATIENT
Start: 2022-06-06 | End: 2022-06-07 | Stop reason: HOSPADM

## 2022-06-06 RX ORDER — ACETAMINOPHEN 325 MG/1
975 TABLET ORAL ONCE
Status: COMPLETED | OUTPATIENT
Start: 2022-06-06 | End: 2022-06-06

## 2022-06-06 RX ORDER — MELATONIN
2000 DAILY
Status: DISCONTINUED | OUTPATIENT
Start: 2022-06-07 | End: 2022-06-07 | Stop reason: HOSPADM

## 2022-06-06 RX ORDER — HYDROMORPHONE HYDROCHLORIDE 1 MG/ML
.25-1 INJECTION, SOLUTION INTRAMUSCULAR; INTRAVENOUS; SUBCUTANEOUS
Status: DISCONTINUED | OUTPATIENT
Start: 2022-06-06 | End: 2022-06-06 | Stop reason: HOSPADM

## 2022-06-06 RX ORDER — OXYCODONE HYDROCHLORIDE 5 MG/1
5 TABLET ORAL
Status: DISCONTINUED | OUTPATIENT
Start: 2022-06-06 | End: 2022-06-06 | Stop reason: HOSPADM

## 2022-06-06 RX ORDER — DIPHENHYDRAMINE HYDROCHLORIDE 50 MG/ML
12.5 INJECTION, SOLUTION INTRAMUSCULAR; INTRAVENOUS AS NEEDED
Status: DISCONTINUED | OUTPATIENT
Start: 2022-06-06 | End: 2022-06-06 | Stop reason: HOSPADM

## 2022-06-06 RX ORDER — MIDAZOLAM HYDROCHLORIDE 1 MG/ML
INJECTION, SOLUTION INTRAMUSCULAR; INTRAVENOUS AS NEEDED
Status: DISCONTINUED | OUTPATIENT
Start: 2022-06-06 | End: 2022-06-06 | Stop reason: HOSPADM

## 2022-06-06 RX ORDER — POVIDONE-IODINE 10 %
SOLUTION, NON-ORAL TOPICAL AS NEEDED
Status: DISCONTINUED | OUTPATIENT
Start: 2022-06-06 | End: 2022-06-06 | Stop reason: HOSPADM

## 2022-06-06 RX ORDER — NALOXONE HYDROCHLORIDE 0.4 MG/ML
0.2 INJECTION, SOLUTION INTRAMUSCULAR; INTRAVENOUS; SUBCUTANEOUS
Status: DISCONTINUED | OUTPATIENT
Start: 2022-06-06 | End: 2022-06-06 | Stop reason: HOSPADM

## 2022-06-06 RX ORDER — LIDOCAINE HYDROCHLORIDE 20 MG/ML
INJECTION, SOLUTION EPIDURAL; INFILTRATION; INTRACAUDAL; PERINEURAL AS NEEDED
Status: DISCONTINUED | OUTPATIENT
Start: 2022-06-06 | End: 2022-06-06 | Stop reason: HOSPADM

## 2022-06-06 RX ORDER — LISINOPRIL 5 MG/1
10 TABLET ORAL EVERY OTHER DAY
Status: DISCONTINUED | OUTPATIENT
Start: 2022-06-07 | End: 2022-06-07 | Stop reason: HOSPADM

## 2022-06-06 RX ORDER — OXYCODONE HCL 10 MG/1
10 TABLET, FILM COATED, EXTENDED RELEASE ORAL ONCE
Status: COMPLETED | OUTPATIENT
Start: 2022-06-06 | End: 2022-06-06

## 2022-06-06 RX ORDER — FAMOTIDINE 20 MG/1
20 TABLET, FILM COATED ORAL DAILY
Status: DISCONTINUED | OUTPATIENT
Start: 2022-06-06 | End: 2022-06-07 | Stop reason: HOSPADM

## 2022-06-06 RX ORDER — PREGABALIN 75 MG/1
75 CAPSULE ORAL
Qty: 30 CAPSULE | Refills: 0 | Status: SHIPPED | OUTPATIENT
Start: 2022-06-06 | End: 2022-06-07

## 2022-06-06 RX ADMIN — PROPOFOL 75 MCG/KG/MIN: 10 INJECTION, EMULSION INTRAVENOUS at 13:05

## 2022-06-06 RX ADMIN — MIDAZOLAM HYDROCHLORIDE 1 MG: 1 INJECTION, SOLUTION INTRAMUSCULAR; INTRAVENOUS at 12:22

## 2022-06-06 RX ADMIN — MIDAZOLAM HYDROCHLORIDE 1 MG: 1 INJECTION, SOLUTION INTRAMUSCULAR; INTRAVENOUS at 13:52

## 2022-06-06 RX ADMIN — ACETAMINOPHEN 975 MG: 325 TABLET ORAL at 10:49

## 2022-06-06 RX ADMIN — Medication 10 MG: at 13:16

## 2022-06-06 RX ADMIN — CEFAZOLIN SODIUM 2 G: 10 INJECTION, POWDER, FOR SOLUTION INTRAVENOUS at 13:04

## 2022-06-06 RX ADMIN — MIDAZOLAM HYDROCHLORIDE 2 MG: 1 INJECTION, SOLUTION INTRAMUSCULAR; INTRAVENOUS at 12:17

## 2022-06-06 RX ADMIN — Medication 10 ML: at 22:00

## 2022-06-06 RX ADMIN — LIDOCAINE HYDROCHLORIDE 60 MG: 20 INJECTION, SOLUTION INTRAVENOUS at 13:01

## 2022-06-06 RX ADMIN — SODIUM CHLORIDE 1 G: 9 INJECTION, SOLUTION INTRAVENOUS at 14:10

## 2022-06-06 RX ADMIN — OXYCODONE 5 MG: 5 TABLET ORAL at 22:32

## 2022-06-06 RX ADMIN — FENTANYL CITRATE 50 MCG: 50 INJECTION, SOLUTION INTRAMUSCULAR; INTRAVENOUS at 12:22

## 2022-06-06 RX ADMIN — CEFAZOLIN SODIUM 2 G: 1 POWDER, FOR SOLUTION INTRAMUSCULAR; INTRAVENOUS at 21:28

## 2022-06-06 RX ADMIN — MIDAZOLAM HYDROCHLORIDE 1 MG: 1 INJECTION, SOLUTION INTRAMUSCULAR; INTRAVENOUS at 13:01

## 2022-06-06 RX ADMIN — SODIUM CHLORIDE 125 ML/HR: 9 INJECTION, SOLUTION INTRAVENOUS at 18:10

## 2022-06-06 RX ADMIN — OXYCODONE HYDROCHLORIDE 10 MG: 10 TABLET, FILM COATED, EXTENDED RELEASE ORAL at 10:49

## 2022-06-06 RX ADMIN — BUPIVACAINE HYDROCHLORIDE 2.2 ML: 5 INJECTION, SOLUTION EPIDURAL; INTRACAUDAL; PERINEURAL at 12:25

## 2022-06-06 RX ADMIN — ACETAMINOPHEN 650 MG: 325 TABLET ORAL at 18:10

## 2022-06-06 RX ADMIN — GABAPENTIN 300 MG: 300 CAPSULE ORAL at 18:10

## 2022-06-06 RX ADMIN — TRANEXAMIC ACID 1 G: 100 INJECTION, SOLUTION INTRAVENOUS at 13:11

## 2022-06-06 RX ADMIN — DOCUSATE SODIUM 50MG AND SENNOSIDES 8.6MG 1 TABLET: 8.6; 5 TABLET, FILM COATED ORAL at 18:10

## 2022-06-06 RX ADMIN — Medication 20 MG: at 13:31

## 2022-06-06 RX ADMIN — Medication 10 MG: at 13:22

## 2022-06-06 RX ADMIN — Medication 10 ML: at 18:10

## 2022-06-06 RX ADMIN — SODIUM CHLORIDE 1 G: 9 INJECTION, SOLUTION INTRAVENOUS at 13:19

## 2022-06-06 RX ADMIN — FENTANYL CITRATE 50 MCG: 50 INJECTION, SOLUTION INTRAMUSCULAR; INTRAVENOUS at 12:17

## 2022-06-06 RX ADMIN — OXYCODONE 5 MG: 5 TABLET ORAL at 18:58

## 2022-06-06 RX ADMIN — ROPIVACAINE HYDROCHLORIDE 30 MG: 5 INJECTION, SOLUTION EPIDURAL; INFILTRATION; PERINEURAL at 12:18

## 2022-06-06 RX ADMIN — FAMOTIDINE 20 MG: 20 TABLET, FILM COATED ORAL at 18:10

## 2022-06-06 RX ADMIN — PREGABALIN 75 MG: 75 CAPSULE ORAL at 10:49

## 2022-06-06 NOTE — PROGRESS NOTES
Problem: Patient Education: Go to Patient Education Activity  Goal: Patient/Family Education  Outcome: Progressing Towards Goal     Problem: Knee Replacement: Day of Surgery/Unit  Goal: Off Pathway (Use only if patient is Off Pathway)  Outcome: Progressing Towards Goal  Goal: Activity/Safety  Outcome: Progressing Towards Goal  Goal: Consults, if ordered  Outcome: Progressing Towards Goal  Goal: Diagnostic Test/Procedures  Outcome: Progressing Towards Goal  Goal: Nutrition/Diet  Outcome: Progressing Towards Goal  Goal: Medications  Outcome: Progressing Towards Goal  Goal: Respiratory  Outcome: Progressing Towards Goal  Goal: Treatments/Interventions/Procedures  Outcome: Progressing Towards Goal  Goal: Psychosocial  Outcome: Progressing Towards Goal  Goal: *Initiate mobility  Outcome: Progressing Towards Goal  Goal: *Optimal pain control at patient's stated goal  Outcome: Progressing Towards Goal  Goal: *Hemodynamically stable  Outcome: Progressing Towards Goal     Problem: Knee Replacement: Post-Op Day 1  Goal: Off Pathway (Use only if patient is Off Pathway)  Outcome: Progressing Towards Goal  Goal: Activity/Safety  Outcome: Progressing Towards Goal  Goal: Diagnostic Test/Procedures  Outcome: Progressing Towards Goal  Goal: Nutrition/Diet  Outcome: Progressing Towards Goal  Goal: Medications  Outcome: Progressing Towards Goal  Goal: Respiratory  Outcome: Progressing Towards Goal  Goal: Treatments/Interventions/Procedures  Outcome: Progressing Towards Goal  Goal: Psychosocial  Outcome: Progressing Towards Goal  Goal: Discharge Planning  Outcome: Progressing Towards Goal  Goal: *Demonstrates progressive activity  Outcome: Progressing Towards Goal  Goal: *Optimal pain control at patient's stated goal  Outcome: Progressing Towards Goal  Goal: *Hemodynamically stable  Outcome: Progressing Towards Goal  Goal: *Discharge plan identified  Outcome: Progressing Towards Goal     Problem: Knee Replacement: Post-Op Day 2  Goal: Off Pathway (Use only if patient is Off Pathway)  Outcome: Progressing Towards Goal  Goal: Activity/Safety  Outcome: Progressing Towards Goal  Goal: Diagnostic Test/Procedures  Outcome: Progressing Towards Goal  Goal: Medications  Outcome: Progressing Towards Goal  Goal: Respiratory  Outcome: Progressing Towards Goal  Goal: Treatments/Interventions/Procedures  Outcome: Progressing Towards Goal  Goal: Psychosocial  Outcome: Progressing Towards Goal  Goal: *Met physical therapy criteria for discharge to the next level of care  Outcome: Progressing Towards Goal  Goal: *Optimal pain control with oral analgesia  Outcome: Progressing Towards Goal  Goal: *Hemodynamically stable  Outcome: Progressing Towards Goal  Goal: *Tolerating diet  Outcome: Progressing Towards Goal  Goal: *Patient verbalizes understanding of discharge instructions  Outcome: Progressing Towards Goal

## 2022-06-06 NOTE — H&P
Orthopaedic PRE-OP Admission History and Physical    Past Medical History:   Diagnosis Date    Arthritis     Hypertension     Other and unspecified hyperlipidemia     Pre-diabetes     Unspecified vitamin D deficiency       Past Surgical History:   Procedure Laterality Date    HX HIP REPLACEMENT Right 05/04/2020    HX HYSTERECTOMY  1983    HX LUMBAR FUSION  2021      Prior to Admission medications    Medication Sig Start Date End Date Taking? Authorizing Provider   traMADoL (ULTRAM) 50 mg tablet Take 50 mg by mouth daily as needed for Pain. Provider, Historical   triamterene-hydroCHLOROthiazide (DYAZIDE) 37.5-25 mg per capsule TAKE 1 CAPSULE EVERY DAY 3/15/22   Calvin Johnston MD   gabapentin (NEURONTIN) 300 mg capsule Take 1 Capsule by mouth two (2) times a day. Max Daily Amount: 600 mg. 10/13/21   Calvin Johnston MD   Accu-Chek Guide Glucose Meter misc USE AS DIRECTED 9/21/21   Calvin Johnston MD   lisinopriL (PRINIVIL, ZESTRIL) 10 mg tablet Take 1 Tablet by mouth every other day. 7/12/21   Calvin Johnston MD   lancets (Accu-Chek Softclix Lancets) misc Check  Blood sugar daily E11.9 4/15/21   Calvin Johnston MD   senna-docusate (PERICOLACE) 8.6-50 mg per tablet Take 1 Tab by mouth two (2) times a day. Patient taking differently: Take 1 Tablet by mouth daily. 3/19/21   Jagruti Vann NP   acetaminophen (Tylenol Arthritis Pain) 650 mg TbER Take 1 Tab by mouth every eight (8) hours. 12/21/20   Calvin Johnston MD   VITAMIN D3 2,000 unit cap capsule Take 1 Cap by mouth every morning. 4/27/15   Provider, Historical     No current facility-administered medications for this encounter. Current Outpatient Medications   Medication Sig    traMADoL (ULTRAM) 50 mg tablet Take 50 mg by mouth daily as needed for Pain.  triamterene-hydroCHLOROthiazide (DYAZIDE) 37.5-25 mg per capsule TAKE 1 CAPSULE EVERY DAY    gabapentin (NEURONTIN) 300 mg capsule Take 1 Capsule by mouth two (2) times a day.  Max Daily Amount: 600 mg.    Accu-Chek Guide Glucose Meter misc USE AS DIRECTED    lisinopriL (PRINIVIL, ZESTRIL) 10 mg tablet Take 1 Tablet by mouth every other day.  lancets (Accu-Chek Softclix Lancets) misc Check  Blood sugar daily E11.9    senna-docusate (PERICOLACE) 8.6-50 mg per tablet Take 1 Tab by mouth two (2) times a day. (Patient taking differently: Take 1 Tablet by mouth daily.)    acetaminophen (Tylenol Arthritis Pain) 650 mg TbER Take 1 Tab by mouth every eight (8) hours.  VITAMIN D3 2,000 unit cap capsule Take 1 Cap by mouth every morning. Allergies   Allergen Reactions    Codeine Other (comments)     \"Didn't like the way it made me feel\"    Pcn [Penicillins] Itching     TOLERATED ANCEF GRADED CHALLENGE 3/16/2021 WITH NO ADVERSE REACTIONS    Iodine Unknown (comments)     Pt unknown of reaction stated it was years ago      Lipitor [Atorvastatin] Myalgia        Review of Systems  Review of systems was documented in PAT and also in the HPI. Physical Exam  Gen: No acute distress   Resp: No accessory muscle use, no acute distress, conversant without gasping, clear lung fields. Card: No abnormalities detected, RRR- See PAT exam if available. Abd: Soft, non-tender, non-distended  Lymph: No palpable lymph nodes of the affected extremity  Skin: No skin breakdown noted. Labs: No results for input(s): WBC, HGB, HCT, K, CREA, GLU, CRP, HGBEXT, HCTEXT in the last 72 hours. No lab exists for component: ESR    [facility] Note - Subjective / Exam / Imaging / Plan           CHIEF COMPLAINT:      Bilateral, Left greater than Right  Knee Pain / Disability  HISTORY OF PRESENT ILLNESS:      Ms. Jenifer Benson describes pain localized diffusely throughout the knee and reports symptoms have been present for 3-4 years. Most recently she notes the intensity is 8/10 and she has the following mechanical symptoms - none. She notes some episodes of functional instability.  The pain is interfering with exercise and/or daily activities. Ms. Vianney Richards explained that symptoms began with no history of specific injury or trauma. Previous treatment includes steroid injections and physical therapy that have not provided significant relief. The patient has had no lower extremity surgeries. Heart and / or systemic pertinent medical issues include : No significant heart or lung issues.       OCCUPATION  & SOCIAL HISTORY :      Work / Expectations : The patient is retired and worked previously at [employer] and [employer] as an  / She would like to be in less pain so that she can move better. The patient  reports that she has quit smoking. She has never used smokeless tobacco. She reports that she does not drink alcohol and does not use drugs.      Vitals :      Ht 5' 7\"   Wt 205 lb   LMP  (LMP Unknown)   BMI 32.11 kg/m²   PHYSICAL EXAM :      The patient was alert and oriented to person, place and date with appropriate response to questioning. The patients occular movements were symmetric and alignment normal. No respiratory distress was noted during the examination. The patient had no obvious trophic skin changes. Motor and sensory examination was symetric and equal compared to the opposite extremity. Pulses were palpable. The BMI was above average. The patient was very pleasant during the examination. Focal examination of the Left knee demonstrates range of motion of 0-105 degrees. Evaluation of the affected demonstrates a slight effusion. Alignment of the knee was valgus of 5 degrees. Palpation of the medial and lateral joint lines demonstrated pain over the medial joint line greater than lateral join line. Tracking of the patella was lateral with moderate patellofemoral crepitus, Ligamentous examination of the knee was stable to testing. Observed gait was antalgic with varus thrust on the left side.  Notable scars / incisions include none noted, no skin irregularities.            Focal examination of the Right knee demonstrates range of motion of 5-95 degrees. Evaluation of the affected demonstrates a moderate effusion. Alignment of the knee was valgus of 10 degrees. Palpation of the medial and lateral joint lines demonstrated  pain over the medial joint line. Tracking of the patella was lateral with moderate patellofemoral crepitus, Ligamentous examination of the knee was stable to testing. Notable scars / incisions include none noted, no skin irregularities.     RADIOGRAPHIC RESULTS / LABORATORY RESULTS:       X-ray knee left 4+ views ([id number])     Result Date: 1/25/2022  Shielding: Shielded. Views: Standing AP, Lat, [place], [place].     Impression: Indication: Pain Impression: Severe varus arthritis noted with bone on bone medial changes and osteophytes noted with subchondral sclerosis     X-ray knee right 4+ views ([id number])     Result Date: 1/25/2022  Shielding: Shielded. Views: Standing AP, Lat, [place], [place].     Impression: Indication(s): Pain Impression: Severe valgus arthritis noted with bone on bone lateral changes and osteophytes noted with subchondral sclerosis          . ASSESSMENT:      (M17.11) Primary localized osteoarthritis of right knee  (primary encounter diagnosis)  (M17.12) Primary localized osteoarthritis of left knee  (M25.561,  M25.562) Acute pain of both knees  PLAN :      Medical Decision Making and Discussion: The patient and I discussed the diagnosis and treatment options which include replacing both knees staged 6-8 weeks apart. I have ordered a CT SALMA scan of the left knee for further evaluation and perioperative planning. She should call at anytime if her right knee is hurting more and she would like to have that one replaced first. We reviewed the diagnosis and all questions were answered. Therapy recommendations: No physical therapy was ordered during this visit   Medications this Visit: No medications were prescribed today  Medical Concerns or Issues:  The patient is healthy, please see the review of systems and HPI for details. Follow-up: Proceed with Left TKA     CLINICAL ORDERS THIS VISIT :                 Surgical Counseling   After a thorough discussion we will proceed with surgical intervention without contraindications. I discussed surgical indications and alternatives with the patient. Risks including infection, bleeding, damage to other structures, need for further surgery and the risks of anesthesia (DVT, PE, Stroke, Heart Attack and Death) have been discussed with the patient. Verbal and written consent were obtained.          Judith Horvath MD  Cell [phone]  Ravindra Ahuja PA-C  Cell [phone]  Medical Staff : Betty Lagunas  Office : [phone]

## 2022-06-06 NOTE — OP NOTES
OPERATIVE REPORT     Admit Date: 6/6/2022  Admit Diagnosis: DJD LEFT KNEE    Date of Procedure: 6/6/2022   Preoperative Diagnosis: DJD LEFT KNEE  Postoperative Diagnosis: DJD LEFT KNEE    Procedure: Procedure(s):  LEFT TOTAL KNEE ARTHROPLASTY MAKOPLASTY (FAST TRACK)  Surgeon: Arabella Alberto MD  Assistant(s): Lisette Navarro PA-C  Anesthesia: Spinal   Estimated Blood Loss: 300  Specimens: * No specimens in log *   Complications: None       INDICATIONS:   The patient is a 67 y.o., female who has complained of a long history of knee pain. The patient  has failed conservative treatment and presents for definitive operative care. Informed consent obtained including a discussion of the risks and benefits, which include, but are not limited to, bleeding, infection, neurovascular damage, wound complications, pain and stiffness in the knee, periprosthetic loosening, fracture dislocation and DVT, the patient consented for the procedure. DESCRIPTION OF PROCEDURE:        The patient was seen in the preoperative holding area. The patient was positively identified. The limb was initialed,  questions were answered. The patient was given Ancef preop for an antibiotic. The patient was subsequently taken to the operating room. The patient underwent spinal anesthesia. The patient was positioned in the supine position. All bony prominences were well padded. The limb was prepped and draped in a sterile fashion. The appropriate pause for safety was performed. A mid-line incision was created. Utilizing an incision from above the superior pole of the patella distally to the tibial tubercle. The incision was taken down through the skin and subcutaneous tissue until the retinaculum could be identified. This was sharply incised utilizing a medial parapatellar incision. The femoral array was placed at the superior portion of the patella.  The patellar was everted, a planar resurfacing was performed and the drill guide was placed with the appropriate rotation. The medial-based soft tissue was then retracted as well as well as the lateral tissue. Jackelyn pins were placed within the incision for thetibial array (one hand breadth proximal to the superior pole of the patella). The femoral and tibial trackers were placed. The hip center or rotation was established. Registration was performed on the femur and tibia. Osteophytes were removed. The knee was balanced in both flexion and extension with force applied. The computer model of implants and position was verified. Once this was complete the MAKOplasty robot was positioned. Standard cuts were made for the tibia first. The tibial cut was removed. The remaining femoral cuts were made with the robot. The blade was changed and the medial meniscus was removed. The tibial preparation was completed. Including removal of residual medial and lateral mensci. Tibial baseplate placement and keel preparation were performed along with drill holes for the tibial baseplate. Final bony osteophytes were removed and the meniscal rim was removed. The knee was injected with 60cc of Morphine / Ketoralac and Lidocaine. Trial implants were placed and range of motion along with overall fit was established with very good integrity of the MCL noted. The jackelyn pins and trackers were removed and accounted for prior to closure. All the bony surfaces were irrigated. The tibia was placed first, then the poly, residual osteophytes were removed and then the femur. Finally, the patella was placed and press-fit with the clamp / impaction. There was normal tracking of the patella at the conclusion of the case. The knee was very stable after it was taken through a full range of motion. The wound was closed with 0 Vicryl and then number 2 Quill proximally. Once this had been completed, the skin was closed with 2-0 Vicryl 4-0 monocryl suture and Dermabond. A sterile dressing was applied.  The patient was taken from the operating room in stable condition. OPERATIVE FINDINGS : Severe tricompartmental OA noted, Final ROM 0-125    IMPLANTS :   Implant Name Type Inv. Item Serial No.  Lot No. LRB No. Used Action   BASEPLT TIB PC TRITNM SZ 5 -- TRIATHLON - SN/A  BASEPLT TIB PC TRITNM SZ 5 -- TRIATHLON N/A ANA [employer] In*Situ Architecture [id number] Left 1 Implanted   COMPONENT PAT [id number] SUPERIOR/INFERIOR KNEE - SN/A  COMPONENT PAT [id number] SUPERIOR/INFERIOR KNEE N/A ANA [employer] In*Situ Architecture [id number] Left 1 Implanted   TRIATHLON X3 TIBIAL BEARING INSERT -CR   N/A ANA ORTHOPAEDICS [id number] Left 1 Implanted   COMPNT FEM CR TRIATHLN 4 L PA --  - SN/A  COMPNT FEM CR TRIATHLN 4 L PA --  N/A ANA ORTHOPEDICS In*Situ Architecture [id number] Left 1 Implanted       POST OPERATIVE CONSIDERATIONS :  WBAT    JUSTIFICATION FOR SURGICAL ASSISTANT:   Surgical Assistant, was requried and necessary in this case, to help with soft tissue retraction, extremity positioning, equiment management, implant management, and wound closure.     Lg Miner MD

## 2022-06-06 NOTE — PERIOP NOTES
TRANSFER - OUT REPORT:    Verbal report given to [employer], RN(name) on Orly Salomon  being transferred to [facility](unit) for routine post - op       Report consisted of patients Situation, Background, Assessment and   Recommendations(SBAR). Information from the following report(s) SBAR, OR Summary, Intake/Output, MAR and Cardiac Rhythm NSR was reviewed with the receiving nurse. Lines:   Peripheral IV 06/06/22 Anterior;Distal;Right Wrist (Active)   Site Assessment Clean, dry, & intact 06/06/22 1720   Phlebitis Assessment 0 06/06/22 1720   Infiltration Assessment 0 06/06/22 1720   Dressing Status Clean, dry, & intact 06/06/22 1720   Dressing Type Transparent 06/06/22 1720   Hub Color/Line Status Pink 06/06/22 1720   Alcohol Cap Used Yes 06/06/22 1720        Opportunity for questions and clarification was provided.       Patient transported with:   Registered Nurse

## 2022-06-06 NOTE — ANESTHESIA POSTPROCEDURE EVALUATION
Procedure(s):  LEFT TOTAL KNEE ARTHROPLASTY MAKOPLASTY (FAST TRACK). regional, spinal    Anesthesia Post Evaluation      Multimodal analgesia: multimodal analgesia used between 6 hours prior to anesthesia start to PACU discharge  Patient location during evaluation: PACU  Patient participation: complete - patient participated  Level of consciousness: awake and alert  Pain management: adequate  Airway patency: patent  Anesthetic complications: no  Cardiovascular status: acceptable  Respiratory status: acceptable  Hydration status: acceptable  Post anesthesia nausea and vomiting:  none  Final Post Anesthesia Temperature Assessment:  Normothermia (36.0-37.5 degrees C)      INITIAL Post-op Vital signs:   Vitals Value Taken Time   BP 99/58 06/06/22 1600   Temp 36.3 °C (97.4 °F) 06/06/22 1456   Pulse 77 06/06/22 1601   Resp 15 06/06/22 1601   SpO2 96 % 06/06/22 1601   Vitals shown include unvalidated device data.

## 2022-06-06 NOTE — ANESTHESIA PROCEDURE NOTES
Spinal Block    Start time: 6/6/2022 12:23 PM  End time: 6/6/2022 12:28 PM  Performed by: Julio Pa MD  Authorized by: Julio Pa MD     Pre-procedure:   Indications: primary anesthetic  Preanesthetic Checklist: patient identified, risks and benefits discussed, anesthesia consent, patient being monitored and timeout performed    Timeout Time: 12:23 EDT          Spinal Block:   Patient Position:  Seated  Prep Region:  Lumbar      Location:  L2-3  Technique:  Single shot        Needle:   Needle Type:  Pencil-tip  Needle Gauge:  25 G  Attempts:  1      Events: CSF confirmed        Assessment:  Insertion:  Uncomplicated  Patient tolerance:  Patient tolerated the procedure well with no immediate complications

## 2022-06-06 NOTE — ANESTHESIA PROCEDURE NOTES
Peripheral Block    Start time: 6/6/2022 12:17 PM  End time: 6/6/2022 12:20 PM  Performed by: Charlestine Phoenix, MD  Authorized by: Charlestine Phoenix, MD       Pre-procedure:    Indications: post-op pain management    Preanesthetic Checklist: patient identified, risks and benefits discussed, site marked, timeout performed, anesthesia consent given and patient being monitored    Timeout Time: 12:17 EDT          Block Type:   Block Type:  IPACK  Laterality:  Left  Monitoring:  Standard ASA monitoring, continuous pulse ox, frequent vital sign checks, heart rate, responsive to questions and oxygen  Injection Technique:  Single shot  Procedures: ultrasound guided    Patient Position: supine  Prep: chlorhexidine    Location:  Upper thigh  Needle Type:  Stimuplex  Needle Localization:  Ultrasound guidance    Assessment:  Number of attempts:  1  Injection Assessment:  Incremental injection every 5 mL  Patient tolerance:  Patient tolerated the procedure well with no immediate complications

## 2022-06-06 NOTE — ANESTHESIA PREPROCEDURE EVALUATION
Relevant Problems   CARDIOVASCULAR   (+) Essential hypertension      ENDOCRINE   (+) Arthritis   (+) Controlled type 2 diabetes mellitus without complication, without long-term current use of insulin (HCC)   (+) Diabetes mellitus type 2, controlled (HCC)   (+) Severe obesity (BMI 35.0-39. 9) with comorbidity (HCC)   (+) Type 2 diabetes mellitus with diabetic neuropathy (HCC)   (+) Type 2 diabetes with nephropathy ([facility] [address]       Anesthetic History   No history of anesthetic complications            Review of Systems / Medical History  Patient summary reviewed and pertinent labs reviewed    Pulmonary  Within defined limits                 Neuro/Psych   Within defined limits           Cardiovascular    Hypertension                   GI/Hepatic/Renal  Within defined limits              Endo/Other    Diabetes: type 2    Obesity and arthritis     Other Findings   Comments: H/o back surgery           Physical Exam    Airway  Mallampati: II  TM Distance: 4 - 6 cm  Neck ROM: normal range of motion   Mouth opening: Normal     Cardiovascular  Regular rate and rhythm,  S1 and S2 normal,  no murmur, click, rub, or gallop  Rhythm: regular  Rate: normal         Dental  No notable dental hx       Pulmonary  Breath sounds clear to auscultation               Abdominal  GI exam deferred       Other Findings            Anesthetic Plan    ASA: 2  Anesthesia type: general and regional - IPACK block      Post-op pain plan if not by surgeon: peripheral nerve block single      Anesthetic plan and risks discussed with: Patient

## 2022-06-06 NOTE — DISCHARGE INSTRUCTIONS
TOTAL KNEE DISCHARGE INSTRUCTIONS      Patient: Rivas Melo MRN: [id number]  SSN: [id number]              Please take the time to review the following instructions before you leave the hospital and use them as guidelines during your recovery from surgery. If you have any questions you may contact my office at [phone]  After business hours or during the weekend you can contact me through [address] or text / call at [phone] (cell phone) for emergency's. Please use the office number during regular business hours. SPECIAL INSTRUCTIONS :   1. Full extension at the knee is the most important aspect of your range of motion. Avoid placing a pillow or bump behind the knee. Rather, place the heel up on a bump or pillow and allow gravity to help straighten the knee. 2. You may weight bear as tolerated on the knee and during the day you should bend the knee as much as possible. 3. Drainage from the incision more than 4 days from surgery is concerning. Contact my office if there is any question [phone].   4. You may contact me directly through [employer] if there are specific questions or text / call using my cell number [phone]. DRESSING :     Post-op Dressings : This should be removed by physical therapy or you may remove this yourself 7 days after the date of your surgery. If there is no drainage, then a simple dressing may be used or no dressing at all. Other dressing options can be purchased over the counter at a local pharmacy or medical supply vendor. A porous adhesive dressing such as pictured above can be purchased online ([address] [facility] [address]) or at your local [facility] or [employer]. You only need to keep the incision covered for 7 days after showers. A dressing may be used for longer if there are issues with clothing clinging to the incision. Showering/ Bathing: You may shower with the Post-op dressing in place.  This is left in place for 7 days following discharge from the hospital. If your incision is dry without drainage you may shower following your discharge home. After 7 days your dressing should be removed for showering. It is fine to have water run over the incision. Do not vigorously scrub your incision. Apply a clean, dry dressing after you have dried your incision. Do not take a bath or get into a swimming pool / [place] until you follow up with Dr. Sruthi Ewing. Do not soak your incision under water. If there is continued drainage or you are concerned contact Dr Benigno Wong office prior to showering [phone] ext [phone] . Diet:  You may advance to your regular diet as tolerated. Increase your clear liquid intake for the next 2-3 days. Medication:      1. You will be given prescriptions for pain medication when you are discharged from the hospital. The side effects of these medications can be substantial and the narcotic medications are not mandatory. You may substitute these medications with Tylenol or Alleve / Motrin. 2. Please use the medications as prescribed. Pain medications may cause constipation- Colace twice daily and Miralax one scoop daily while taking the narcotic medication should help prevent constipation. Please discuss with your local pharmacist regarding increasing this dosage if constipation persists. Other possible side effects of pain medication are dizziness, headache, nausea, vomiting, and urinary retention. Discontinue the pain medication if you develop itching, rash, shortness of breath, or difficulties swallowing. If these symptoms become severe or are not relieved by discontinuing the medication, you should seek immediate medical attention. 3. Refills of pain medication are authorized during office hours only (8 AM- 5 PM  Monday thru Friday). Many of these medication will require you or a family member to pick-up a physical prescription at the office.    4. Medications other than antiinflammatories will not be called into the pharmacy after business hours. 5. You may resume the medication(s) you were taking prior to your surgery. Narcotics may change the effects of some antidepressant medication(s). If you have any questions about possible interactions between your regular medications and the pain medication, you should ask the pharmacist or contact the prescribing physician. 6. If you have constipation which is not improved by oral stool softeners then a Ducolax suppository should be purchased over the counter. 7. Continue the blood thinner (Aspirin or Lovenox) for a total of 30 days following surgery. Follow up appointment:    Please call our office at [phone] for your follow up appointment. This should be scheduled 14 days following the date of surgery. You should also have a scheduled appointment for physical therapy following surgery. Physical Therapy / Nursing:    Physical Therapy following surgery will be arranged as an outpatient or at home. They have specific instructions for rehab and wound care. It is fine to have physical therapy remove your dressing at 7 days following surgery. Returning to work:    Normal return to work is 6-12 weeks following surgery. Depending on your progression following surgery and specific job duties you may take longer for a full return to work. DRIVING    You should not return to driving until you are off all opioid pain medications and able to safely and quickly apply the brakes. This is normally 2-6 weeks for left sided surgery and 2-8 weeks for right sided surgery. Important Signs and Symptoms:    If any of the following signs or symptoms occur, you should contact Dr. Francy dove.   Please be advised if a problem arises which you feel requires immediate medical attention or you are unable to contact Dr. Francy dove you should seek immediate medical attention at the ER or other health care facility you have access to.    1. A sudden increase in swelling and/or redness or warmth at the area your surgery was performed which isnt relieved by rest, ice, and elevation. 2. Oral temperature greater than 101 degrees for 12 hours or more which isnt relieved by an increase in fluid intake and taking 2 Tylenol every 4-6 hours. 3. Excessive drainage from your incisions, or drainage which hasnt stopped by 72 hours after your surgery. 4. Fever, chills, shortness of breath, chest pain, nausea, vomiting or other signs and symptoms which are of concern to you. YOUR TOTAL JOINT REPLACEMENT  FREQUENTLY ASKED QUESTIONS   What should I take for pain?  o You will be discharged with four medications for pain (Oxycodone, Tramadol, Ibuprofen and Tylenol). These may vary slightly depending on what you were taking in the hospital.   - 1st Line - Tylenol Arthritis Strength - 325-650 mg every 4 hours (scheduled for the 1st 24 hours)  - 2nd Line -  Ibuprofen 400 (2 tabs) - 800 (4 tabs) mg every 8 hours  (scheduled for the 1st 24 hours)  - 3rd Line - Oxycodone 5 mg (1-2 tablets every 4-6 hrs)  - 4th Line - Tramadol 50 mg (1-2 tablets every 4-6 hours) - take these between Oxycodone doses if your pain is not alleviated.  When should I call for advice regarding my pain?  o If your pain is still uncontrolled after being on the regimen above for at least 12 hours, please call the office [phone] or text / call my cell after hours [phone].  Can I get refills?  o Opioid refills are provided for the first 2-6 weeks following surgery. o Use Tylenol 500 mg along with Aleve 220mg twice daily or Motrin 200-800mg every 4-6 hours during the daytime hours after two weeks. - After two weeks, I suggest the opioid pain medications be used only 1 hour prior to your physical therapy appointment and 1 hour before sleeping at night. Use Tylenol and Ibuprofen at other times during the day.    - Keep in mind that you will need to discontinue opioids before you resume driving.  Is swelling normal?  o Almost everyone has some degree of swelling following surgery. o Following hip and knee replacement surgery, swelling can be normal below the incision for the first few weeks. - This swelling peaks around 5-7 days after surgery. - It is not unusual to have some bruising about the back of the thigh, calf, ankle, and foot.  What should I do for the swelling?  o Keep the limb elevated above the level of your heart - 'Toes above Nose'. o Apply compression socks (knee high for total knees and up to the mid-thigh for total hips). o Use the ice packs that you are discharged home with several times a day for the first several weeks.  How long should I remain on blood thinners following surgery? o 30 days   Which blood thinners will I be on? Can I take them with Tylenol?  o  Aspirin 81 mg twice daily - these should be taken with meals and can be used with Tylenol. In certain instances, you may be sent home on Lovenox for 30 days. o For short periods of time (30 days), aspirin and anti-inflammatories (i.e. Aleve, Motrin / Advil / ibuprofen, diclofenac, etc. can be taken together).  When can I drive?  o Once you have stopped using regular narcotic pain medications (Oxycodone, Percocet, Lortab, Norco etc.) and can safely apply the brakes without hesitation, (emergency braking).  When can I shower?  o You may shower immediately if your Optifoam bandage is dry and without discharge. The Optifoam dressing should be removed 7 days following surgery, after which you may continue to shower.  o No submersion of the incision, bathing or swimming for 14 days following surgery or until cleared by Dr Homero Solis.  Can I remove this dressing?  o Yes, this is removed just like removing a band aid.  o If you are concerned, this can be removed by your therapist.   [facility] What do I do with the dressing when I shower?  o The Optifoam dressing is waterproof and you may shower with it. o The incision is sealed with Dermabond, a biologic skin glue, which also serves as a watertight seal. If your incision is draining, it is no longer considered to be watertight - you should contact our office prior to showering if you experience any drainage.  Which dressing should I purchase after I remove my Optifoam?  o An occlusive dressing which covers your entire incision. This does not have to be waterproof, but will need to be removed when you shower and then replaced. (Example Only)   How active should I be following surgery? o Progress activities in moderation and at your own pace.   o Walking room to room in your house is encouraged. o Walk each day and set progressive goals with small increments (1st week - ?block of walking, 2nd week - 1 block, 3rd week - 2 blocks, etc.)   Will I need help at home?  o You will likely need a caretaker who should be available for the first week following surgery. It is fine for family members to work during the day, as long as they are available by phone. o Planning ahead makes coming home from the hospital a much easier transition.  How long will my surgery take?  o On average, total joint replacement takes approximately 1-2 hour.   o The entire process, including pre-op and post-op care can last as long as 4- 5 hours before you are transferred to your room. o stroke, pulmonary embolism (a clot going from the legs to the lungs), and even death with surgery.  Will I be given antibiotics? Will I need antibiotics at discharge?  o Antibiotics will be given to you both before and after your procedure. To further minimize the risk of infection, we have streamlined the surgical procedure to take less time in the operating room.    o You do not require antibiotics following surgery.       Please do not hesitate to contact me through [employer] or by text / call me at [phone] (cell phone) for questions following surgery - MD Judith Phillips, MD  Cell [phone]  Holden Canales PA-C  Cell [phone]  Medical Assistants: Carol Salas & Raheel Rogers [phone]

## 2022-06-07 VITALS
SYSTOLIC BLOOD PRESSURE: 115 MMHG | HEIGHT: 67 IN | TEMPERATURE: 98 F | RESPIRATION RATE: 18 BRPM | BODY MASS INDEX: 32.28 KG/M2 | WEIGHT: 205.69 LBS | DIASTOLIC BLOOD PRESSURE: 68 MMHG | OXYGEN SATURATION: 96 % | HEART RATE: 75 BPM

## 2022-06-07 LAB
ANION GAP SERPL CALC-SCNC: 4 MMOL/L (ref 5–15)
BUN SERPL-MCNC: 20 MG/DL (ref 6–20)
BUN/CREAT SERPL: 17 (ref 12–20)
CALCIUM SERPL-MCNC: 8.9 MG/DL (ref 8.5–10.1)
CHLORIDE SERPL-SCNC: 110 MMOL/L (ref 97–108)
CO2 SERPL-SCNC: 26 MMOL/L (ref 21–32)
CREAT SERPL-MCNC: 1.21 MG/DL (ref 0.55–1.02)
GLUCOSE SERPL-MCNC: 105 MG/DL (ref 65–100)
HGB BLD-MCNC: 10.4 G/DL (ref 11.5–16)
POTASSIUM SERPL-SCNC: 3.7 MMOL/L (ref 3.5–5.1)
SODIUM SERPL-SCNC: 140 MMOL/L (ref 136–145)

## 2022-06-07 PROCEDURE — 80048 BASIC METABOLIC PNL TOTAL CA: CPT

## 2022-06-07 PROCEDURE — 97535 SELF CARE MNGMENT TRAINING: CPT | Performed by: OCCUPATIONAL THERAPIST

## 2022-06-07 PROCEDURE — 36415 COLL VENOUS BLD VENIPUNCTURE: CPT

## 2022-06-07 PROCEDURE — 97530 THERAPEUTIC ACTIVITIES: CPT | Performed by: OCCUPATIONAL THERAPIST

## 2022-06-07 PROCEDURE — 97165 OT EVAL LOW COMPLEX 30 MIN: CPT | Performed by: OCCUPATIONAL THERAPIST

## 2022-06-07 PROCEDURE — 97116 GAIT TRAINING THERAPY: CPT

## 2022-06-07 PROCEDURE — 97530 THERAPEUTIC ACTIVITIES: CPT

## 2022-06-07 PROCEDURE — 74011250637 HC RX REV CODE- 250/637: Performed by: PHYSICIAN ASSISTANT

## 2022-06-07 PROCEDURE — 94761 N-INVAS EAR/PLS OXIMETRY MLT: CPT

## 2022-06-07 PROCEDURE — 74011250636 HC RX REV CODE- 250/636: Performed by: PHYSICIAN ASSISTANT

## 2022-06-07 PROCEDURE — 85018 HEMOGLOBIN: CPT

## 2022-06-07 PROCEDURE — 97161 PT EVAL LOW COMPLEX 20 MIN: CPT

## 2022-06-07 PROCEDURE — 74011250637 HC RX REV CODE- 250/637: Performed by: ORTHOPAEDIC SURGERY

## 2022-06-07 PROCEDURE — G0378 HOSPITAL OBSERVATION PER HR: HCPCS

## 2022-06-07 PROCEDURE — 74011000250 HC RX REV CODE- 250: Performed by: PHYSICIAN ASSISTANT

## 2022-06-07 RX ADMIN — GABAPENTIN 300 MG: 300 CAPSULE ORAL at 08:53

## 2022-06-07 RX ADMIN — Medication 10 ML: at 06:00

## 2022-06-07 RX ADMIN — ACETAMINOPHEN 650 MG: 325 TABLET ORAL at 00:00

## 2022-06-07 RX ADMIN — CEFAZOLIN SODIUM 2 G: 1 POWDER, FOR SOLUTION INTRAMUSCULAR; INTRAVENOUS at 05:00

## 2022-06-07 RX ADMIN — APIXABAN 2.5 MG: 2.5 TABLET, FILM COATED ORAL at 08:53

## 2022-06-07 RX ADMIN — Medication 2000 UNITS: at 08:53

## 2022-06-07 RX ADMIN — LISINOPRIL 10 MG: 5 TABLET ORAL at 08:53

## 2022-06-07 RX ADMIN — OXYCODONE 5 MG: 5 TABLET ORAL at 04:07

## 2022-06-07 RX ADMIN — FAMOTIDINE 20 MG: 20 TABLET, FILM COATED ORAL at 08:53

## 2022-06-07 RX ADMIN — ACETAMINOPHEN 650 MG: 325 TABLET ORAL at 11:04

## 2022-06-07 RX ADMIN — POLYETHYLENE GLYCOL 3350 17 G: 17 POWDER, FOR SOLUTION ORAL at 08:54

## 2022-06-07 RX ADMIN — DOCUSATE SODIUM 50MG AND SENNOSIDES 8.6MG 1 TABLET: 8.6; 5 TABLET, FILM COATED ORAL at 08:53

## 2022-06-07 RX ADMIN — OXYCODONE 10 MG: 5 TABLET ORAL at 13:36

## 2022-06-07 RX ADMIN — OXYCODONE 10 MG: 5 TABLET ORAL at 07:10

## 2022-06-07 RX ADMIN — TRIAMTERENE AND HYDROCHLOROTHIAZIDE 1 TABLET: 37.5; 25 TABLET ORAL at 08:53

## 2022-06-07 RX ADMIN — OXYCODONE 10 MG: 5 TABLET ORAL at 10:22

## 2022-06-07 RX ADMIN — ACETAMINOPHEN 650 MG: 325 TABLET ORAL at 06:00

## 2022-06-07 NOTE — PROGRESS NOTES
Problem: Patient Education: Go to Patient Education Activity  Goal: Patient/Family Education  6/7/2022 1000 by Laura Almaraz RN  Outcome: Progressing Towards Goal  6/7/2022 0957 by Laura Almaraz RN  Outcome: Progressing Towards Goal     Problem: Knee Replacement: Post-Op Day 1  Goal: Off Pathway (Use only if patient is Off Pathway)  6/7/2022 1000 by Laura Almaraz, RN  Outcome: Progressing Towards Goal  6/7/2022 0957 by Laura Almaraz RN  Outcome: Progressing Towards Goal  Goal: Activity/Safety  6/7/2022 1000 by Laura Almaraz RN  Outcome: Progressing Towards Goal  6/7/2022 0957 by Laura Almaraz, RN  Outcome: Progressing Towards Goal  Goal: Diagnostic Test/Procedures  6/7/2022 1000 by Laura Almaraz RN  Outcome: Progressing Towards Goal  6/7/2022 0957 by Lauar Almaraz RN  Outcome: Progressing Towards Goal  Goal: Nutrition/Diet  6/7/2022 1000 by Laura Almaraz RN  Outcome: Progressing Towards Goal  6/7/2022 0957 by Laura Almaraz RN  Outcome: Progressing Towards Goal  Goal: Medications  6/7/2022 1000 by Laura Almaraz RN  Outcome: Progressing Towards Goal  6/7/2022 0957 by Laura Almaraz RN  Outcome: Progressing Towards Goal  Goal: Respiratory  6/7/2022 1000 by Laura Almaraz, RN  Outcome: Progressing Towards Goal  6/7/2022 0957 by Laura Almaraz RN  Outcome: Progressing Towards Goal  Goal: Treatments/Interventions/Procedures  6/7/2022 1000 by Laura Almaraz RN  Outcome: Progressing Towards Goal  6/7/2022 0957 by Laura Almaraz RN  Outcome: Progressing Towards Goal  Goal: Psychosocial  6/7/2022 1000 by Laura Almaraz, RN  Outcome: Progressing Towards Goal  6/7/2022 0957 by Laura Almaraz RN  Outcome: Progressing Towards Goal  Goal: Discharge Planning  6/7/2022 1000 by Laura Almaraz RN  Outcome: Progressing Towards Goal  6/7/2022 0957 by Laura Almaraz, RN  Outcome: Progressing Towards Goal  Goal: *Demonstrates progressive activity  6/7/2022 1000 by Laura Almaraz RN  Outcome: Progressing Towards Goal  6/7/2022 0957 by Laura Almaraz RN  Outcome: Progressing Towards Goal  Goal: *Optimal pain control at patient's stated goal  6/7/2022 1000 by Laura Almaraz RN  Outcome: Progressing Towards Goal  6/7/2022 0957 by Laura Almaraz RN  Outcome: Progressing Towards Goal  Goal: *Hemodynamically stable  6/7/2022 1000 by Laura Almaraz RN  Outcome: Progressing Towards Goal  6/7/2022 0957 by Laura Almaraz RN  Outcome: Progressing Towards Goal  Goal: *Discharge plan identified  6/7/2022 1000 by Laura Almaraz RN  Outcome: Progressing Towards Goal  6/7/2022 0957 by Laura Almaraz RN  Outcome: Progressing Towards Goal     Problem: Knee Replacement: Post-Op Day 2  Goal: Off Pathway (Use only if patient is Off Pathway)  6/7/2022 1000 by Laura Almaraz RN  Outcome: Progressing Towards Goal  6/7/2022 0957 by Laura Almaraz RN  Outcome: Progressing Towards Goal  Goal: Activity/Safety  6/7/2022 1000 by Laura Almaraz RN  Outcome: Progressing Towards Goal  6/7/2022 0957 by Laura Almaraz RN  Outcome: Progressing Towards Goal  Goal: Diagnostic Test/Procedures  6/7/2022 1000 by Laura Almaraz RN  Outcome: Progressing Towards Goal  6/7/2022 0957 by Laura Almaraz RN  Outcome: Progressing Towards Goal  Goal: Medications  6/7/2022 1000 by Laura Almaraz, RN  Outcome: Progressing Towards Goal  6/7/2022 0957 by Laura Almaraz RN  Outcome: Progressing Towards Goal  Goal: Respiratory  6/7/2022 1000 by Laura Almaraz, RN  Outcome: Progressing Towards Goal  6/7/2022 0957 by Laura Almaraz RN  Outcome: Progressing Towards Goal  Goal: Treatments/Interventions/Procedures  6/7/2022 1000 by Laura Almaraz, RN  Outcome: Progressing Towards Goal  6/7/2022 0957 by Laura Almaraz RN  Outcome: Progressing Towards Goal  Goal: Psychosocial  6/7/2022 1000 by Hayder Bishop RN  Outcome: Progressing Towards Goal  6/7/2022 0957 by Hayder Bishop RN  Outcome: Progressing Towards Goal  Goal: *Met physical therapy criteria for discharge to the next level of care  6/7/2022 1000 by Hayder Bishop RN  Outcome: Progressing Towards Goal  6/7/2022 0957 by Hayder Bishop RN  Outcome: Progressing Towards Goal  Goal: *Optimal pain control with oral analgesia  6/7/2022 1000 by Hayder Bishop RN  Outcome: Progressing Towards Goal  6/7/2022 0957 by Hayder Bishop RN  Outcome: Progressing Towards Goal  Goal: *Hemodynamically stable  6/7/2022 1000 by Hayder Bishop RN  Outcome: Progressing Towards Goal  6/7/2022 0957 by Hayder Bishop RN  Outcome: Progressing Towards Goal  Goal: *Tolerating diet  6/7/2022 1000 by Hayder Bishop RN  Outcome: Progressing Towards Goal  6/7/2022 0957 by Hayder Bishop RN  Outcome: Progressing Towards Goal  Goal: *Patient verbalizes understanding of discharge instructions  6/7/2022 1000 by Hayder Bishop RN  Outcome: Progressing Towards Goal  6/7/2022 0957 by Hayder Bishop RN  Outcome: Progressing Towards Goal     Problem: Falls - Risk of  Goal: *Absence of Falls  Description: [facility] and appropriate interventions in the flowsheet.   6/7/2022 1000 by Hayder Bishop RN  Outcome: Progressing Towards Goal  Note: Fall Risk Interventions:  Mobility Interventions: Bed/chair exit alarm         Medication Interventions: Bed/chair exit alarm,Patient to call before getting OOB                6/7/2022 0957 by Hayder Bishop RN  Outcome: Progressing Towards Goal  Note: Fall Risk Interventions:  Mobility Interventions: Bed/chair exit alarm         Medication Interventions: Bed/chair exit alarm,Patient to call before getting OOB                   Problem: Patient Education: Go to Patient Education Activity  Goal: Patient/Family Education  6/7/2022 1000 by Larry Ards, RN  Outcome: Progressing Towards Goal  6/7/2022 0957 by Larry Ards, RN  Outcome: Progressing Towards Goal     Problem: Knee Replacement: Post-Op Day 1  Goal: Activity/Safety  6/7/2022 1000 by Larry Ards, RN  Outcome: Progressing Towards Goal  6/7/2022 0957 by Larry Ards, RN  Outcome: Progressing Towards Goal  Goal: Diagnostic Test/Procedures  6/7/2022 1000 by Larry Ards, RN  Outcome: Progressing Towards Goal  6/7/2022 0957 by Larry Ards, RN  Outcome: Progressing Towards Goal  Goal: Nutrition/Diet  6/7/2022 1000 by Larry Ards, RN  Outcome: Progressing Towards Goal  6/7/2022 0957 by Larry Ards, RN  Outcome: Progressing Towards Goal  Goal: Medications  6/7/2022 1000 by Larry Ards, RN  Outcome: Progressing Towards Goal  6/7/2022 0957 by Larry Ards, RN  Outcome: Progressing Towards Goal  Goal: Respiratory  6/7/2022 1000 by Larry Ards, RN  Outcome: Progressing Towards Goal  6/7/2022 0957 by Larry Ards, RN  Outcome: Progressing Towards Goal  Goal: Treatments/Interventions/Procedures  6/7/2022 1000 by Larry Ards, RN  Outcome: Progressing Towards Goal  6/7/2022 0957 by Larry Ards, RN  Outcome: Progressing Towards Goal  Goal: Psychosocial  6/7/2022 1000 by Larry Ards, RN  Outcome: Progressing Towards Goal  6/7/2022 0957 by Larry Ards, RN  Outcome: Progressing Towards Goal  Goal: Discharge Planning  6/7/2022 1000 by Larry Ards, RN  Outcome: Progressing Towards Goal  6/7/2022 0957 by Larry Ards, RN  Outcome: Progressing Towards Goal  Goal: *Demonstrates progressive activity  6/7/2022 1000 by Larry Ards, RN  Outcome: Progressing Towards Goal  6/7/2022 0957 by Larry Ards, RN  Outcome: Progressing Towards Goal  Goal: *Optimal pain control at patient's stated goal  6/7/2022 1000 by Gina Daily RN  Outcome: Progressing Towards Goal  6/7/2022 0957 by Gina Daily RN  Outcome: Progressing Towards Goal  Goal: *Hemodynamically stable  6/7/2022 1000 by Gina Daily RN  Outcome: Progressing Towards Goal  6/7/2022 0957 by Gina Daily RN  Outcome: Progressing Towards Goal  Goal: *Discharge plan identified  6/7/2022 1000 by Gina Daily RN  Outcome: Progressing Towards Goal  6/7/2022 0957 by Gina Daily RN  Outcome: Progressing Towards Goal

## 2022-06-07 NOTE — PROGRESS NOTES
06/07/22    Medicare Outpatient Observation Notice (MOON)/ [place] Outpatient Observation Notice (Ej Pang) provided to patient/representative with verbal explanation of the notice. Time allotted for questions regarding the notice. Patient /representative provided a completed copy of the MOON/VOON notice. Copy placed on bedside chart.

## 2022-06-07 NOTE — PROGRESS NOTES
1345: Discussed discharge instructions and summary with patient. Patient verbalized understanding of instructions and medications. Patient aware that prescriptions were sent electronically to patient's usual pharmacy. Patient signed physical copy of paperwork due to e-sign not working. Copy was made, original copy was handed to patient, and copy was placed in patient's chart. Opportunity for questions/clarification provided. VSS. IV removed with no complication. Pt took all belongings with them.

## 2022-06-07 NOTE — PROGRESS NOTES
6/7/2022  10:29 AM  Care Management Assessment      Reason for Admission: Elective - Surgery required      ICD-10-CM ICD-9-CM    1. Primary osteoarthritis of both knees  M17.0 715.16 pregabalin (Lyrica) 75 mg capsule      oxyCODONE IR (ROXICODONE) 5 mg immediate release tablet      traMADoL (ULTRAM) 50 mg tablet       Assessment:   [x]In person with pt   []Via p/c with pt   []With family member in person. Who/Relation:     []With family member via p/c. Who/Relation:   []Chart Review    RUR:  NA - OBS  Risk Level: []Low []Moderate []High  Value-based purchasing: [] Yes [] No  Bundle patient: [] Yes [] No   Specify:     Advance Directive: Full Code.    [] No AD on file. [x] AD on file. [] Current AD not on file. Copy requested. [] Requests AD, and referral submitted to [facility]. Healthcare Decision Maker:  Dangelo Steele (R) DTCAROLINA        Assessment:    Age: 67    Sex: [] Male [x]Female     Residency: [x]Private residence []Apartment []Assisted Living []LTC []Other:   Exterior Steps: 3  Interior Steps: 1 flight    Lives With: []With spouse []Other family members []Underage children []Alone []Care provider [x]Other: Friend    *Pt's friend, DTR, and sister will assist pt during recovery.     Prior functioning:  [x]Independent with ADLs and iADLS []Dependent with ADLs and iADLs []Partial dependence, Specify:     Prior DME required:  [x]None []RW []Cane []Crutches []Bedside commode []CPAP []Home O2 (Liter/Provider: ) []Nebulizer   []Shower Chair []Wheelchair []Hospital Bed []Kitty []Stair lift []Rollator []Other:    DME available: []None [x]RW [x]Cane []Crutches [x]Bedside commode []CPAP []Home O2 (Liter/Provider: ) []Nebulizer   []Shower Chair []Wheelchair []Hospital Bed []Kitty []Stair lift []Rollator []Other:    Rehab history: []None [x]Outpatient PT []Home Health (Provider/Date: ) []SNF (Provider/Date: ) []IPR (Provider/Date: ) []LTC (Provider/Date: ) []Hospice (Provider/Date: )  []Other:     Discharge Concerns: []Yes [x]No []Unknown   Describe:    Comments: Insurer:   [employer] [address] [facility] Darshan Arthur O Phone: [phone]    Subscriber: Jessica Wilson Subscriber#: [id number]    Group#: [id number] Precert#: --          PCP: Dafne Lopez   Address: [facility] [address] [address] [facility] [address] [address]   Phone number: [phone]   Current patient: [x]Yes []No   Approximate date of last visit: 03/31/2022   Access to virtual PCP visits: []Yes [x]No    Pharmacy:  [address] [facility] Transport: Family       Transition of care plan:    []Unable to determine at this time. Awaiting clinical progress, and disposition recommendations. [] Home with outpatient follow-up    [x] Home with Outpatient PT and outpatient follow-up   Pt aware of OP appt? [x]Yes, Provider: [address] [place] [address] OP PT   []Not scheduled   Transport provider: Family    [] Home with family assistance as needed and outpatient follow-up   Family able to assist:    Schedule:  Transport provider:      [] Home with Home Health   - Provider:     []SNF/IPR   -[]Preferences given:   []Listing provided and preferences requested   -Status: []Pending []Accepted:    -Auth required: []Yes []No    -Auth initiated date:   -3 midnight stay required: []Yes []No  Date satisfied:     [] Home with Hospice   -Provider:     [] Dispatch Health information provided. [] Other:     Madison Perez MA    Care Management Interventions  PCP Verified by CM: Yes Padmaja Tan)  Mode of Transport at Discharge:  Other (see comment)  Transition of Care Consult (CM Consult): Discharge Planning  MyChart Signup: No  Discharge Durable Medical Equipment: No  Physical Therapy Consult: Yes  Occupational Therapy Consult: Yes  Speech Therapy Consult: No  Support Systems: Spouse/Significant Other  Confirm Follow Up Transport: Family  Discharge Location  Patient Expects to be Discharged to[de-identified] Home with outpatient services

## 2022-06-07 NOTE — PROGRESS NOTES
Problem: Patient Education: Go to Patient Education Activity  Goal: Patient/Family Education  6/6/2022 2033 by Rober Willis RN  Outcome: Progressing Towards Goal  6/6/2022 1815 by Rober Willis RN  Outcome: Progressing Towards Goal     Problem: Knee Replacement: Day of Surgery/Unit  Goal: Off Pathway (Use only if patient is Off Pathway)  6/6/2022 2033 by Rober Willis RN  Outcome: Progressing Towards Goal  6/6/2022 1815 by Rober Willis RN  Outcome: Progressing Towards Goal  Goal: Activity/Safety  6/6/2022 2033 by Rober Willis RN  Outcome: Progressing Towards Goal  6/6/2022 1815 by Rober Willis RN  Outcome: Progressing Towards Goal  Goal: Consults, if ordered  6/6/2022 2033 by Rober Willis RN  Outcome: Progressing Towards Goal  6/6/2022 1815 by Rober Willis RN  Outcome: Progressing Towards Goal  Goal: Diagnostic Test/Procedures  6/6/2022 2033 by Rober Willis RN  Outcome: Progressing Towards Goal  6/6/2022 1815 by Rober Willis RN  Outcome: Progressing Towards Goal  Goal: Nutrition/Diet  6/6/2022 2033 by Rober Willis RN  Outcome: Progressing Towards Goal  6/6/2022 1815 by Rober Willis RN  Outcome: Progressing Towards Goal  Goal: Medications  6/6/2022 2033 by Rober Willis RN  Outcome: Progressing Towards Goal  6/6/2022 1815 by Rober Willis RN  Outcome: Progressing Towards Goal  Goal: Respiratory  6/6/2022 2033 by Rober Willis RN  Outcome: Progressing Towards Goal  6/6/2022 1815 by Rober Willis RN  Outcome: Progressing Towards Goal  Goal: Treatments/Interventions/Procedures  6/6/2022 2033 by Rober Willis RN  Outcome: Progressing Towards Goal  6/6/2022 1815 by Rober Willis RN  Outcome: Progressing Towards Goal  Goal: Psychosocial  6/6/2022 2033 by Rober Willis RN  Outcome: Progressing Towards Goal  6/6/2022 1815 by Rober Willis RN  Outcome: Progressing Towards Goal  Goal: *Initiate mobility  6/6/2022 2033 by Rober Willis RN  Outcome: Progressing Towards Goal  6/6/2022 1815 by Russell Best RN  Outcome: Progressing Towards Goal  Goal: *Optimal pain control at patient's stated goal  6/6/2022 2033 by Russell Best RN  Outcome: Progressing Towards Goal  6/6/2022 1815 by Russell Best RN  Outcome: Progressing Towards Goal  Goal: *Hemodynamically stable  6/6/2022 2033 by Russell Best RN  Outcome: Progressing Towards Goal  6/6/2022 1815 by Russell Best RN  Outcome: Progressing Towards Goal     Problem: Knee Replacement: Post-Op Day 1  Goal: Off Pathway (Use only if patient is Off Pathway)  6/6/2022 2033 by Russell Best RN  Outcome: Progressing Towards Goal  6/6/2022 1815 by Russell Best RN  Outcome: Progressing Towards Goal  Goal: Activity/Safety  6/6/2022 2033 by Russell Best RN  Outcome: Progressing Towards Goal  6/6/2022 1815 by Russell Best RN  Outcome: Progressing Towards Goal  Goal: Diagnostic Test/Procedures  6/6/2022 2033 by Russell Best RN  Outcome: Progressing Towards Goal  6/6/2022 1815 by Russell Best RN  Outcome: Progressing Towards Goal  Goal: Nutrition/Diet  6/6/2022 2033 by Russell Best RN  Outcome: Progressing Towards Goal  6/6/2022 1815 by Russell Best RN  Outcome: Progressing Towards Goal  Goal: Medications  6/6/2022 2033 by Russell Best RN  Outcome: Progressing Towards Goal  6/6/2022 1815 by Russell Best RN  Outcome: Progressing Towards Goal  Goal: Respiratory  6/6/2022 2033 by Russell Best RN  Outcome: Progressing Towards Goal  6/6/2022 1815 by Russell Best RN  Outcome: Progressing Towards Goal  Goal: Treatments/Interventions/Procedures  6/6/2022 2033 by Russell Best RN  Outcome: Progressing Towards Goal  6/6/2022 1815 by Russell Best RN  Outcome: Progressing Towards Goal  Goal: Psychosocial  6/6/2022 2033 by Russell Best RN  Outcome: Progressing Towards Goal  6/6/2022 1815 by Russell Best RN  Outcome: Progressing Towards Goal  Goal: Discharge Planning  6/6/2022 2033 by Renee Rivera RN  Outcome: Progressing Towards Goal  6/6/2022 1815 by Renee Rivera RN  Outcome: Progressing Towards Goal  Goal: *Demonstrates progressive activity  6/6/2022 2033 by Renee Rivera RN  Outcome: Progressing Towards Goal  6/6/2022 1815 by Renee Rivera RN  Outcome: Progressing Towards Goal  Goal: *Optimal pain control at patient's stated goal  6/6/2022 2033 by Renee Rivera RN  Outcome: Progressing Towards Goal  6/6/2022 1815 by Renee Rivera RN  Outcome: Progressing Towards Goal  Goal: *Hemodynamically stable  6/6/2022 2033 by Renee Rivera RN  Outcome: Progressing Towards Goal  6/6/2022 1815 by Renee Rivera RN  Outcome: Progressing Towards Goal  Goal: *Discharge plan identified  6/6/2022 2033 by Renee Rivera RN  Outcome: Progressing Towards Goal  6/6/2022 1815 by Renee Rivera RN  Outcome: Progressing Towards Goal     Problem: Knee Replacement: Post-Op Day 2  Goal: Off Pathway (Use only if patient is Off Pathway)  6/6/2022 2033 by Renee Rivera RN  Outcome: Progressing Towards Goal  6/6/2022 1815 by Renee Rivera RN  Outcome: Progressing Towards Goal  Goal: Activity/Safety  6/6/2022 2033 by Renee Rivera RN  Outcome: Progressing Towards Goal  6/6/2022 1815 by Renee Rivera RN  Outcome: Progressing Towards Goal  Goal: Diagnostic Test/Procedures  6/6/2022 2033 by Renee Rivera RN  Outcome: Progressing Towards Goal  6/6/2022 1815 by Renee Rivera RN  Outcome: Progressing Towards Goal  Goal: Medications  6/6/2022 2033 by Renee Rivera RN  Outcome: Progressing Towards Goal  6/6/2022 1815 by Renee Rivera RN  Outcome: Progressing Towards Goal  Goal: Respiratory  6/6/2022 2033 by Renee Rivera RN  Outcome: Progressing Towards Goal  6/6/2022 1815 by Renee Rivera RN  Outcome: Progressing Towards Goal  Goal: Treatments/Interventions/Procedures  6/6/2022 2033 by Renee Rivera RN  Outcome: Progressing Towards Goal  6/6/2022 1815 by Brigitte Martinez RN  Outcome: Progressing Towards Goal  Goal: Psychosocial  6/6/2022 2033 by Brigitte Martinez RN  Outcome: Progressing Towards Goal  6/6/2022 1815 by Brigitte Martinez RN  Outcome: Progressing Towards Goal  Goal: *Met physical therapy criteria for discharge to the next level of care  6/6/2022 2033 by Brigitte Martinez RN  Outcome: Progressing Towards Goal  6/6/2022 1815 by Brigitte Martinez RN  Outcome: Progressing Towards Goal  Goal: *Optimal pain control with oral analgesia  6/6/2022 2033 by Brigitte Martinez RN  Outcome: Progressing Towards Goal  6/6/2022 1815 by Brigitte Martinez RN  Outcome: Progressing Towards Goal  Goal: *Hemodynamically stable  6/6/2022 2033 by Brigitte Martinez RN  Outcome: Progressing Towards Goal  6/6/2022 1815 by Brigitte Martinez RN  Outcome: Progressing Towards Goal  Goal: *Tolerating diet  6/6/2022 2033 by Brigitte Martinez RN  Outcome: Progressing Towards Goal  6/6/2022 1815 by Brigitte Martinez RN  Outcome: Progressing Towards Goal  Goal: *Patient verbalizes understanding of discharge instructions  6/6/2022 2033 by Brigitte Martinez RN  Outcome: Progressing Towards Goal  6/6/2022 1815 by Brigitte Martinez RN  Outcome: Progressing Towards Goal     Problem: Falls - Risk of  Goal: *Absence of Falls  Description: Document Cleave Payne Fall Risk and appropriate interventions in the flowsheet.   Outcome: Progressing Towards Goal  Note: Fall Risk Interventions:  Mobility Interventions: Bed/chair exit alarm,Communicate number of staff needed for ambulation/transfer,Patient to call before getting OOB,Utilize walker, cane, or other assistive device,Utilize gait belt for transfers/ambulation                             Problem: Patient Education: Go to Patient Education Activity  Goal: Patient/Family Education  Outcome: Progressing Towards Goal

## 2022-06-07 NOTE — PROGRESS NOTES
Occupational Therapy EVALUATION/discharge  Patient: Quin Terrell (8 y.o. female)  Date: 6/7/2022  Primary Diagnosis: Primary osteoarthritis of left knee [M17.12]  Procedure(s) (LRB):  LEFT TOTAL KNEE ARTHROPLASTY MAKOPLASTY (FAST TRACK) (Left) 1 Day Post-Op   Precautions: WBAT,Other (comment) (no knee flexion > 90 degrees for operative knee)    ASSESSMENT:   Based on the objective data described below, the patient presents with good activity tolerance and participation. Hx of right hip replacement and has adaptive equipment for all ADL's from that sx. Reports she will have some assist at home. Demonstrated good balance with rolling walker for ADL transfers. Patient on target for discharge this date and therapist in agreement. Further skilled acute occupational therapy is not indicated at this time. Discharge Recommendations:   Further Equipment Recommendations for Discharge: none      SUBJECTIVE:   Patient stated how long should I lay in bed? Taty Thoams    OBJECTIVE DATA SUMMARY:   HISTORY:   Past Medical History:   Diagnosis Date    Arthritis     Hypertension     Other and unspecified hyperlipidemia     Pre-diabetes     Unspecified vitamin D deficiency      Past Surgical History:   Procedure Laterality Date    HX HIP REPLACEMENT Right 05/04/2020    HX HYSTERECTOMY  1983    HX LUMBAR FUSION  2021       Prior Level of Function/Environment/Context: independent, reported walking regularly outside, has a cane but did not use, reported she was supposed to though.  Has equipment from right hip replacement  Expanded or extensive additional review of patient history:     Home Situation  Home Environment: Private residence  # Steps to Enter: 3  Rails to Enter: Yes  Hand Rails : Bilateral  One/Two Story Residence: Two story  # of Interior Steps: [id number] [facility] Floor: Right  Living Alone: No  Support Systems: Spouse/Significant Other  Patient Expects to be Discharged to[de-identified] Home with outpatient services  Current DME Used/Available at Home: Walker, rolling,Cane, straight    Hand dominance:     EXAMINATION OF PERFORMANCE DEFICITS:  Cognitive/Behavioral Status:  Neurologic State: Alert  Orientation Level: Oriented X4  Cognition: Follows commands; Appropriate safety awareness; Appropriate for age attention/concentration; Appropriate decision making  Perception: Appears intact  Perseveration: No perseveration noted  Safety/Judgement: Awareness of environment; Fall prevention;Home safety; Insight into deficits    Skin: dressing intact, educated on skin protection    Edema: left LE, Ice in place    Hearing: Auditory  Auditory Impairment: None    Vision/Perceptual:                                     Range of Motion:  AROM: Within functional limits  PROM: Generally decreased, functional                      Strength:  Strength: Generally decreased, functional                Coordination:  Coordination: Within functional limits  Fine Motor Skills-Upper: Left Intact; Right Intact    Gross Motor Skills-Upper: Left Intact; Right Intact         Balance:  Sitting: Intact  Standing: Intact; With support  Standing - Static: Good  Standing - Dynamic : Fair;Constant support    Functional Mobility and Transfers for ADLs:  Bed Mobility:  Rolling: Independent  Supine to Sit: Modified independent  Sit to Supine: Independent  Scooting: Modified independent    Transfers:  Sit to Stand: Modified independent  Stand to Sit: Modified independent  Bed to Chair: Supervision; Adaptive equipment;Assist x1;Additional time  Bathroom Mobility: Supervision/set up  Toilet Transfer : Supervision; Adaptive equipment;Assist x1    ADL Assessment:  Feeding: Independent    Oral Facial Hygiene/Grooming: Supervision;Modified Independent (standing at sink)         Upper Body Dressing: Independent    Lower Body Dressing: Supervision; Adaptive equipment; Other (comment) (educated on use of hip kit and sequencing)    Toileting: Supervision                ADL Intervention and task modifications:          Educated on role of OT, positioning of LE in supine and sitting, benefit of movement to prevent stiffness, precaution to avoid knee flexion > 90 degrees until cleared by MD    Educated on use of ice: frequency, duration and skin protection      Patient instructed and indicated understanding the benefits of maintaining activity tolerance, functional mobility, and independence with self care tasks during acute stay  to ensure safe return home and to baseline. Encouraged patient to increase frequency and duration OOB, be out of bed for all meals, perform daily ADLs (as approved by RN/MD regarding bathing etc), and performing functional mobility to/from bathroom.      Cognitive Retraining  Safety/Judgement: Awareness of environment; Fall prevention;Home safety; Insight into deficits    Bathing: Patient instructed and indicated understanding when bathing to not submerge wound in water, stand to shower or sponge bathe, cover wound with plastic and tape to ensure no water reaches bandage/wound without cues. Dressing joint: Patient instructed and demonstrated understanding to don/doff Left LE first/last with Supervision. Patient instructed and demonstrated to don all clothing while sitting prior to standing, doff all clothing to knees while standing, then sit to doff clothing off from knees to feet in order to facilitate fall prevention, pain management, and energy conservation with Supervision and Additional time. Using reacher to thread pants  Home safety: Patient instructed and indicated understanding on home modifications and safety (raise height of ADL objects, appropriate height of chair surfaces, recliner safety, change of floor surfaces, clear pathways) to increase independence and fall prevention. Standing: Patient instructed and demonstrated during ADLs to walk up to sink/counter top/surfaces, step into walker to increase safety of joint and fall prevention with Supervision.  Patient educated about knee anatomy verbally and with pictures and educated to avoid rotation of Left LE. Instructed to apply concept to ADLs within the home (no twisting of knee during reaching across body, square off while using objects, slide objects along surfaces). Patient instructed and indicated understanding to increase amount of time standing, observe standing position during ADLs in order to increase even weight bearing through bilateral LEs in order to increase independence with ADLs. Goal to be reached 30 days post - op, per orthopedic surgeon or per PT. Functional Measure:    Barthel Index:  Bathin  Bladder: 10  Bowels: 10  Groomin  Dressin  Feeding: 10  Mobility: 10  Stairs: 5  Toilet Use: 5  Transfer (Bed to Chair and Back): 10  Total: 70/100      The Barthel ADL Index: Guidelines  1. The index should be used as a record of what a patient does, not as a record of what a patient could do. 2. The main aim is to establish degree of independence from any help, physical or verbal, however minor and for whatever reason. 3. The need for supervision renders the patient not independent. 4. A patient's performance should be established using the best available evidence. Asking the patient, friends/relatives and nurses are the usual sources, but direct observation and common sense are also important. However direct testing is not needed. 5. Usually the patient's performance over the preceding 24-48 hours is important, but occasionally longer periods will be relevant. 6. Middle categories imply that the patient supplies over 50 per cent of the effort. 7. Use of aids to be independent is allowed. Score Interpretation (from [address] [facility] 83)    Independent   60-79 Minimally independent   40-59 Partially dependent   20-39 Very dependent   <20 Totally dependent     -Silvio Orr., Barthel, D.W. (1965). Functional evaluation: the Barthel Index. [facility] ([address] [place] [address] (1997). The Barthel activities of daily living index: self-reporting versus actual performance in the old (> or = 75 years). Journal [address] 45(1), [facility], SHANAE, Hilario Corona.[employer]gr[employer]. (1999). Measuring the change in disability after inpatient rehabilitation; comparison of the responsiveness of the Barthel Index and Functional [place] Measure. Journal of Neurology, Neurosurgery, and Psychiatry, 66(4), 539-495. REKHA Tavares, ANNELIESE Calles, & Ofelia Gonsales M.A. (2004) Assessment of post-stroke quality of life in cost-effectiveness studies: The usefulness of the Barthel Index and the EuroQoL-5D. Quality of Life Research, 15, 255-52          Occupational Therapy Evaluation Charge Determination   History Examination Decision-Making   LOW Complexity : Brief history review  LOW Complexity : 1-3 performance deficits relating to physical, cognitive , or psychosocial skils that result in activity limitations and / or participation restrictions  MEDIUM Complexity : Patient may present with comorbidities that affect occupational performnce. Miniml to moderate modification of tasks or assistance (eg, physical or verbal ) with assesment(s) is necessary to enable patient to complete evaluation       Based on the above components, the patient evaluation is determined to be of the following complexity level: LOW   Pain:    no complaint, ice in place  Activity Tolerance:   Good to fair    Please refer to the flowsheet for vital signs taken during this treatment. After treatment:   [x]  Patient left in no apparent distress sitting up in chair  []  Patient left in no apparent distress in bed  [x]  Call bell left within reach  [x]  Nursing notified  []  Caregiver present  []  Bed alarm activated    COMMUNICATION/EDUCATION:   Communication/Collaboration:  [x]      Home safety education was provided and the patient/caregiver indicated understanding.   [x] Patient/family have participated as able and agree with findings and recommendations. []      Patient is unable to participate in plan of care at this time.   Findings and recommendations were discussed with: Physical Therapist and Registered Nurse    SHRUTHI Erickson/MONET  Time Calculation: 39 mins

## 2022-06-07 NOTE — PROGRESS NOTES
Problem: Patient Education: Go to Patient Education Activity  Goal: Patient/Family Education  Outcome: Progressing Towards Goal     Problem: Knee Replacement: Post-Op Day 1  Goal: Off Pathway (Use only if patient is Off Pathway)  Outcome: Progressing Towards Goal  Goal: Activity/Safety  Outcome: Progressing Towards Goal  Goal: Diagnostic Test/Procedures  Outcome: Progressing Towards Goal  Goal: Nutrition/Diet  Outcome: Progressing Towards Goal  Goal: Medications  Outcome: Progressing Towards Goal  Goal: Respiratory  Outcome: Progressing Towards Goal  Goal: Treatments/Interventions/Procedures  Outcome: Progressing Towards Goal  Goal: Psychosocial  Outcome: Progressing Towards Goal  Goal: Discharge Planning  Outcome: Progressing Towards Goal  Goal: *Demonstrates progressive activity  Outcome: Progressing Towards Goal  Goal: *Optimal pain control at patient's stated goal  Outcome: Progressing Towards Goal  Goal: *Hemodynamically stable  Outcome: Progressing Towards Goal  Goal: *Discharge plan identified  Outcome: Progressing Towards Goal     Problem: Knee Replacement: Post-Op Day 2  Goal: Off Pathway (Use only if patient is Off Pathway)  Outcome: Progressing Towards Goal  Goal: Activity/Safety  Outcome: Progressing Towards Goal  Goal: Diagnostic Test/Procedures  Outcome: Progressing Towards Goal  Goal: Medications  Outcome: Progressing Towards Goal  Goal: Respiratory  Outcome: Progressing Towards Goal  Goal: Treatments/Interventions/Procedures  Outcome: Progressing Towards Goal  Goal: Psychosocial  Outcome: Progressing Towards Goal  Goal: *Met physical therapy criteria for discharge to the next level of care  Outcome: Progressing Towards Goal  Goal: *Optimal pain control with oral analgesia  Outcome: Progressing Towards Goal  Goal: *Hemodynamically stable  Outcome: Progressing Towards Goal  Goal: *Tolerating diet  Outcome: Progressing Towards Goal  Goal: *Patient verbalizes understanding of discharge instructions  Outcome: Progressing Towards Goal     Problem: Falls - Risk of  Goal: *Absence of Falls  Description: Document Virginia Rogel Fall Risk and appropriate interventions in the flowsheet.   Outcome: Progressing Towards Goal  Note: Fall Risk Interventions:  Mobility Interventions: Bed/chair exit alarm         Medication Interventions: Bed/chair exit alarm,Patient to call before getting OOB                   Problem: Patient Education: Go to Patient Education Activity  Goal: Patient/Family Education  Outcome: Progressing Towards Goal     Problem: Knee Replacement: Post-Op Day 1  Goal: Activity/Safety  Outcome: Progressing Towards Goal  Goal: Diagnostic Test/Procedures  Outcome: Progressing Towards Goal  Goal: Nutrition/Diet  Outcome: Progressing Towards Goal  Goal: Medications  Outcome: Progressing Towards Goal  Goal: Respiratory  Outcome: Progressing Towards Goal  Goal: Treatments/Interventions/Procedures  Outcome: Progressing Towards Goal  Goal: Psychosocial  Outcome: Progressing Towards Goal  Goal: Discharge Planning  Outcome: Progressing Towards Goal  Goal: *Demonstrates progressive activity  Outcome: Progressing Towards Goal  Goal: *Optimal pain control at patient's stated goal  Outcome: Progressing Towards Goal  Goal: *Hemodynamically stable  Outcome: Progressing Towards Goal  Goal: *Discharge plan identified  Outcome: Progressing Towards Goal

## 2022-06-07 NOTE — PROGRESS NOTES
Spiritual Care Assessment/Progress Note  [address]      NAME: Quin Terrell      MRN: [id number]  AGE: 67 y.o.  SEX: female  Yarsanism Affiliation: No preference   Language: English     6/7/2022     Total Time (in minutes): 21     Spiritual Assessment begun in [facility] 4M POST SURG ORT 1 through conversation with:         [x]Patient        [] Family    [] Friend(s)        Reason for Consult: Request by staff     Spiritual beliefs: (Please include comment if needed)     [x] Identifies with a ingris tradition:    Hien      [] Supported by a ingris community:            [] Claims no spiritual orientation:           [] Seeking spiritual identity:                [] Adheres to an individual form of spirituality:           [] Not able to assess:                           Identified resources for coping:      [x] Prayer                               [] Music                  [] Guided Imagery     [x] Family/friends                 [] Pet visits     [] Devotional reading                         [] Unknown     [] Other:                                              Interventions offered during this visit: (See comments for more details)    Patient Interventions: Affirmation of emotions/emotional suffering,Affirmation of ingris,Christian/blessing,Catharsis/review of pertinent events in supportive environment,Initial/Spiritual assessment, patient floor,Life review/legacy,Normalization of emotional/spiritual concerns,Prayer (assurance of),Prayer (actual),Yarsanism beliefs/image of God discussed           Plan of Care:     [] Support spiritual and/or cultural needs    [] Support AMD and/or advance care planning process      [] Support grieving process   [] Coordinate Rites and/or Rituals    [] Coordination with community clergy   [] No spiritual needs identified at this time   [] Detailed Plan of Care below (See Comments)  [] Make referral to Music Therapy  [] Make referral to Pet Therapy     [] Make referral to Addiction services  [] Make referral to [facility]  [] Make referral to Spiritual Care Partner  [] No future visits requested        [x] Contact Spiritual Care for further referrals     Comments:   Spiritual Consult for prayer for Ms. Aiden Santana on 4M post surg unit. Pt resting in bed awaiting PT and possible discharge today. She warmly welcomes  and shares that she lives with a friend, has a daughter with 2 children. Her  passed about 9 years ago and she had a son pass away too. Her daughter is a  and Ms Ana Soler will sometimes attend [employer] with her friend. She expressed feeling content and optimistic. Feels she has a strong ingris. PT is ready to work with her, so  held prayer with her and she expressed much gratitude.      Chaplain Lev Ness M.Div.  Tahira Bauer ([id number])

## 2022-06-07 NOTE — PROGRESS NOTES
Bedside shift change report given to Ariella (oncoming nurse) by Marcelo Bhatti (offgoing nurse). Report included the following information SBAR, Kardex, MAR and Recent Results.

## 2022-06-07 NOTE — PROGRESS NOTES
1900- Bedside and Verbal shift change report given to Cat RN (oncoming nurse) by Robert Day (offgoing nurse). Report included the following information SBAR, Kardex, OR Summary, Intake/Output, MAR, Recent Results, Med Rec Status, Alarm Parameters  and Dual Neuro Assessment     Pt stood and pivot to the beside commode and voided well. Tolerated well. Mild complaints of pain overnight, no acute issues. 0700- Bedside and Verbal shift change report given to Lora Stokes (oncoming nurse) by Cat RN (offgoing nurse). Report included the following information SBAR, Kardex, OR Summary, Procedure Summary, Intake/Output, MAR, Recent Results, Alarm Parameters  and Dual Neuro Assessment.

## 2022-06-07 NOTE — PROGRESS NOTES
The patient was provided a virtual link to view the pre-operative Joint Replacement Class Video  A Patient Education Book specific to total hip or knee joint replacement surgery was given to the patient in [facility]. The content of the class was presented using an audio power point presentation specific for patients undergoing total hip and knee replacement surgery. Incentive spirometer and CHG bath kits were verbally reviewed. Day of surgery routine and expectations, hospital routine and expectations, nutrition, alcohol, nicotine, medications, infection control, pain management, DVT precautions and equipment, ice therapy, durable medical equipment, exercises, mobility expectations and precautions, home preparation and safety were reviewed in class video. My contact information was shared with the patient to provide further information as requested by the patient related to their upcoming surgery.    Patient states confirmation that they viewed the joint class video prior to sugtimo

## 2022-06-07 NOTE — FACE TO FACE
Leader rounding and   Rounded on patient  Patient alert and oriented  Follow up from pre-op Joint Patient Education Class. Patient states class information was valuable in preparing for surgery. Patient states their home space is prepared and safe for recovery. Reviewed ice application, leg positioning, exercises and incentive spirometry use. Opportunity provided for patient to ask questions and provide comments. Questions answered.

## 2022-06-07 NOTE — PROGRESS NOTES
Problem: Mobility Impaired (Adult and Pediatric)  Goal: *Acute Goals and Plan of Care (Insert Text)  Description: FUNCTIONAL STATUS PRIOR TO ADMISSION: Patient was independent and active without use of DME.    HOME SUPPORT PRIOR TO ADMISSION: The patient lived with friend but did not require assist.    Physical Therapy Goals  Initiated 6/7/2022    1. Patient will move from supine to sit and sit to supine  in bed with independence within 4 days. 2. Patient will perform sit to stand with modified independence within 4 days. 3. Patient will ambulate with modified independence for 50 feet with the least restrictive device within 4 days. 4. Patient will ascend/descend 3 stairs with bilateral handrail(s) with supervision/set-up within 4 days. 5. Patient will perform home exercise program per protocol with independence within 4 days. Outcome: Progressing Towards Goal  Note:   PHYSICAL THERAPY EVALUATION  Patient: Scout Gaming (26 y.o. female)  Date: 6/7/2022  Primary Diagnosis: Primary osteoarthritis of left knee [M17.12]  Procedure(s) (LRB):  LEFT TOTAL KNEE ARTHROPLASTY MAKOPLASTY (FAST TRACK) (Left) 1 Day Post-Op   Precautions:  Fall    ASSESSMENT  Based on the objective data described below, the patient presents with decreased independence with functional mobility and activity tolerance. Pt is day 1 post-op L TKR. Pt was independent prior to admission and has good social support from friend that lives with her. Pt was able to complete active straight leg raise, noted mild extensor lag. L Knee ROM 87 deg flexion, 2 deg excess extension. Pt required supervision for bed mobility with verbal cues for sequencing and CGA for transfers and ambulation with RW up to 10'. Pt is highly motivated and future session will progress ambulation distance and begin stair training. All vitals stable throughout session, no dizziness with OOB mobility.  Pt will receive BID PT while in house and is appropriate for outpatient PT services upon D/C. Current Level of Function Impacting Discharge (mobility/balance): CGA 10' ambulation with RW    Functional Outcome Measure: The patient scored 15/28 on the Tinetti outcome measure which is indicative of high fall risk. Patient will benefit from skilled therapy intervention to address the above noted impairments. PLAN :  Recommendations and Planned Interventions: bed mobility training, transfer training, gait training, therapeutic exercises, neuromuscular re-education, edema management/control, patient and family training/education, and therapeutic activities      Frequency/Duration: Patient will be followed by physical therapy:  twice daily to address goals. Recommendation for discharge: (in order for the patient to meet his/her long term goals)  Outpatient physical therapy follow up recommended for TKE rehabilitation    This discharge recommendation:  A follow-up discussion with the attending provider and/or case management is planned    IF patient discharges home will need the following DME: none; Pt's friend to bring in [address] [place] [address] for sizing         SUBJECTIVE:   Patient stated it's really not bad.  re: knee pain    OBJECTIVE DATA SUMMARY:   HISTORY:    Past Medical History:   Diagnosis Date    Arthritis     Hypertension     Other and unspecified hyperlipidemia     Pre-diabetes     Unspecified vitamin D deficiency      Past Surgical History:   Procedure Laterality Date    HX HIP REPLACEMENT Right 05/04/2020    HX HYSTERECTOMY  1983    HX LUMBAR FUSION  2021       Personal factors and/or comorbidities impacting plan of care: see above    Home Situation  Home Environment: Private residence  # Steps to Enter: 3  Rails to Enter: Yes  Hand Rails : Bilateral  One/Two Story Residence: Two story  # of Interior Steps: 13  Interior Rails: Right  Living Alone: No  Support Systems: Friend/Neighbor,Child(dionisio)  Patient Expects to be Discharged to[de-identified] Home  Current DME Used/Available at Home: Walker, rolling,Cane, straight    EXAMINATION/PRESENTATION/DECISION MAKING:   Critical Behavior:  Neurologic State: Alert  Orientation Level: Oriented X4  Cognition: Follows commands     Range Of Motion:  AROM: Generally decreased, functional        LLE AROM  L Knee Flexion: 87  L Knee Extension: 2 (2 degrees excess)  PROM: Generally decreased, functional           Strength:    Strength: Generally decreased, functional          Coordination:  Coordination: Generally decreased, functional    Functional Mobility:  Bed Mobility:  Rolling: Supervision  Supine to Sit: Supervision  Sit to Supine: Supervision  Scooting: Supervision  Transfers:  Sit to Stand: Contact guard assistance  Stand to Sit: Contact guard assistance  Stand Pivot Transfers: Contact guard assistance                    Balance:   Sitting: Intact  Standing: Impaired  Standing - Static: Fair  Standing - Dynamic : Fair  Ambulation/Gait Training:  Distance (ft): 10 Feet (ft)  Assistive Device: Gait belt;Walker, rolling  Ambulation - Level of Assistance: Contact guard assistance          Therapeutic Exercises: Active straight leg raise x6  Quad set x5  Ankle pumps x10   Heel slide x3    Functional Measure:  Tinetti test:    Sitting Balance: 1  Arises: 1  Attempts to Rise: 2  Immediate Standing Balance: 1  Standing Balance: 1  Nudged: 1  Eyes Closed: 1  Turn 360 Degrees - Continuous/Discontinuous: 1  Turn 360 Degrees - Steady/Unsteady: 1  Sitting Down: 1  Balance Score: 11 Balance total score  Indication of Gait: 0  R Step Length/Height: 1  L Step Length/Height: 0  R Foot Clearance: 1  L Foot Clearance: 0  Step Symmetry: 0  Step Continuity: 0  Path: 1  Trunk: 1  Walking Time: 0  Gait Score: 4 Gait total score  Total Score: 15/28 Overall total score         Tinetti Tool Score Risk of Falls  <19 = High Fall Risk  19-24 = Moderate Fall Risk  25-28 = Low Fall Risk  Aldoetti ME. Performance-Oriented Assessment of Mobility Problems in Elderly Patients.  [place] 65; [phone]. (Scoring Description: PT Bulletin Feb. 10, 1993)    Older adults: Jhoanadavid Bishop et al, 2009; n = 1000 [facility] elderly evaluated with ABC, JOSEFINA, ADL, and IADL)  · Mean JOSEFINA score for males aged 69-68 years = 26.21(3.40)  · Mean JOSEFINA score for females age 69-68 years = 25.16(4.30)  · Mean JOSEFINA score for males over 80 years = 23.29(6.02)  · Mean JOSEFINA score for females over 80 years = 17.20(8.32)           Physical Therapy Evaluation Charge Determination   History Examination Presentation Decision-Making   LOW Complexity : Zero comorbidities / personal factors that will impact the outcome / POC LOW Complexity : 1-2 Standardized tests and measures addressing body structure, function, activity limitation and / or participation in recreation  LOW Complexity : Stable, uncomplicated  Other outcome measures Tinetti 15/28  MEDIUM      Based on the above components, the patient evaluation is determined to be of the following complexity level: LOW     Pain Ratin/10, no complaints throughout session. Pre-coordinated medication with RN. Activity Tolerance:   Fair and requires rest breaks    After treatment patient left in no apparent distress:   Supine in bed, Call bell within reach, Bed / chair alarm activated, and Side rails x 3    COMMUNICATION/EDUCATION:   The patients plan of care was discussed with: Registered nurse. Fall prevention education was provided and the patient/caregiver indicated understanding., Patient/family have participated as able in goal setting and plan of care. , and Patient/family agree to work toward stated goals and plan of care.     Thank you for this referral.  Michelle Bhatia, PT, DPT   Time Calculation: 34 mins

## 2022-06-07 NOTE — PROGRESS NOTES
Problem: Mobility Impaired (Adult and Pediatric)  Goal: *Acute Goals and Plan of Care (Insert Text)  Description: FUNCTIONAL STATUS PRIOR TO ADMISSION: Patient was independent and active without use of DME.    HOME SUPPORT PRIOR TO ADMISSION: The patient lived with friend but did not require assist.    Physical Therapy Goals  Initiated 6/7/2022    1. Patient will move from supine to sit and sit to supine  in bed with independence within 4 days. 2. Patient will perform sit to stand with modified independence within 4 days. 3. Patient will ambulate with modified independence for 50 feet with the least restrictive device within 4 days. 4. Patient will ascend/descend 3 stairs with bilateral handrail(s) with supervision/set-up within 4 days. 5. Patient will perform home exercise program per protocol with independence within 4 days. 6/7/2022 1151 by Hoa Walls PT  Outcome: Progressing Towards Goal  Note:   PHYSICAL THERAPY TREATMENT  Patient: Letty Lopez (65 y.o. female)  Date: 6/7/2022  Diagnosis: Primary osteoarthritis of left knee [M17.12] <principal problem not specified>  Procedure(s) (LRB):  LEFT TOTAL KNEE ARTHROPLASTY MAKOPLASTY (FAST TRACK) (Left) 1 Day Post-Op  Precautions: Fall  Chart, physical therapy assessment, plan of care and goals were reviewed. ASSESSMENT  Patient continues with skilled PT services and is progressing towards goals. Patient was independent with bed mobility this session and showed great improvement with transfers with consistent modified independence with rolling walker. Pt progressed to ambulating [de-identified]' with supervision with a RW with improved AD management and step through pattern. Stair training completed; pt safely a/descended 4 steps with SBA and endorses that friend will be with patient with home entry and exit. Pt is now cleared for D/C from PT services and remains appropriate for outpatient PT services upon D/C.      Current Level of Function Impacting Discharge (mobility/balance): Supervision [de-identified]' with RW           PLAN :  Patient continues to benefit from skilled intervention to address the above impairments. Continue treatment per established plan of care. to address goals. Recommendation for discharge: (in order for the patient to meet his/her long term goals)  Outpatient physical therapy follow up recommended for TKR rehab    This discharge recommendation:  Has been made in collaboration with the attending provider and/or case management    IF patient discharges home will need the following DME: none, pt has RW        SUBJECTIVE:   Patient stated i'm ready to get home.     OBJECTIVE DATA SUMMARY:   Critical Behavior:  Neurologic State: Alert  Orientation Level: Oriented X4  Cognition: Follows commands     Functional Mobility Training:  Bed Mobility:  Rolling: Independent  Supine to Sit: Independent  Sit to Supine: Independent  Scooting: Independent        Transfers:  Sit to Stand: Modified independent  Stand to Sit: Modified independent  Stand Pivot Transfers: Modified independent                 Balance:  Sitting: Intact  Standing: Impaired  Standing - Static: Fair  Standing - Dynamic : Fair  Ambulation/Gait Training:  Distance (ft): 80 Feet (ft)  Assistive Device: Gait belt;Walker, rolling  Ambulation - Level of Assistance: Supervision          Stairs:  Number of Stairs Trained: 4  Stairs - Level of Assistance: Stand-by assistance   Rail Use: Both    Pain Rating:  3/10, no changes with mobility. Pre-medication coordinated with nursing    Activity Tolerance:   Good and requires rest breaks    After treatment patient left in no apparent distress:   Supine in bed, Call bell within reach, Bed / chair alarm activated, and Side rails x 3    COMMUNICATION/COLLABORATION:   The patients plan of care was discussed with: Registered nurse.      Thania Colmenares, PT, DPT   Time Calculation: 28 mins

## 2022-06-08 ENCOUNTER — PATIENT OUTREACH (OUTPATIENT)
Dept: CASE MANAGEMENT | Age: 72
End: 2022-06-08

## 2022-06-08 NOTE — PROGRESS NOTES
DAILY NOTE     ASSESSMENT / PLAN :   1. Disposition : Home discharge today. 2. Comments : Stable and doing well, home today. POD  2 Days Post-Op s/p Procedure(s):  LEFT TOTAL KNEE ARTHROPLASTY MAKOPLASTY (FAST TRACK)     SUBJECTIVE :     Concerns : None - doing well. OBJECTIVE :     Vitals:    06/07/22 0338 06/07/22 0804 06/07/22 0900 06/07/22 1208   BP: 106/61 118/76 121/70 115/68   Pulse: 85 81 82 75   Resp: 16 18  18   Temp: 97.5 °F (36.4 °C) 98.4 °F (36.9 °C)  98 °F (36.7 °C)   SpO2: 96% 95% 96% 96%   Weight:       Height:           Alert and oriented x3. Dressing C/D/I  Sensation is intact to light touch. No calf pain. ANTICOAGULANTS / LABS :       Key Anti-Platelet Anticoagulant Meds             apixaban (ELIQUIS) 2.5 mg tablet (Taking) Take 1 Tablet by mouth two (2) times a day.           Labs:  Recent Labs     06/07/22  0408   HGB 10.4*      K 3.7   *   CO2 26   BUN 20   CREA 1.21*   *        Patient mobility  Gait  Ambulation - Level of Assistance: Supervision  Distance (ft): 80 Feet (ft)  Assistive Device: Gait belt,Walker, rolling  Rail Use: Both  Stairs - Level of Assistance: Stand-by assistance  Number of Stairs Trained: 1 Technology MD Anthony  Cell [phone]  Tangela Le PA-C  Cell [phone]  Medical Assistants: Adriana1 Melanie Syed [phone]                 Surgery Scheduler: ESTELLA Carroll [phone] ext [phone]

## 2022-06-09 ENCOUNTER — TELEPHONE (OUTPATIENT)
Dept: MEDSURG UNIT | Age: 72
End: 2022-06-09

## 2022-06-09 NOTE — PROGRESS NOTES
Care Transitions Initial Call    Call within 2 business days of discharge: Yes     Patient: Yesenia Lagunas Patient : 1950 MRN: [id number]    Last Discharge [address]       Complaint Diagnosis Description Type Department Provider    22  Primary osteoarthritis of both knees Admission (Discharged) [id number] Pelon Novak MD          Was this an external facility discharge? No     Challenges to be reviewed by the provider   Additional needs identified to be addressed with provider: no       Method of communication with provider : none    Discussed COVID-19 related testing which was not done at this time. Advance Care Planning:   Does patient have an Advance Directive: yes, reviewed and needs to be updated. signature page is not scanned    Inpatient Readmission Risk score: No data recorded  Was this a readmission? no   Patient stated reason for the admission: left knee surgery    Patients top risk factors for readmission: medical condition-uncontrolled pain and medication management   Interventions to address risk factors: Scheduled appointment with [address] [place], [place] [address]K[place] and Obtained and reviewed discharge summary and/or continuity of care documents    Care Transition Nurse (CTN) contacted the patient by telephone to perform post hospital discharge assessment. Verified name and  with patient as identifiers. Provided introduction to self, and explanation of the CTN role. CTN reviewed discharge instructions, medical action plan and red flags with patient who verbalized understanding. Were discharge instructions available to patient? yes. Reviewed appropriate site of care based on symptoms and resources available to patient including: PCP, Specialist and Condition related references. Patient given an opportunity to ask questions and does not have any further questions or concerns at this time. The patient agrees to contact the PCP office for questions related to their healthcare.      Medication reconciliation was performed with patient, who verbalizes understanding of administration of home medications. Advised obtaining a 90-day supply of all daily and as-needed medications. Referral to Pharm D needed: no     Outpatient orders at discharge: PT  Date of initial visit: 6/13/22    Durable Medical Equipment ordered at discharge: None    Was patient discharged with a pulse oximeter? no    Discussed follow-up appointments. If no appointment was previously scheduled, appointment scheduling offered: na. Is follow up appointment scheduled within 7 days of discharge? no.   [facility] follow up appointment(s): No future appointments. Non-[facility] follow up appointment(s): ortho 6/21/22    Plan for follow-up call in 5-7 days based on severity of symptoms and risk factors. Plan for next call: symptom management-pain and follow up appointment-6/13/22  CTN provided contact information for future needs. Goals Addressed                 This Visit's Progress     Prevent complications post hospitalization. 6/9/22   Activity- activity intolerance/energy conservation/frequent rest, exercise to increase mobility and prevent falls. -OP PT Jignesh 6/13/22   Medication compliance   Attend SHIV appt-ortho 6/21/22   Patient will monitor/report red flags- increased SOB, chest pain, increased fatigue.  MINESH

## 2022-06-09 NOTE — TELEPHONE ENCOUNTER
Post Discharge Phone placed to patient after Joint Replacement surgery   Spoke with patient   Pain medications patient is taking oxycodone, tylenol  States pain is tolerable and pain medication is effective.    Patient was able to fill prescriptions, no medication questions    Discharge medications reviewed  States discharge instructions were clear and easy to understand has no questions  Patient is using a walker without difficulty  Walking hourly while awake  Able to do exercises regularly  Bruising is minimal  Swelling is moderate  Patient is elevating leg when resting  Using ice with good relief  States dressing is intact without drainage  Removal education provided  Shower instructions reviewed  Denies N/V, appetite is fair  Constipation is mild, +flatus, taking stool softeners  No difficulty urinating  Follow up appointment is scheduled with surgeon   PT is scheduled Monday  Denies fever, cough  Denies chest pain, back pain, difficulty taking a deep breath or SOB, using IS  Denies any unusual symptoms  Discussed calling surgeon or PCP for concerns  Reminded patient to fill out survey  Opportunity given for patient to ask questions  Call duration 5:05 minutes

## 2022-06-16 ENCOUNTER — PATIENT OUTREACH (OUTPATIENT)
Dept: CASE MANAGEMENT | Age: 72
End: 2022-06-16

## 2022-06-16 NOTE — PROGRESS NOTES
Care Transitions Follow Up Call    Care Transition Nurse (CTN) contacted the patient by telephone to follow up after admission on 22. Verified name and  with patient as identifiers. Addressed changes since last contact: Follow up appointment completed? no.   Was follow up appointment scheduled within 7 days of discharge? no.       [address] follow up appointment(s): No future appointments. Non-[facility] follow up appointment(s): ortho 22    CTN provided contact information for future needs. Plan for follow-up call in 7-10 days based on severity of symptoms and risk factors. Plan for next call: symptom management-pain, edema and follow up appointment-ortho     Goals Addressed                 This Visit's Progress     Prevent complications post hospitalization. 22   Activity- activity intolerance/energy conservation/frequent rest, exercise to increase mobility and prevent falls. -OP PT [facility] 22   Medication compliance   Attend SHIV appt-ortho 22   Patient will monitor/report red flags- increased SOB, chest pain, increased fatigue. MINESH  22 reports pain controlled with current pain plan, attending OP PT as scheduled and improving each sessio, request refill on pain medication. Using teach back explained provider will need to refill but he may decline since he will want her to start reducing its use. Patient given an opportunity to ask questions, repeated information, and verbalized understanding. MINESH

## 2022-06-28 ENCOUNTER — PATIENT OUTREACH (OUTPATIENT)
Dept: CASE MANAGEMENT | Age: 72
End: 2022-06-28

## 2022-06-28 NOTE — PROGRESS NOTES
Care Transitions Follow Up Call     Care Transition Nurse (CTN) contacted the patient by telephone to follow up after admission on 22. Verified name and  with patient as identifiers.     Addressed changes since last contact: Follow up appointment completed? no.   Was follow up appointment scheduled within 7 days of discharge? no.         [facility] follow up appointment(s): No future appointments. Non-[facility] follow up appointment(s): ortho 22     CTN provided contact information for future needs. Plan for follow-up call in 7-10 days based on severity of symptoms and risk factors. Plan for next call: resolve episode  Goals Addressed                 This Visit's Progress     Prevent complications post hospitalization. 22   Activity- activity intolerance/energy conservation/frequent rest, exercise to increase mobility and prevent falls. -OP PT Jigensh 22   Medication compliance   Attend SHIV appt-ortho 22   Patient will monitor/report red flags- increased SOB, chest pain, increased fatigue. [facility]  22 reports pain controlled with current pain plan, attending OP PT as scheduled and improving each sessio, request refill on pain medication. Using teach back explained provider will need to refill but he may decline since he will want her to start reducing its use. Patient given an opportunity to ask questions, repeated information, and verbalized understanding. [facility] [place]  22 reports attendance of ortho appt, continues with OP PT and pain control. Denies red flags at this time. Will continue to monitor red flags and report PT at next week outreach.  [facility] [place]

## 2022-07-20 ENCOUNTER — PATIENT OUTREACH (OUTPATIENT)
Dept: CASE MANAGEMENT | Age: 72
End: 2022-07-20

## 2022-07-20 NOTE — PROGRESS NOTES
Patient has graduated from the Transitions of Care Coordination  program on 7/20/22. Patient/family has the ability to self-manage at this time Care management goals have been completed. Patient was not referred to the [facility] team for further management. Goals Addressed                   This Visit's Progress     COMPLETED: Prevent complications post hospitalization. 6/9/22  Activity- activity intolerance/energy conservation/frequent rest, exercise to increase mobility and prevent falls. -OP PT [facility] 6/13/22  Medication compliance  Attend SHIV appt-ortho 6/21/22  Patient will monitor/report red flags- increased SOB, chest pain, increased fatigue. [facility]  6/16/22 reports pain controlled with current pain plan, attending OP PT as scheduled and improving each sessio, request refill on pain medication. Using teach back explained provider will need to refill but he may decline since he will want her to start reducing its use. Patient given an opportunity to ask questions, repeated information, and verbalized understanding. [facility]  6/28/22 reports attendance of ortho appt, continues with OP PT and pain control. Denies red flags at this time. Will continue to monitor red flags and report PT at next week outreach. [facility]  7/20/22 Per review of chart patient attended follow up appointments. Patient has had no ED or hospital admissions 30 days post discharge. Goal complete. [facility]                 Patient has Care Transition Nurse's contact information for any further questions, concerns, or needs. Patients upcoming visits:  No future appointments.

## 2022-10-10 ENCOUNTER — TELEPHONE (OUTPATIENT)
Dept: FAMILY MEDICINE CLINIC | Age: 72
End: 2022-10-10

## 2022-10-10 NOTE — TELEPHONE ENCOUNTER
Reach out to inform pt that scheduled visit on 10/11/2022 is for an CPX, pt is not due for CPX, CPX was performed on 3/31/2022. LVM and call back number for pt to return call to discuss.

## 2022-10-11 ENCOUNTER — OFFICE VISIT (OUTPATIENT)
Dept: FAMILY MEDICINE CLINIC | Age: 72
End: 2022-10-11
Payer: MEDICARE

## 2022-10-11 VITALS
WEIGHT: 208.6 LBS | OXYGEN SATURATION: 99 % | SYSTOLIC BLOOD PRESSURE: 130 MMHG | HEART RATE: 74 BPM | DIASTOLIC BLOOD PRESSURE: 84 MMHG | HEIGHT: 67 IN | BODY MASS INDEX: 32.74 KG/M2 | TEMPERATURE: 97.6 F | RESPIRATION RATE: 20 BRPM

## 2022-10-11 DIAGNOSIS — N18.31 TYPE 2 DIABETES MELLITUS WITH STAGE 3A CHRONIC KIDNEY DISEASE, WITHOUT LONG-TERM CURRENT USE OF INSULIN (HCC): Primary | ICD-10-CM

## 2022-10-11 DIAGNOSIS — E11.22 TYPE 2 DIABETES MELLITUS WITH STAGE 3A CHRONIC KIDNEY DISEASE, WITHOUT LONG-TERM CURRENT USE OF INSULIN (HCC): Primary | ICD-10-CM

## 2022-10-11 DIAGNOSIS — I10 ESSENTIAL HYPERTENSION: ICD-10-CM

## 2022-10-11 DIAGNOSIS — E78.00 HYPERCHOLESTEREMIA: ICD-10-CM

## 2022-10-11 PROCEDURE — 99214 OFFICE O/P EST MOD 30 MIN: CPT | Performed by: STUDENT IN AN ORGANIZED HEALTH CARE EDUCATION/TRAINING PROGRAM

## 2022-10-11 PROCEDURE — 3044F HG A1C LEVEL LT 7.0%: CPT | Performed by: STUDENT IN AN ORGANIZED HEALTH CARE EDUCATION/TRAINING PROGRAM

## 2022-10-11 PROCEDURE — 1123F ACP DISCUSS/DSCN MKR DOCD: CPT | Performed by: STUDENT IN AN ORGANIZED HEALTH CARE EDUCATION/TRAINING PROGRAM

## 2022-10-11 RX ORDER — GABAPENTIN 600 MG/1
600 TABLET ORAL 3 TIMES DAILY
COMMUNITY

## 2022-10-11 RX ORDER — TRAMADOL HYDROCHLORIDE 50 MG/1
50 TABLET ORAL
COMMUNITY

## 2022-10-11 NOTE — PROGRESS NOTES
Assessment/Plan:     Diagnoses and all orders for this visit:    1. Type 2 diabetes mellitus with stage 3a chronic kidney disease, without long-term current use of insulin (HCC)  -     METABOLIC PANEL, BASIC; Future  -     HEMOGLOBIN A1C WITH EAG; Future  -Chronic. Very well controlled. Diet controlled. Last A1c 6.1.  -Continue with diabetic diet.  -Patient advised to schedule routine annual eye exam.  -Check routine lab work today including a repeat A1c.    2. Essential hypertension  -     METABOLIC PANEL, BASIC; Future  -Chronic. Very well controlled. -Continue on current dose of triamterene/HCTZ and lisinopril. 3. Hypercholesteremia  -     LIPID PANEL; Future  -Chronic. Previously intolerant to statins. Continue with dietary modifications. -Repeat a lipid panel today. Discussed expected course/resolution/complications of diagnosis in detail with patient. Medication risks/benefits/costs/interactions/alternatives discussed with patient. Pt expressed understanding with the diagnosis and plan        Subjective: Miko Ma is a 67 y.o. female who presents for had concerns including Diabetes (Follow up). Current Outpatient Medications   Medication Sig Dispense Refill    traMADoL (ULTRAM) 50 mg tablet Take 50 mg by mouth every six (6) hours as needed for Pain.      gabapentin (NEURONTIN) 600 mg tablet Take 600 mg by mouth three (3) times daily. lisinopriL (PRINIVIL, ZESTRIL) 10 mg tablet TAKE 1 TABLET EVERY OTHER DAY 45 Tablet 0    acetaminophen (Acetaminophen Pain Relief) 500 mg tablet Take 2 Tablets by mouth every six (6) hours as needed for Pain.  60 Tablet 1    triamterene-hydroCHLOROthiazide (DYAZIDE) 37.5-25 mg per capsule TAKE 1 CAPSULE EVERY DAY 90 Capsule 0    Accu-Chek Guide Glucose Meter [facility] USE AS DIRECTED 1 Each 0    lancets (Accu-Chek Softclix Lancets) misc Check  Blood sugar daily E11.9 (Patient taking differently: Check  Blood sugar daily E11.9) 100 Each 3 senna-docusate (PERICOLACE) 8.6-50 mg per tablet Take 1 Tab by mouth two (2) times a day. (Patient taking differently: Take 1 Tablet by mouth daily.) 60 Tab 0    VITAMIN D3 2,000 unit cap capsule Take 1 Cap by mouth every morning.          Allergies   Allergen Reactions    Codeine Other (comments)     \"Didn't like the way it made me feel\"    Pcn [Penicillins] Itching     TOLERATED ANCEF GRADED CHALLENGE 3/16/2021 WITH NO ADVERSE REACTIONS    Iodine Unknown (comments)     Pt unknown of reaction stated it was years ago      Lipitor [Atorvastatin] Myalgia     Past Medical History:   Diagnosis Date    Arthritis     Hypertension     Other and unspecified hyperlipidemia     Pre-diabetes     Unspecified vitamin D deficiency      Past Surgical History:   Procedure Laterality Date    HX HIP REPLACEMENT Right 2020    HX HYSTERECTOMY  1983    HX LUMBAR FUSION       Family History   Problem Relation Age of Onset    Cancer Other         breast    No Known Problems Sister     No Known Problems Brother     No Known Problems Sister     No Known Problems Brother     Anesth Problems Neg Hx      Social History     Socioeconomic History    Marital status:      Spouse name: Not on file    Number of children: Not on file    Years of education: Not on file    Highest education level: Not on file   Occupational History    Not on file   Tobacco Use    Smoking status: Former     Types: Cigarettes     Quit date: 2015     Years since quittin.7    Smokeless tobacco: Never   Vaping Use    Vaping Use: Never used   Substance and Sexual Activity    Alcohol use: Not Currently     Comment: socially, every 2 months    Drug use: No    Sexual activity: Never   Other Topics Concern    Not on file   Social History Narrative    Not on file     Social Determinants of Health     Financial Resource Strain: Not on file   Food Insecurity: Not on file   Transportation Needs: Not on file   Physical Activity: Not on file   Stress: Not on file   Social Connections: Not on file   Intimate Partner Violence: Not on file   Housing Stability: Not on file       Patient is a 58-year-old female who presents the office today for routine follow-up of hypertension and diabetes. Patient has a history of hypertension and is currently on triamterene/HCTZ along with lisinopril. She states her home blood pressures are very well controlled usually in the 177R systolic and 26I diastolic. She denies any chest pain, lightheadedness, dizziness or shortness of breath. Also of note she has a history of hypercholesterolemia however this has been more diet controlled given she has been intolerant to statins in the past.  Patient believes she has had the pneumonia vaccine and declines today. She also declines a flu vaccine at this time. Diabetes Follow-up:   ---------------------------------------  Home glucose Readings - Fasting glucose usually   Hypoglycemia - No  Current Medications - None, diet controlled   Yearly eye exam - Advised to schedule   Microalbumin/Cr - UTD - 4/2022 and normal  Statin Therapy - None - she previously did not tolerate statin   ACEi/ARB - Lisinopril 10 mg daily   Complications - CKD 3a        ROS:   Review of Systems   Constitutional:  Negative for chills and fever. HENT:  Negative for congestion. Respiratory:  Negative for cough and shortness of breath. Cardiovascular:  Negative for chest pain and leg swelling. Gastrointestinal:  Negative for abdominal pain, nausea and vomiting. Neurological:  Negative for dizziness, tingling, weakness and headaches. Objective:   Visit Vitals  /84 (BP 1 Location: Left upper arm, BP Patient Position: Sitting, BP Cuff Size: Adult)   Pulse 74   Temp 97.6 °F (36.4 °C) (Temporal)   Resp 20   Ht 5' 7\" (1.702 m)   Wt 208 lb 9.6 oz (94.6 kg)   SpO2 99%   BMI 32.67 kg/m²         Vitals and Nurse Documentation reviewed.      Physical Exam  Constitutional:       General: She is not in acute distress. Appearance: Normal appearance. She is obese. She is not toxic-appearing. HENT:      Head: Normocephalic and atraumatic. Eyes:      Conjunctiva/sclera: Conjunctivae normal.   Cardiovascular:      Rate and Rhythm: Normal rate and regular rhythm. Pulses: Normal pulses. Heart sounds: Normal heart sounds. No murmur heard. No gallop. Pulmonary:      Effort: Pulmonary effort is normal. No respiratory distress. Breath sounds: Normal breath sounds. No wheezing or rhonchi. Abdominal:      General: Bowel sounds are normal. There is no distension. Palpations: Abdomen is soft. Tenderness: There is no abdominal tenderness. There is no guarding. Musculoskeletal:      Cervical back: Neck supple. Right lower leg: No edema. Left lower leg: No edema. Neurological:      Mental Status: She is alert and oriented to person, place, and time.       Gait: Gait normal.

## 2022-10-11 NOTE — PROGRESS NOTES
Patient stated name &   Chief Complaint   Patient presents with    Diabetes     Follow up        Health Maintenance Due   Topic    Pneumococcal 65+ years (1 - PCV)    Colorectal Cancer Screening Combo     COVID-19 Vaccine (4 - Booster for Pfizer series)    Eye Exam Retinal or Dilated     Flu Vaccine (1)    Depression Screen        Wt Readings from Last 3 Encounters:   10/11/22 208 lb 9.6 oz (94.6 kg)   22 205 lb 11 oz (93.3 kg)   22 211 lb (95.7 kg)     Temp Readings from Last 3 Encounters:   10/11/22 97.6 °F (36.4 °C) (Temporal)   22 98 °F (36.7 °C)   22 97.5 °F (36.4 °C)     BP Readings from Last 3 Encounters:   10/11/22 130/84   22 115/68   22 119/63     Pulse Readings from Last 3 Encounters:   10/11/22 74   22 75   22 70         Learning Assessment:  :     Learning Assessment 3/31/2021 2021 3/13/2018 2015   PRIMARY LEARNER Patient Patient Patient Patient   HIGHEST LEVEL OF EDUCATION - PRIMARY LEARNER  GRADUATED HIGH SCHOOL OR GED - - GRADUATED HIGH SCHOOL OR GED   BARRIERS PRIMARY LEARNER NONE - - NONE   CO-LEARNER CAREGIVER - No No No   PRIMARY LANGUAGE ENGLISH ENGLISH ENGLISH ENGLISH    NEED - - - No   LEARNER PREFERENCE PRIMARY DEMONSTRATION DEMONSTRATION DEMONSTRATION LISTENING   LEARNING SPECIAL TOPICS - - - no   ANSWERED BY patient patient patient patient   RELATIONSHIP SELF SELF SELF SELF       Depression Screening:  :     3 most recent PHQ Screens 10/11/2021   Little interest or pleasure in doing things Not at all   Feeling down, depressed, irritable, or hopeless Not at all   Total Score PHQ 2 0       Fall Risk Assessment:  :     Fall Risk Assessment, last 12 mths 10/11/2021   Able to walk? Yes   Fall in past 12 months? 0   Do you feel unsteady? 0   Are you worried about falling 0       Abuse Screening:  :     Abuse Screening Questionnaire 10/11/2021 2021 3/31/2021 2016 2016   Do you ever feel afraid of your partner? N N N N N   Are you in a relationship with someone who physically or mentally threatens you? N N N N N   Is it safe for you to go home? Y Y Y Y Y       Coordination of Care Questionnaire:  :     1) Have you been to an emergency room, urgent care clinic since your last visit? no   Hospitalized since your last visit? no             2) Have you seen or consulted any other health care providers outside of [address] [place] [address] since your last visit? no  (Include any pap smears or colon screenings in this section.)    3) Do you have an Advance Directive on file? no  Are you interested in receiving information about Advance Directives? no    Patient is accompanied by self I have received verbal consent from Rebecca Luna to discuss any/all medical information while they are present in the room.

## 2022-10-12 LAB
ANION GAP SERPL CALC-SCNC: 4 MMOL/L (ref 5–15)
BUN SERPL-MCNC: 18 MG/DL (ref 6–20)
BUN/CREAT SERPL: 18 (ref 12–20)
CALCIUM SERPL-MCNC: 9.5 MG/DL (ref 8.5–10.1)
CHLORIDE SERPL-SCNC: 109 MMOL/L (ref 97–108)
CHOLEST SERPL-MCNC: 209 MG/DL
CO2 SERPL-SCNC: 27 MMOL/L (ref 21–32)
CREAT SERPL-MCNC: 1.01 MG/DL (ref 0.55–1.02)
EST. AVERAGE GLUCOSE BLD GHB EST-MCNC: 131 MG/DL
GLUCOSE SERPL-MCNC: 89 MG/DL (ref 65–100)
HBA1C MFR BLD: 6.2 % (ref 4–5.6)
HDLC SERPL-MCNC: 57 MG/DL
HDLC SERPL: 3.7 {RATIO} (ref 0–5)
LDLC SERPL CALC-MCNC: 137.4 MG/DL (ref 0–100)
POTASSIUM SERPL-SCNC: 4.3 MMOL/L (ref 3.5–5.1)
SODIUM SERPL-SCNC: 140 MMOL/L (ref 136–145)
TRIGL SERPL-MCNC: 73 MG/DL (ref ?–150)
VLDLC SERPL CALC-MCNC: 14.6 MG/DL

## 2022-10-25 DIAGNOSIS — E11.9 CONTROLLED TYPE 2 DIABETES MELLITUS WITHOUT COMPLICATION, WITHOUT LONG-TERM CURRENT USE OF INSULIN (HCC): Primary | ICD-10-CM

## 2022-10-25 RX ORDER — ISOPROPYL ALCOHOL 70 ML/100ML
SWAB TOPICAL
Qty: 100 PAD | Refills: 3 | Status: SHIPPED | OUTPATIENT
Start: 2022-10-25

## 2022-10-25 RX ORDER — BLOOD-GLUCOSE METER
EACH MISCELLANEOUS
Qty: 1 EACH | Refills: 1 | Status: SHIPPED | OUTPATIENT
Start: 2022-10-25

## 2022-12-21 NOTE — TELEPHONE ENCOUNTER
----- Message from Fidel Ulloa sent at 10/13/2021  1:26 PM EDT -----  Regarding: /Telephone  Contact: [phone]  Patient return call    Caller's first and last name and relationship (if not the patient): N/A      Best contact number(s):[phone]      Whose call is being returned: \"Humberto\"      Details to clarify the request: Patient stated \"its in regards to her medicine. \"      Fidel Ulloa (V5) oriented

## 2023-02-13 ENCOUNTER — VIRTUAL VISIT (OUTPATIENT)
Dept: FAMILY MEDICINE CLINIC | Age: 73
End: 2023-02-13
Payer: MEDICARE

## 2023-02-13 DIAGNOSIS — E11.21 CONTROLLED TYPE 2 DIABETES MELLITUS WITH DIABETIC NEPHROPATHY, WITHOUT LONG-TERM CURRENT USE OF INSULIN (HCC): Primary | ICD-10-CM

## 2023-02-13 PROBLEM — N18.30 CHRONIC RENAL DISEASE, STAGE III (HCC): Status: ACTIVE | Noted: 2023-02-13

## 2023-02-13 PROCEDURE — 1090F PRES/ABSN URINE INCON ASSESS: CPT | Performed by: FAMILY MEDICINE

## 2023-02-13 PROCEDURE — 2022F DILAT RTA XM EVC RTNOPTHY: CPT | Performed by: FAMILY MEDICINE

## 2023-02-13 PROCEDURE — G8432 DEP SCR NOT DOC, RNG: HCPCS | Performed by: FAMILY MEDICINE

## 2023-02-13 PROCEDURE — G9899 SCRN MAM PERF RSLTS DOC: HCPCS | Performed by: FAMILY MEDICINE

## 2023-02-13 PROCEDURE — G8417 CALC BMI ABV UP PARAM F/U: HCPCS | Performed by: FAMILY MEDICINE

## 2023-02-13 PROCEDURE — 99212 OFFICE O/P EST SF 10 MIN: CPT | Performed by: FAMILY MEDICINE

## 2023-02-13 PROCEDURE — 1123F ACP DISCUSS/DSCN MKR DOCD: CPT | Performed by: FAMILY MEDICINE

## 2023-02-13 PROCEDURE — 3046F HEMOGLOBIN A1C LEVEL >9.0%: CPT | Performed by: FAMILY MEDICINE

## 2023-02-13 PROCEDURE — G8399 PT W/DXA RESULTS DOCUMENT: HCPCS | Performed by: FAMILY MEDICINE

## 2023-02-13 PROCEDURE — 1101F PT FALLS ASSESS-DOCD LE1/YR: CPT | Performed by: FAMILY MEDICINE

## 2023-02-13 PROCEDURE — 3017F COLORECTAL CA SCREEN DOC REV: CPT | Performed by: FAMILY MEDICINE

## 2023-02-13 PROCEDURE — G8536 NO DOC ELDER MAL SCRN: HCPCS | Performed by: FAMILY MEDICINE

## 2023-02-13 PROCEDURE — G8427 DOCREV CUR MEDS BY ELIG CLIN: HCPCS | Performed by: FAMILY MEDICINE

## 2023-02-13 RX ORDER — BLOOD SUGAR DIAGNOSTIC
STRIP MISCELLANEOUS
Qty: 100 STRIP | Refills: 3 | Status: SHIPPED | OUTPATIENT
Start: 2023-02-13

## 2023-02-13 NOTE — PROGRESS NOTES
1. Have you been to the ER, urgent care clinic since your last visit? Hospitalized since your last visit? No    2. Have you seen or consulted any other health care providers outside of the [address] [place] [address] since your last visit? Include any pap smears or colon screening. No    Chief Complaint   Patient presents with    Medication Refill     Test strip     3 most recent PHQ Screens 2/13/2023   Little interest or pleasure in doing things Not at all   Feeling down, depressed, irritable, or hopeless Not at all   Total Score PHQ 2 0     Abuse Screening Questionnaire 2/13/2023   Do you ever feel afraid of your partner? N   Are you in a relationship with someone who physically or mentally threatens you? N   Is it safe for you to go home? Y     Fall Risk Assessment, last 12 mths 2/13/2023   Able to walk? Yes   Fall in past 12 months? 0   Do you feel unsteady?  0   Are you worried about falling 0     Learning Assessment 3/31/2021   PRIMARY LEARNER Patient   HIGHEST LEVEL OF EDUCATION - PRIMARY LEARNER  GRADUATED HIGH SCHOOL OR GED   BARRIERS PRIMARY LEARNER NONE   CO-LEARNER CAREGIVER -   PRIMARY LANGUAGE ENGLISH    NEED -   LEARNER PREFERENCE PRIMARY DEMONSTRATION   LEARNING SPECIAL TOPICS -   ANSWERED BY patient   RELATIONSHIP SELF     Patient-Reported Vitals 2/13/2023   Patient-Reported Weight 200lb

## 2023-02-13 NOTE — PROGRESS NOTES
Kristin Cruz is a 67 y.o. female who was seen by synchronous (real-time) audio-video technology on 2/13/2023 for Medication Refill (Test strip)        Assessment & Plan:   Diagnoses and all orders for this visit:    1. Controlled type 2 diabetes mellitus with diabetic nephropathy, without long-term current use of insulin ([facility])  -     glucose blood VI test strips (Accu-Chek Guide test strips) strip; Specifically accu-chek guide test strips to match her meter. Test blood sugar daily          Subjective:       Prior to Admission medications    Medication Sig Start Date End Date Taking? Authorizing Provider   glucose blood VI test strips (Accu-Chek Guide test strips) strip Specifically accu-chek guide test strips to match her meter. Test blood sugar daily 2/13/23  Yes Vicki Perkins MD   alcohol swabs (BD Single Use Swabs Regular) padm Use for glucose testing once a day 10/25/22  Yes Vicki Perkins MD   Blood Glucose Control, Low (True Metrix Level 1) soln Use as directed to test glucometer 10/25/22  Yes Vicki Perkins MD   triamterene-hydroCHLOROthiazide (DYAZIDE) 37.5-25 mg per capsule TAKE 1 CAPSULE EVERY DAY 10/19/22  Yes Vicki Perkins MD   traMADoL (ULTRAM) 50 mg tablet Take 50 mg by mouth every six (6) hours as needed for Pain. Yes Provider, Historical   gabapentin (NEURONTIN) 600 mg tablet Take 600 mg by mouth three (3) times daily. Yes Provider, Historical   lisinopriL (PRINIVIL, ZESTRIL) 10 mg tablet TAKE 1 TABLET EVERY OTHER DAY 10/5/22  Yes Vicki Perkins MD   acetaminophen (Acetaminophen Pain Relief) 500 mg tablet Take 2 Tablets by mouth every six (6) hours as needed for Pain.  6/6/22  Yes Omayra Chávez MD   Accu-Chek Guide Glucose Meter misc USE AS DIRECTED 9/21/21  Yes Vicki Perkins MD   lancets (Accu-Chek Softclix Lancets) misc Check  Blood sugar daily E11.9  Patient taking differently: Check  Blood sugar daily E11.9 4/15/21  Yes Vicki Perkins MD   senna-docusate (PERICOLACE) 8.6-50 mg per tablet Take 1 Tab by mouth two (2) times a day. Patient taking differently: Take 1 Tablet by mouth daily. 3/19/21  Yes Veverpatsy Church NP   VITAMIN D3 2,000 unit cap capsule Take 1 Cap by mouth every morning. 4/27/15  Yes Provider, Historical   Accu-Chek Beatriz Plus test strp strip CHECK BLOOD SUGAR DAILY 1/15/23 2/13/23  Rell Esquivel NP     Patient Active Problem List    Diagnosis Date Noted    Chronic renal disease, stage III 02/13/2023    Type 2 diabetes mellitus with chronic kidney disease ([address] 10/11/2022    Primary osteoarthritis of left knee 06/06/2022    Lumbar spinal stenosis 03/17/2021    Lumbar stenosis with neurogenic claudication 03/16/2021    Type 2 diabetes with nephropathy ([address] 10/31/2018    Severe obesity (BMI 35.0-39. 9) with comorbidity ([address] 05/08/2018    Fibromyalgia 03/13/2018    Primary osteoarthritis of both knees 03/13/2018    Obesity (BMI 30-39.9) 03/13/2018    Chronic back pain greater than 3 months duration 03/13/2018    Type 2 diabetes mellitus with diabetic neuropathy ([address] 01/16/2018    Controlled type 2 diabetes mellitus without complication, without long-term current use of insulin ([address] 11/14/2016    Diabetes mellitus type 2, controlled ([address] 03/30/2016    Arthritis 05/27/2015    Hypercholesteremia 05/27/2015    Essential hypertension 05/27/2015    Insomnia 05/27/2015    Hx of diabetes mellitus 05/27/2015     Current Outpatient Medications   Medication Sig Dispense Refill    glucose blood VI test strips (Accu-Chek Guide test strips) strip Specifically accu-chek guide test strips to match her meter.  Test blood sugar daily 100 Strip 3    alcohol swabs (BD Single Use Swabs Regular) padm Use for glucose testing once a day 100 Pad 3    Blood Glucose Control, Low (True Metrix Level 1) soln Use as directed to test glucometer 1 Each 1    triamterene-hydroCHLOROthiazide (DYAZIDE) 37.5-25 mg per capsule TAKE 1 CAPSULE EVERY DAY 90 Capsule 0    traMADoL (ULTRAM) 50 mg tablet Take 50 mg by mouth every six (6) hours as needed for Pain.      gabapentin (NEURONTIN) 600 mg tablet Take 600 mg by mouth three (3) times daily. lisinopriL (PRINIVIL, ZESTRIL) 10 mg tablet TAKE 1 TABLET EVERY OTHER DAY 45 Tablet 0    acetaminophen (Acetaminophen Pain Relief) 500 mg tablet Take 2 Tablets by mouth every six (6) hours as needed for Pain. 60 Tablet 1    Accu-Chek Guide Glucose Meter misc USE AS DIRECTED 1 Each 0    lancets (Accu-Chek Softclix Lancets) misc Check  Blood sugar daily E11.9 (Patient taking differently: Check  Blood sugar daily E11.9) 100 Each 3    senna-docusate (PERICOLACE) 8.6-50 mg per tablet Take 1 Tab by mouth two (2) times a day. (Patient taking differently: Take 1 Tablet by mouth daily.) 60 Tab 0    VITAMIN D3 2,000 unit cap capsule Take 1 Cap by mouth every morning.          ROS    Objective:     Patient-Reported Vitals 2/13/2023   Patient-Reported Weight 200lb        [INSTRUCTIONS:  \"[x]\" Indicates a positive item  \"[]\" Indicates a negative item  -- DELETE ALL ITEMS NOT EXAMINED]    Constitutional: [x] Appears well-developed and well-nourished [x] No apparent distress      [] Abnormal -     Mental status: [x] Alert and awake  [x] Oriented to person/place/time [x] Able to follow commands    [] Abnormal -     Eyes:   EOM    [x]  Normal    [] Abnormal -   Sclera  [x]  Normal    [] Abnormal -          Discharge [x]  None visible   [] Abnormal -     HENT: [x] Normocephalic, atraumatic  [] Abnormal -   [x] Mouth/Throat: Mucous membranes are moist    External Ears [x] Normal  [] Abnormal -    Neck: [x] No visualized mass [] Abnormal -     Pulmonary/Chest: [x] Respiratory effort normal   [x] No visualized signs of difficulty breathing or respiratory distress        [] Abnormal -      Musculoskeletal:   [x] Normal gait with no signs of ataxia         [x] Normal range of motion of neck        [] Abnormal -     Neurological:        [x] No Facial Asymmetry (Cranial nerve 7 motor function) (limited exam due to video visit)          [x] No gaze palsy        [] Abnormal -          Skin:        [x] No significant exanthematous lesions or discoloration noted on facial skin         [] Abnormal -            Psychiatric:       [x] Normal Affect [] Abnormal -        [x] No Hallucinations    Other pertinent observable physical exam findings:-        We discussed the expected course, resolution and complications of the diagnosis(es) in detail. Medication risks, benefits, costs, interactions, and alternatives were discussed as indicated. I advised her to contact the office if her condition worsens, changes or fails to improve as anticipated. She expressed understanding with the diagnosis(es) and plan. Gemini Loja, was evaluated through a synchronous (real-time) audio-video encounter. The patient (or guardian if applicable) is aware that this is a billable service, which includes applicable co-pays. This Virtual Visit was conducted with patient's (and/or legal guardian's) consent. The visit was conducted pursuant to the emergency declaration under the [address] [place] [address] authority and the [employer] General Act. Patient identification was verified, and a caregiver was present when appropriate.   The patient was located at: Home: [address] [place] [id number]  The provider was located at: Home: Grzegorz Harvey MD

## 2023-04-05 LAB — HBA1C MFR BLD HPLC: 6.3 %

## 2023-05-21 RX ORDER — LISINOPRIL 10 MG/1
TABLET ORAL
COMMUNITY
Start: 2022-10-05

## 2023-05-21 RX ORDER — GABAPENTIN 600 MG/1
600 TABLET ORAL 3 TIMES DAILY
COMMUNITY

## 2023-05-21 RX ORDER — TRAMADOL HYDROCHLORIDE 50 MG/1
50 TABLET ORAL EVERY 6 HOURS PRN
COMMUNITY

## 2023-05-21 RX ORDER — AMOXICILLIN 250 MG
1 CAPSULE ORAL 2 TIMES DAILY
COMMUNITY
Start: 2021-03-19

## 2023-05-21 RX ORDER — TRIAMTERENE AND HYDROCHLOROTHIAZIDE 37.5; 25 MG/1; MG/1
1 CAPSULE ORAL DAILY
COMMUNITY
Start: 2022-10-19

## 2023-05-21 RX ORDER — ACETAMINOPHEN 500 MG
1000 TABLET ORAL EVERY 6 HOURS PRN
COMMUNITY
Start: 2022-06-06

## 2023-12-02 ENCOUNTER — HOSPITAL ENCOUNTER (OUTPATIENT)
Facility: HOSPITAL | Age: 73
End: 2023-12-02
Payer: MEDICARE

## 2023-12-02 DIAGNOSIS — M17.11 PRIMARY OSTEOARTHRITIS OF RIGHT KNEE: ICD-10-CM

## 2023-12-02 PROCEDURE — 73700 CT LOWER EXTREMITY W/O DYE: CPT

## 2024-01-20 NOTE — H&P
Marcelle Knight was referred for evaluation by:Dr. Alfredito Chapman for Pre- Op Evaluation.  Please see encounter details and orders for consultative summary.    Type of surgery : Right Total Knee Arthoplasty, Makoplasty  Surgery site : Right knee  Anesthesia type: Spinal  Date of procedure:  2/5/2024    This 73 y.o. year old female presents with complaints of right knee pain for several years.  Pain has progressively worsened and is interfering with daily activities. Conservative measures including medication management, activity modification, physical therapy and injection therapy have been exhausted prior to the decision for surgery.  Patient has discussed the risks, alternatives, and benefits of the surgery with surgeon and has elected to proceed with surgical intervention.    PCP: Dr Cookie Gray    Allergies:   Allergies   Allergen Reactions    Penicillins Itching     TOLERATED ANCEF GRADED CHALLENGE 3/16/2021 WITH NO ADVERSE REACTIONS    Atorvastatin Myalgia    Iodine      Other reaction(s): Unknown (comments)  Pt unknown of reaction stated it was years ago     Latex allergy: no  Prior reactions to anesthesia:  None     Current Outpatient Medications   Medication Sig    acetaminophen (TYLENOL) 325 MG tablet Take 2 tablets by mouth every 6 hours as needed for Pain    baclofen (LIORESAL) 10 MG tablet Take 1 tablet by mouth as needed    Acetaminophen-Codeine (TYLENOL WITH CODEINE #3 PO) Take by mouth as needed    docusate sodium (COLACE) 100 MG capsule Take 1 capsule by mouth daily    Cholecalciferol 50 MCG (2000 UT) CAPS Take 1 capsule by mouth daily    gabapentin (NEURONTIN) 600 MG tablet Take 0.5 tablets by mouth as needed.    lisinopril (PRINIVIL;ZESTRIL) 10 MG tablet Take 1 tablet by mouth daily    triamterene-hydroCHLOROthiazide (DYAZIDE) 37.5-25 MG per capsule Take 1 capsule by mouth daily     No current facility-administered medications for this encounter.     Past Medical History:   Diagnosis Date

## 2024-01-22 ENCOUNTER — HOSPITAL ENCOUNTER (OUTPATIENT)
Facility: HOSPITAL | Age: 74
Discharge: HOME | End: 2024-01-25
Payer: MEDICARE

## 2024-01-22 VITALS
DIASTOLIC BLOOD PRESSURE: 82 MMHG | HEART RATE: 78 BPM | BODY MASS INDEX: 39.24 KG/M2 | TEMPERATURE: 97.3 F | HEIGHT: 65 IN | WEIGHT: 235.5 LBS | OXYGEN SATURATION: 96 % | RESPIRATION RATE: 18 BRPM | SYSTOLIC BLOOD PRESSURE: 165 MMHG

## 2024-01-22 PROBLEM — M17.11 PRIMARY OSTEOARTHRITIS OF RIGHT KNEE: Status: ACTIVE | Noted: 2024-01-22

## 2024-01-22 LAB
ALBUMIN SERPL-MCNC: 3.6 G/DL (ref 3.5–5)
ALBUMIN/GLOB SERPL: 0.8 (ref 1.1–2.2)
ALP SERPL-CCNC: 87 U/L (ref 45–117)
ALT SERPL-CCNC: 17 U/L (ref 12–78)
ANION GAP SERPL CALC-SCNC: 3 MMOL/L (ref 5–15)
APPEARANCE UR: CLEAR
AST SERPL-CCNC: 16 U/L (ref 15–37)
BACTERIA URNS QL MICRO: NEGATIVE /HPF
BASOPHILS # BLD: 0 K/UL (ref 0–0.1)
BASOPHILS NFR BLD: 1 % (ref 0–1)
BILIRUB SERPL-MCNC: 0.3 MG/DL (ref 0.2–1)
BILIRUB UR QL: NEGATIVE
BUN SERPL-MCNC: 16 MG/DL (ref 6–20)
BUN/CREAT SERPL: 16 (ref 12–20)
CALCIUM SERPL-MCNC: 9.5 MG/DL (ref 8.5–10.1)
CHLORIDE SERPL-SCNC: 105 MMOL/L (ref 97–108)
CO2 SERPL-SCNC: 31 MMOL/L (ref 21–32)
COLOR UR: ABNORMAL
CREAT SERPL-MCNC: 0.99 MG/DL (ref 0.55–1.02)
CRP SERPL-MCNC: <0.29 MG/DL (ref 0–0.6)
DIFFERENTIAL METHOD BLD: NORMAL
EKG ATRIAL RATE: 66 BPM
EKG DIAGNOSIS: NORMAL
EKG P AXIS: 76 DEGREES
EKG P-R INTERVAL: 152 MS
EKG Q-T INTERVAL: 370 MS
EKG QRS DURATION: 80 MS
EKG QTC CALCULATION (BAZETT): 387 MS
EKG R AXIS: 47 DEGREES
EKG T AXIS: 20 DEGREES
EKG VENTRICULAR RATE: 66 BPM
EOSINOPHIL # BLD: 0.3 K/UL (ref 0–0.4)
EOSINOPHIL NFR BLD: 4 % (ref 0–7)
EPITH CASTS URNS QL MICRO: ABNORMAL /LPF
ERYTHROCYTE [DISTWIDTH] IN BLOOD BY AUTOMATED COUNT: 14.3 % (ref 11.5–14.5)
ERYTHROCYTE [SEDIMENTATION RATE] IN BLOOD: 57 MM/HR (ref 0–30)
EST. AVERAGE GLUCOSE BLD GHB EST-MCNC: 131 MG/DL
GLOBULIN SER CALC-MCNC: 4.7 G/DL (ref 2–4)
GLUCOSE SERPL-MCNC: 103 MG/DL (ref 65–100)
GLUCOSE UR STRIP.AUTO-MCNC: NEGATIVE MG/DL
HBA1C MFR BLD: 6.2 % (ref 4–5.6)
HCT VFR BLD AUTO: 37.9 % (ref 35–47)
HGB BLD-MCNC: 11.7 G/DL (ref 11.5–16)
HGB UR QL STRIP: NEGATIVE
HYALINE CASTS URNS QL MICRO: ABNORMAL /LPF (ref 0–2)
IMM GRANULOCYTES # BLD AUTO: 0 K/UL (ref 0–0.04)
IMM GRANULOCYTES NFR BLD AUTO: 0 % (ref 0–0.5)
KETONES UR QL STRIP.AUTO: NEGATIVE MG/DL
LEUKOCYTE ESTERASE UR QL STRIP.AUTO: NEGATIVE
LYMPHOCYTES # BLD: 3.4 K/UL (ref 0.8–3.5)
LYMPHOCYTES NFR BLD: 41 % (ref 12–49)
MCH RBC QN AUTO: 28.7 PG (ref 26–34)
MCHC RBC AUTO-ENTMCNC: 30.9 G/DL (ref 30–36.5)
MCV RBC AUTO: 93.1 FL (ref 80–99)
MONOCYTES # BLD: 0.6 K/UL (ref 0–1)
MONOCYTES NFR BLD: 8 % (ref 5–13)
NEUTS SEG # BLD: 4 K/UL (ref 1.8–8)
NEUTS SEG NFR BLD: 46 % (ref 32–75)
NITRITE UR QL STRIP.AUTO: NEGATIVE
NRBC # BLD: 0 K/UL (ref 0–0.01)
NRBC BLD-RTO: 0 PER 100 WBC
PH UR STRIP: 5.5 (ref 5–8)
PLATELET # BLD AUTO: 344 K/UL (ref 150–400)
PMV BLD AUTO: 9.7 FL (ref 8.9–12.9)
POTASSIUM SERPL-SCNC: 3.9 MMOL/L (ref 3.5–5.1)
PROT SERPL-MCNC: 8.3 G/DL (ref 6.4–8.2)
PROT UR STRIP-MCNC: NEGATIVE MG/DL
RBC # BLD AUTO: 4.07 M/UL (ref 3.8–5.2)
RBC #/AREA URNS HPF: ABNORMAL /HPF (ref 0–5)
SODIUM SERPL-SCNC: 139 MMOL/L (ref 136–145)
SP GR UR REFRACTOMETRY: 1.02 (ref 1–1.03)
URINE CULTURE IF INDICATED: ABNORMAL
UROBILINOGEN UR QL STRIP.AUTO: 1 EU/DL (ref 0.2–1)
WBC # BLD AUTO: 8.4 K/UL (ref 3.6–11)
WBC URNS QL MICRO: ABNORMAL /HPF (ref 0–4)

## 2024-01-22 PROCEDURE — 85025 COMPLETE CBC W/AUTO DIFF WBC: CPT

## 2024-01-22 PROCEDURE — 80053 COMPREHEN METABOLIC PANEL: CPT

## 2024-01-22 PROCEDURE — 81001 URINALYSIS AUTO W/SCOPE: CPT

## 2024-01-22 PROCEDURE — 83036 HEMOGLOBIN GLYCOSYLATED A1C: CPT

## 2024-01-22 PROCEDURE — APPNB30 APP NON BILLABLE TIME 0-30 MINS: Performed by: NURSE PRACTITIONER

## 2024-01-22 PROCEDURE — 93005 ELECTROCARDIOGRAM TRACING: CPT | Performed by: ORTHOPAEDIC SURGERY

## 2024-01-22 PROCEDURE — 84466 ASSAY OF TRANSFERRIN: CPT

## 2024-01-22 PROCEDURE — 93010 ELECTROCARDIOGRAM REPORT: CPT | Performed by: STUDENT IN AN ORGANIZED HEALTH CARE EDUCATION/TRAINING PROGRAM

## 2024-01-22 PROCEDURE — 86140 C-REACTIVE PROTEIN: CPT

## 2024-01-22 PROCEDURE — 36415 COLL VENOUS BLD VENIPUNCTURE: CPT

## 2024-01-22 PROCEDURE — 85652 RBC SED RATE AUTOMATED: CPT

## 2024-01-22 RX ORDER — BACLOFEN 10 MG/1
10 TABLET ORAL AS NEEDED
COMMUNITY

## 2024-01-22 RX ORDER — ACETAMINOPHEN 325 MG/1
650 TABLET ORAL EVERY 6 HOURS PRN
COMMUNITY

## 2024-01-22 RX ORDER — DOCUSATE SODIUM 100 MG/1
100 CAPSULE, LIQUID FILLED ORAL DAILY
COMMUNITY

## 2024-01-22 ASSESSMENT — ENCOUNTER SYMPTOMS
ABDOMINAL PAIN: 0
SORE THROAT: 0
WHEEZING: 0
BLOOD IN STOOL: 0
TROUBLE SWALLOWING: 0
VOMITING: 0
SHORTNESS OF BREATH: 0
NAUSEA: 0
COUGH: 0

## 2024-01-22 NOTE — PERIOP NOTE
[facility]                   [address] [place] [address]   MAIN OR                                  [phone]   MAIN PRE OP                          [phone]                                                                                AMBULATORY PRE OP          [phone]  PRE-ADMISSION TESTING    [phone]   Surgery Date:  Monday 2/5/2024       Is surgery arrival time given by surgeon?  NO  If “NO”, [facility] staff will call you between 3 and 7pm the day before your surgery with your arrival time. (If your surgery is on a Monday, we will call you the Friday before.)    Call [phone] after 7pm Monday-Friday if you did not receive this call.    INSTRUCTIONS BEFORE YOUR SURGERY   When You  Arrive Arrive at the 2nd Floor Admitting Desk on the day of your surgery  Have your insurance card, photo ID, and any copayment (if needed)   Food   and   Drink NO food or drink after midnight the night before surgery    This means NO water, gum, mints, coffee, juice, etc.  No alcohol (beer, wine, liquor) 24 hours before and after surgery   Medications to   TAKE   Morning of Surgery MEDICATIONS TO TAKE THE MORNING OF SURGERY WITH A SIP OF WATER:     Gabapentin if needed  Tylenol #3 if needed  Baclofen if needed    DO NOT take Lisinopril, Triamterene the morning of surgery.     Tylenol may be taken as needed.   Medications  To  STOP      7 days before surgery Non-Steroidal anti-inflammatory Drugs (NSAID's): for example, Ibuprofen (Advil, Motrin), Naproxen (Aleve)  Aspirin, if taking for pain   Herbal supplements, vitamins, and fish oil  Other:     Blood  Thinners If you take  Aspirin, Plavix, Coumadin, or any blood-thinning or anti-blood clot medicine, talk to the doctor who prescribed the medications for pre-operative instructions.   Bathing Clothing  Jewelry  Valuables     If you shower the morning of surgery, please do not apply anything to your skin (lotions,

## 2024-01-23 LAB
BACTERIA SPEC CULT: NORMAL
BACTERIA SPEC CULT: NORMAL
SERVICE CMNT-IMP: NORMAL
TRANSFERRIN SERPL-MCNC: 277 MG/DL (ref 192–364)

## 2024-01-23 ASSESSMENT — KOOS JR
BENDING TO THE FLOOR TO PICK UP OBJECT: 4
KOOS JR TOTAL INTERVAL SCORE: 0
TWISING OR PIVOTING ON KNEE: 4
HOW SEVERE IS YOUR KNEE STIFFNESS AFTER FIRST WAKING IN MORNING: 4
RISING FROM SITTING: 4
GOING UP OR DOWN STAIRS: 4
STRAIGHTENING KNEE FULLY: 4
STANDING UPRIGHT: 4

## 2024-01-23 ASSESSMENT — PROMIS GLOBAL HEALTH SCALE
IN GENERAL, WOULD YOU SAY YOUR HEALTH IS...[ON A SCALE OF 1 (POOR) TO 5 (EXCELLENT)]: 4
WHO IS THE PERSON COMPLETING THE PROMIS V1.1 SURVEY?: 0
IN THE PAST 7 DAYS, HOW WOULD YOU RATE YOUR PAIN ON AVERAGE [ON A SCALE FROM 0 (NO PAIN) TO 10 (WORST IMAGINABLE PAIN)]?: 9
SUM OF RESPONSES TO QUESTIONS 3, 6, 7, & 8: 21
HOW IS THE PROMIS V1.1 BEING ADMINISTERED?: 0
IN GENERAL, HOW WOULD YOU RATE YOUR SATISFACTION WITH YOUR SOCIAL ACTIVITIES AND RELATIONSHIPS [ON A SCALE OF 1 (POOR) TO 5 (EXCELLENT)]?: 5
TO WHAT EXTENT ARE YOU ABLE TO CARRY OUT YOUR EVERYDAY PHYSICAL ACTIVITIES SUCH AS WALKING, CLIMBING STAIRS, CARRYING GROCERIES, OR MOVING A CHAIR [ON A SCALE OF 1 (NOT AT ALL) TO 5 (COMPLETELY)]?: 4
IN THE PAST 7 DAYS, HOW WOULD YOU RATE YOUR FATIGUE ON AVERAGE [ON A SCALE FROM 1 (NONE) TO 5 (VERY SEVERE)]?: 4
IN GENERAL, HOW WOULD YOU RATE YOUR MENTAL HEALTH, INCLUDING YOUR MOOD AND YOUR ABILITY TO THINK [ON A SCALE OF 1 (POOR) TO 5 (EXCELLENT)]?: 5
IN GENERAL, WOULD YOU SAY YOUR QUALITY OF LIFE IS...[ON A SCALE OF 1 (POOR) TO 5 (EXCELLENT)]: 4
IN GENERAL, HOW WOULD YOU RATE YOUR PHYSICAL HEALTH [ON A SCALE OF 1 (POOR) TO 5 (EXCELLENT)]?: 4
IN THE PAST 7 DAYS, HOW OFTEN HAVE YOU BEEN BOTHERED BY EMOTIONAL PROBLEMS, SUCH AS FEELING ANXIOUS, DEPRESSED, OR IRRITABLE [ON A SCALE FROM 1 (NEVER) TO 5 (ALWAYS)]?: 4
IN GENERAL, PLEASE RATE HOW WELL YOU CARRY OUT YOUR USUAL SOCIAL ACTIVITIES (INCLUDES ACTIVITIES AT HOME, AT WORK, AND IN YOUR COMMUNITY, AND RESPONSIBILITIES AS A PARENT, CHILD, SPOUSE, EMPLOYEE, FRIEND, ETC) [ON A SCALE OF 1 (POOR) TO 5 (EXCELLENT)]?: 5
SUM OF RESPONSES TO QUESTIONS 2, 4, 5, & 10: 18

## 2024-02-02 ENCOUNTER — ANESTHESIA EVENT (OUTPATIENT)
Facility: HOSPITAL | Age: 74
End: 2024-02-02
Payer: MEDICARE

## 2024-02-02 NOTE — PERIOP NOTE
Hello,     You are scheduled to have surgery SUNDAY at [facility].     We would like for you to arrive at  0945 am  We are located on the second floor, [address]. You will check-in at the registration desk located outside the elevators on the second floor prior to proceeding to [address].  Remember nothing to eat or drink after midnight on SUNDAY. If you need to take medications the morning of surgery, please take with a few sips of water.   Wear loose, comfortable clothing and leave all your jewelry at home.   You may bring your cell phone with you.  One family member will be allowed in the pre-op area once you are dressed and your IV has been started.   You will need someone to drive you home and be with you for 24 hours post-anesthesia.     We look forward to seeing you! Call [phone] for questions after hours and [phone] between 5:30AM and 6PM.     Thanks!    [facility] PREOP TEAM

## 2024-02-05 ENCOUNTER — APPOINTMENT (OUTPATIENT)
Facility: HOSPITAL | Age: 74
End: 2024-02-05
Attending: ORTHOPAEDIC SURGERY
Payer: MEDICARE

## 2024-02-05 ENCOUNTER — ANESTHESIA (OUTPATIENT)
Facility: HOSPITAL | Age: 74
End: 2024-02-05
Payer: MEDICARE

## 2024-02-05 ENCOUNTER — HOSPITAL ENCOUNTER (OUTPATIENT)
Facility: HOSPITAL | Age: 74
Setting detail: OBSERVATION
Discharge: HOME | End: 2024-02-05
Attending: ORTHOPAEDIC SURGERY | Admitting: ORTHOPAEDIC SURGERY
Payer: MEDICARE

## 2024-02-05 VITALS
HEIGHT: 65 IN | OXYGEN SATURATION: 94 % | HEART RATE: 98 BPM | BODY MASS INDEX: 38.75 KG/M2 | DIASTOLIC BLOOD PRESSURE: 77 MMHG | WEIGHT: 232.59 LBS | SYSTOLIC BLOOD PRESSURE: 139 MMHG | RESPIRATION RATE: 18 BRPM | TEMPERATURE: 97.7 F

## 2024-02-05 DIAGNOSIS — M17.11 PRIMARY OSTEOARTHRITIS OF RIGHT KNEE: Primary | ICD-10-CM

## 2024-02-05 PROCEDURE — G0378 HOSPITAL OBSERVATION PER HR: HCPCS

## 2024-02-05 PROCEDURE — 6360000002 HC RX W HCPCS: Performed by: ANESTHESIOLOGY

## 2024-02-05 PROCEDURE — 97116 GAIT TRAINING THERAPY: CPT

## 2024-02-05 PROCEDURE — APPNB30 APP NON BILLABLE TIME 0-30 MINS: Performed by: NURSE PRACTITIONER

## 2024-02-05 PROCEDURE — 7100000000 HC PACU RECOVERY - FIRST 15 MIN: Performed by: ORTHOPAEDIC SURGERY

## 2024-02-05 PROCEDURE — 7100000011 HC PHASE II RECOVERY - ADDTL 15 MIN: Performed by: ORTHOPAEDIC SURGERY

## 2024-02-05 PROCEDURE — 3700000000 HC ANESTHESIA ATTENDED CARE: Performed by: ORTHOPAEDIC SURGERY

## 2024-02-05 PROCEDURE — 2580000003 HC RX 258: Performed by: ORTHOPAEDIC SURGERY

## 2024-02-05 PROCEDURE — 97161 PT EVAL LOW COMPLEX 20 MIN: CPT

## 2024-02-05 PROCEDURE — 6370000000 HC RX 637 (ALT 250 FOR IP): Performed by: ORTHOPAEDIC SURGERY

## 2024-02-05 PROCEDURE — 73560 X-RAY EXAM OF KNEE 1 OR 2: CPT

## 2024-02-05 PROCEDURE — 2500000003 HC RX 250 WO HCPCS: Performed by: NURSE ANESTHETIST, CERTIFIED REGISTERED

## 2024-02-05 PROCEDURE — 6360000002 HC RX W HCPCS: Performed by: NURSE ANESTHETIST, CERTIFIED REGISTERED

## 2024-02-05 PROCEDURE — 7100000010 HC PHASE II RECOVERY - FIRST 15 MIN: Performed by: ORTHOPAEDIC SURGERY

## 2024-02-05 PROCEDURE — 2580000003 HC RX 258: Performed by: ANESTHESIOLOGY

## 2024-02-05 PROCEDURE — 3700000001 HC ADD 15 MINUTES (ANESTHESIA): Performed by: ORTHOPAEDIC SURGERY

## 2024-02-05 PROCEDURE — 2709999900 HC NON-CHARGEABLE SUPPLY: Performed by: ORTHOPAEDIC SURGERY

## 2024-02-05 PROCEDURE — 2720000010 HC SURG SUPPLY STERILE: Performed by: ORTHOPAEDIC SURGERY

## 2024-02-05 PROCEDURE — 7100000001 HC PACU RECOVERY - ADDTL 15 MIN: Performed by: ORTHOPAEDIC SURGERY

## 2024-02-05 PROCEDURE — 6360000002 HC RX W HCPCS: Performed by: ORTHOPAEDIC SURGERY

## 2024-02-05 PROCEDURE — 3600000005 HC SURGERY LEVEL 5 BASE: Performed by: ORTHOPAEDIC SURGERY

## 2024-02-05 PROCEDURE — C1776 JOINT DEVICE (IMPLANTABLE): HCPCS | Performed by: ORTHOPAEDIC SURGERY

## 2024-02-05 PROCEDURE — 6370000000 HC RX 637 (ALT 250 FOR IP): Performed by: ANESTHESIOLOGY

## 2024-02-05 PROCEDURE — 64447 NJX AA&/STRD FEMORAL NRV IMG: CPT | Performed by: ANESTHESIOLOGY

## 2024-02-05 PROCEDURE — 3600000015 HC SURGERY LEVEL 5 ADDTL 15MIN: Performed by: ORTHOPAEDIC SURGERY

## 2024-02-05 DEVICE — CRUCIATE RETAINING FEMORAL
Type: IMPLANTABLE DEVICE | Site: KNEE | Status: FUNCTIONAL
Brand: TRIATHLON

## 2024-02-05 DEVICE — TIBIAL BEARING INSERT - CR
Type: IMPLANTABLE DEVICE | Site: KNEE | Status: FUNCTIONAL
Brand: TRIATHLON

## 2024-02-05 DEVICE — COMPONENT TOT KNEE CAPPED PRIMARY K2STRYKER] STRYKER CORP]: Type: IMPLANTABLE DEVICE | Site: KNEE | Status: FUNCTIONAL

## 2024-02-05 DEVICE — TIBIAL COMPONENT
Type: IMPLANTABLE DEVICE | Site: KNEE | Status: FUNCTIONAL
Brand: TRIATHLON

## 2024-02-05 DEVICE — PATELLA
Type: IMPLANTABLE DEVICE | Site: KNEE | Status: FUNCTIONAL
Brand: TRIATHLON

## 2024-02-05 RX ORDER — SODIUM CHLORIDE, SODIUM LACTATE, POTASSIUM CHLORIDE, CALCIUM CHLORIDE 600; 310; 30; 20 MG/100ML; MG/100ML; MG/100ML; MG/100ML
INJECTION, SOLUTION INTRAVENOUS CONTINUOUS
Status: DISCONTINUED | OUTPATIENT
Start: 2024-02-05 | End: 2024-02-05 | Stop reason: HOSPADM

## 2024-02-05 RX ORDER — DOCUSATE SODIUM 100 MG/1
100 CAPSULE, LIQUID FILLED ORAL DAILY
Status: CANCELLED | OUTPATIENT
Start: 2024-02-05

## 2024-02-05 RX ORDER — FENTANYL CITRATE 50 UG/ML
INJECTION, SOLUTION INTRAMUSCULAR; INTRAVENOUS PRN
Status: DISCONTINUED | OUTPATIENT
Start: 2024-02-05 | End: 2024-02-05 | Stop reason: SDUPTHER

## 2024-02-05 RX ORDER — SODIUM CHLORIDE 0.9 % (FLUSH) 0.9 %
5-40 SYRINGE (ML) INJECTION PRN
OUTPATIENT
Start: 2024-02-05

## 2024-02-05 RX ORDER — LIDOCAINE HYDROCHLORIDE 10 MG/ML
1 INJECTION, SOLUTION EPIDURAL; INFILTRATION; INTRACAUDAL; PERINEURAL
Status: DISCONTINUED | OUTPATIENT
Start: 2024-02-05 | End: 2024-02-05 | Stop reason: HOSPADM

## 2024-02-05 RX ORDER — ONDANSETRON 2 MG/ML
4 INJECTION INTRAMUSCULAR; INTRAVENOUS EVERY 6 HOURS PRN
OUTPATIENT
Start: 2024-02-05

## 2024-02-05 RX ORDER — HYDROMORPHONE HYDROCHLORIDE 2 MG/ML
INJECTION, SOLUTION INTRAMUSCULAR; INTRAVENOUS; SUBCUTANEOUS PRN
Status: DISCONTINUED | OUTPATIENT
Start: 2024-02-05 | End: 2024-02-05 | Stop reason: SDUPTHER

## 2024-02-05 RX ORDER — DEXAMETHASONE SODIUM PHOSPHATE 4 MG/ML
INJECTION, SOLUTION INTRA-ARTICULAR; INTRALESIONAL; INTRAMUSCULAR; INTRAVENOUS; SOFT TISSUE PRN
Status: DISCONTINUED | OUTPATIENT
Start: 2024-02-05 | End: 2024-02-05 | Stop reason: SDUPTHER

## 2024-02-05 RX ORDER — DIPHENHYDRAMINE HCL 25 MG
25 CAPSULE ORAL EVERY 6 HOURS PRN
OUTPATIENT
Start: 2024-02-05

## 2024-02-05 RX ORDER — DIPHENHYDRAMINE HYDROCHLORIDE 50 MG/ML
12.5 INJECTION INTRAMUSCULAR; INTRAVENOUS
Status: DISCONTINUED | OUTPATIENT
Start: 2024-02-05 | End: 2024-02-05 | Stop reason: HOSPADM

## 2024-02-05 RX ORDER — TRAMADOL HYDROCHLORIDE 50 MG/1
50 TABLET ORAL EVERY 6 HOURS PRN
Qty: 30 TABLET | Refills: 0 | Status: SHIPPED | OUTPATIENT
Start: 2024-02-05 | End: 2024-02-12

## 2024-02-05 RX ORDER — FENTANYL CITRATE 50 UG/ML
100 INJECTION, SOLUTION INTRAMUSCULAR; INTRAVENOUS
Status: DISCONTINUED | OUTPATIENT
Start: 2024-02-05 | End: 2024-02-05 | Stop reason: HOSPADM

## 2024-02-05 RX ORDER — BACLOFEN 10 MG/1
10 TABLET ORAL AS NEEDED
Status: CANCELLED | OUTPATIENT
Start: 2024-02-05

## 2024-02-05 RX ORDER — ONDANSETRON 4 MG/1
4 TABLET, ORALLY DISINTEGRATING ORAL EVERY 8 HOURS PRN
OUTPATIENT
Start: 2024-02-05

## 2024-02-05 RX ORDER — ASPIRIN 81 MG/1
81 TABLET ORAL 2 TIMES DAILY
Qty: 60 TABLET | Refills: 3 | Status: SHIPPED | OUTPATIENT
Start: 2024-02-05

## 2024-02-05 RX ORDER — DEXMEDETOMIDINE HYDROCHLORIDE 100 UG/ML
INJECTION, SOLUTION INTRAVENOUS PRN
Status: DISCONTINUED | OUTPATIENT
Start: 2024-02-05 | End: 2024-02-05 | Stop reason: SDUPTHER

## 2024-02-05 RX ORDER — SODIUM CHLORIDE 9 MG/ML
INJECTION, SOLUTION INTRAVENOUS CONTINUOUS
OUTPATIENT
Start: 2024-02-05

## 2024-02-05 RX ORDER — ROPIVACAINE HYDROCHLORIDE 2 MG/ML
INJECTION, SOLUTION EPIDURAL; INFILTRATION; PERINEURAL PRN
Status: DISCONTINUED | OUTPATIENT
Start: 2024-02-05 | End: 2024-02-05 | Stop reason: SDUPTHER

## 2024-02-05 RX ORDER — OXYCODONE HYDROCHLORIDE 5 MG/1
5 TABLET ORAL EVERY 4 HOURS PRN
Qty: 42 TABLET | Refills: 0 | Status: SHIPPED | OUTPATIENT
Start: 2024-02-05 | End: 2024-02-12

## 2024-02-05 RX ORDER — SODIUM CHLORIDE 9 MG/ML
INJECTION, SOLUTION INTRAVENOUS PRN
OUTPATIENT
Start: 2024-02-05

## 2024-02-05 RX ORDER — IBUPROFEN 800 MG/1
800 TABLET ORAL EVERY 6 HOURS
Qty: 60 TABLET | Refills: 0 | Status: SHIPPED | OUTPATIENT
Start: 2024-02-05

## 2024-02-05 RX ORDER — GLYCOPYRROLATE 0.2 MG/ML
INJECTION INTRAMUSCULAR; INTRAVENOUS PRN
Status: DISCONTINUED | OUTPATIENT
Start: 2024-02-05 | End: 2024-02-05 | Stop reason: SDUPTHER

## 2024-02-05 RX ORDER — CELECOXIB 100 MG/1
100 CAPSULE ORAL ONCE
Status: COMPLETED | OUTPATIENT
Start: 2024-02-05 | End: 2024-02-05

## 2024-02-05 RX ORDER — OXYCODONE HCL 10 MG/1
10 TABLET, FILM COATED, EXTENDED RELEASE ORAL ONCE
Status: COMPLETED | OUTPATIENT
Start: 2024-02-05 | End: 2024-02-05

## 2024-02-05 RX ORDER — NEOSTIGMINE METHYLSULFATE 1 MG/ML
INJECTION, SOLUTION INTRAVENOUS PRN
Status: DISCONTINUED | OUTPATIENT
Start: 2024-02-05 | End: 2024-02-05 | Stop reason: SDUPTHER

## 2024-02-05 RX ORDER — ASPIRIN 81 MG/1
81 TABLET ORAL 2 TIMES DAILY
OUTPATIENT
Start: 2024-02-05

## 2024-02-05 RX ORDER — LISINOPRIL 5 MG/1
10 TABLET ORAL DAILY
Status: CANCELLED | OUTPATIENT
Start: 2024-02-05

## 2024-02-05 RX ORDER — PREGABALIN 75 MG/1
75 CAPSULE ORAL ONCE
Status: COMPLETED | OUTPATIENT
Start: 2024-02-05 | End: 2024-02-05

## 2024-02-05 RX ORDER — PROPOFOL 10 MG/ML
INJECTION, EMULSION INTRAVENOUS CONTINUOUS PRN
Status: DISCONTINUED | OUTPATIENT
Start: 2024-02-05 | End: 2024-02-05 | Stop reason: SDUPTHER

## 2024-02-05 RX ORDER — HYDROMORPHONE HYDROCHLORIDE 1 MG/ML
1 INJECTION, SOLUTION INTRAMUSCULAR; INTRAVENOUS; SUBCUTANEOUS
OUTPATIENT
Start: 2024-02-05

## 2024-02-05 RX ORDER — NALOXONE HYDROCHLORIDE 0.4 MG/ML
INJECTION, SOLUTION INTRAMUSCULAR; INTRAVENOUS; SUBCUTANEOUS PRN
Status: DISCONTINUED | OUTPATIENT
Start: 2024-02-05 | End: 2024-02-05 | Stop reason: SDUPTHER

## 2024-02-05 RX ORDER — TRIAMTERENE AND HYDROCHLOROTHIAZIDE 37.5; 25 MG/1; MG/1
1 CAPSULE ORAL DAILY
Status: CANCELLED | OUTPATIENT
Start: 2024-02-05

## 2024-02-05 RX ORDER — POLYETHYLENE GLYCOL 3350 17 G/17G
17 POWDER, FOR SOLUTION ORAL DAILY
OUTPATIENT
Start: 2024-02-05

## 2024-02-05 RX ORDER — TRANEXAMIC ACID 100 MG/ML
INJECTION, SOLUTION INTRAVENOUS PRN
Status: DISCONTINUED | OUTPATIENT
Start: 2024-02-05 | End: 2024-02-05 | Stop reason: SDUPTHER

## 2024-02-05 RX ORDER — OXYCODONE HYDROCHLORIDE 5 MG/1
10 TABLET ORAL
OUTPATIENT
Start: 2024-02-05

## 2024-02-05 RX ORDER — OXYCODONE HYDROCHLORIDE 5 MG/1
5 TABLET ORAL
Status: DISCONTINUED | OUTPATIENT
Start: 2024-02-05 | End: 2024-02-05 | Stop reason: HOSPADM

## 2024-02-05 RX ORDER — SUCCINYLCHOLINE/SOD CL,ISO/PF 100 MG/5ML
SYRINGE (ML) INTRAVENOUS PRN
Status: DISCONTINUED | OUTPATIENT
Start: 2024-02-05 | End: 2024-02-05 | Stop reason: SDUPTHER

## 2024-02-05 RX ORDER — ONDANSETRON 2 MG/ML
4 INJECTION INTRAMUSCULAR; INTRAVENOUS
Status: DISCONTINUED | OUTPATIENT
Start: 2024-02-05 | End: 2024-02-05 | Stop reason: HOSPADM

## 2024-02-05 RX ORDER — OXYCODONE HYDROCHLORIDE 5 MG/1
5 TABLET ORAL
OUTPATIENT
Start: 2024-02-05

## 2024-02-05 RX ORDER — FENTANYL CITRATE 50 UG/ML
25 INJECTION, SOLUTION INTRAMUSCULAR; INTRAVENOUS EVERY 5 MIN PRN
Status: DISCONTINUED | OUTPATIENT
Start: 2024-02-05 | End: 2024-02-05 | Stop reason: HOSPADM

## 2024-02-05 RX ORDER — ONDANSETRON 4 MG/1
4 TABLET, ORALLY DISINTEGRATING ORAL EVERY 8 HOURS PRN
Qty: 10 TABLET | Refills: 0 | Status: SHIPPED | OUTPATIENT
Start: 2024-02-05

## 2024-02-05 RX ORDER — BISACODYL 10 MG
10 SUPPOSITORY, RECTAL RECTAL DAILY PRN
OUTPATIENT
Start: 2024-02-05

## 2024-02-05 RX ORDER — GABAPENTIN 600 MG/1
300 TABLET ORAL PRN
Status: CANCELLED | OUTPATIENT
Start: 2024-02-05

## 2024-02-05 RX ORDER — PHENYLEPHRINE HYDROCHLORIDE 10 MG/ML
INJECTION INTRAVENOUS PRN
Status: DISCONTINUED | OUTPATIENT
Start: 2024-02-05 | End: 2024-02-05 | Stop reason: SDUPTHER

## 2024-02-05 RX ORDER — ACETAMINOPHEN 325 MG/1
650 TABLET ORAL EVERY 6 HOURS
OUTPATIENT
Start: 2024-02-05

## 2024-02-05 RX ORDER — MIDAZOLAM HYDROCHLORIDE 1 MG/ML
INJECTION INTRAMUSCULAR; INTRAVENOUS PRN
Status: DISCONTINUED | OUTPATIENT
Start: 2024-02-05 | End: 2024-02-05 | Stop reason: SDUPTHER

## 2024-02-05 RX ORDER — ONDANSETRON 2 MG/ML
INJECTION INTRAMUSCULAR; INTRAVENOUS PRN
Status: DISCONTINUED | OUTPATIENT
Start: 2024-02-05 | End: 2024-02-05 | Stop reason: SDUPTHER

## 2024-02-05 RX ORDER — BISACODYL 5 MG/1
5 TABLET, DELAYED RELEASE ORAL DAILY PRN
OUTPATIENT
Start: 2024-02-05

## 2024-02-05 RX ORDER — FAMOTIDINE 20 MG/1
20 TABLET, FILM COATED ORAL 2 TIMES DAILY
OUTPATIENT
Start: 2024-02-05

## 2024-02-05 RX ORDER — ACETAMINOPHEN 325 MG/1
975 TABLET ORAL ONCE
Status: COMPLETED | OUTPATIENT
Start: 2024-02-05 | End: 2024-02-05

## 2024-02-05 RX ORDER — ROCURONIUM BROMIDE 10 MG/ML
INJECTION, SOLUTION INTRAVENOUS PRN
Status: DISCONTINUED | OUTPATIENT
Start: 2024-02-05 | End: 2024-02-05 | Stop reason: SDUPTHER

## 2024-02-05 RX ORDER — ACETAMINOPHEN 325 MG/1
650 TABLET ORAL EVERY 4 HOURS
Qty: 120 TABLET | Refills: 1 | Status: SHIPPED | OUTPATIENT
Start: 2024-02-05 | End: 2024-03-06

## 2024-02-05 RX ORDER — SODIUM CHLORIDE 0.9 % (FLUSH) 0.9 %
5-40 SYRINGE (ML) INJECTION EVERY 12 HOURS SCHEDULED
OUTPATIENT
Start: 2024-02-05

## 2024-02-05 RX ORDER — KETOROLAC TROMETHAMINE 15 MG/ML
15 INJECTION, SOLUTION INTRAMUSCULAR; INTRAVENOUS
Status: DISCONTINUED | OUTPATIENT
Start: 2024-02-05 | End: 2024-02-05 | Stop reason: HOSPADM

## 2024-02-05 RX ORDER — FLUMAZENIL 0.1 MG/ML
INJECTION INTRAVENOUS PRN
Status: DISCONTINUED | OUTPATIENT
Start: 2024-02-05 | End: 2024-02-05 | Stop reason: SDUPTHER

## 2024-02-05 RX ORDER — 0.9 % SODIUM CHLORIDE 0.9 %
500 INTRAVENOUS SOLUTION INTRAVENOUS ONCE
OUTPATIENT
Start: 2024-02-05 | End: 2024-02-05

## 2024-02-05 RX ORDER — DIPHENHYDRAMINE HYDROCHLORIDE 50 MG/ML
25 INJECTION INTRAMUSCULAR; INTRAVENOUS EVERY 6 HOURS PRN
OUTPATIENT
Start: 2024-02-05

## 2024-02-05 RX ORDER — MIDAZOLAM HYDROCHLORIDE 2 MG/2ML
2 INJECTION, SOLUTION INTRAMUSCULAR; INTRAVENOUS
Status: DISCONTINUED | OUTPATIENT
Start: 2024-02-05 | End: 2024-02-05 | Stop reason: HOSPADM

## 2024-02-05 RX ADMIN — PREGABALIN 75 MG: 75 CAPSULE ORAL at 10:07

## 2024-02-05 RX ADMIN — SODIUM CHLORIDE, POTASSIUM CHLORIDE, SODIUM LACTATE AND CALCIUM CHLORIDE: 600; 310; 30; 20 INJECTION, SOLUTION INTRAVENOUS at 10:13

## 2024-02-05 RX ADMIN — GLYCOPYRROLATE 0.2 MG: 0.2 INJECTION INTRAMUSCULAR; INTRAVENOUS at 12:04

## 2024-02-05 RX ADMIN — DEXMEDETOMIDINE 6 MCG: 100 INJECTION, SOLUTION INTRAVENOUS at 11:30

## 2024-02-05 RX ADMIN — FLUMAZENIL 0.3 MG: 0.1 INJECTION, SOLUTION INTRAVENOUS at 14:01

## 2024-02-05 RX ADMIN — ROCURONIUM BROMIDE 5 MG: 10 INJECTION, SOLUTION INTRAVENOUS at 11:33

## 2024-02-05 RX ADMIN — ACETAMINOPHEN 975 MG: 325 TABLET ORAL at 10:07

## 2024-02-05 RX ADMIN — Medication 140 MG: at 11:33

## 2024-02-05 RX ADMIN — TRANEXAMIC ACID 1000 MG: 100 INJECTION, SOLUTION INTRAVENOUS at 11:51

## 2024-02-05 RX ADMIN — LIDOCAINE HYDROCHLORIDE 120 MG: 20 INJECTION, SOLUTION INFILTRATION; PERINEURAL at 11:33

## 2024-02-05 RX ADMIN — PHENYLEPHRINE HYDROCHLORIDE 80 MCG: 10 INJECTION INTRAVENOUS at 12:03

## 2024-02-05 RX ADMIN — PROPOFOL 50 MCG/KG/MIN: 10 INJECTION, EMULSION INTRAVENOUS at 11:41

## 2024-02-05 RX ADMIN — ONDANSETRON 4 MG: 2 INJECTION INTRAMUSCULAR; INTRAVENOUS at 11:49

## 2024-02-05 RX ADMIN — FLUMAZENIL 0.2 MG: 0.1 INJECTION, SOLUTION INTRAVENOUS at 13:59

## 2024-02-05 RX ADMIN — HYDROMORPHONE HYDROCHLORIDE 0.3 MG: 2 INJECTION, SOLUTION INTRAMUSCULAR; INTRAVENOUS; SUBCUTANEOUS at 12:16

## 2024-02-05 RX ADMIN — TRANEXAMIC ACID 1000 MG: 100 INJECTION, SOLUTION INTRAVENOUS at 13:14

## 2024-02-05 RX ADMIN — ROCURONIUM BROMIDE 45 MG: 10 INJECTION, SOLUTION INTRAVENOUS at 11:42

## 2024-02-05 RX ADMIN — CELECOXIB 100 MG: 100 CAPSULE ORAL at 10:07

## 2024-02-05 RX ADMIN — NALOXONE HYDROCHLORIDE 0.04 MG: 0.4 INJECTION, SOLUTION INTRAMUSCULAR; INTRAVENOUS; SUBCUTANEOUS at 13:58

## 2024-02-05 RX ADMIN — WATER 2000 MG: 1 INJECTION INTRAMUSCULAR; INTRAVENOUS; SUBCUTANEOUS at 11:48

## 2024-02-05 RX ADMIN — DEXMEDETOMIDINE 6 MCG: 100 INJECTION, SOLUTION INTRAVENOUS at 11:24

## 2024-02-05 RX ADMIN — PHENYLEPHRINE HYDROCHLORIDE 40 MCG: 10 INJECTION INTRAVENOUS at 11:52

## 2024-02-05 RX ADMIN — PHENYLEPHRINE HYDROCHLORIDE 80 MCG: 10 INJECTION INTRAVENOUS at 12:31

## 2024-02-05 RX ADMIN — PHENYLEPHRINE HYDROCHLORIDE 120 MCG: 10 INJECTION INTRAVENOUS at 12:53

## 2024-02-05 RX ADMIN — Medication 3 MG: at 13:17

## 2024-02-05 RX ADMIN — GLYCOPYRROLATE 0.4 MG: 0.2 INJECTION INTRAMUSCULAR; INTRAVENOUS at 13:17

## 2024-02-05 RX ADMIN — FENTANYL CITRATE 100 MCG: 50 INJECTION, SOLUTION INTRAMUSCULAR; INTRAVENOUS at 11:02

## 2024-02-05 RX ADMIN — PHENYLEPHRINE HYDROCHLORIDE 120 MCG: 10 INJECTION INTRAVENOUS at 12:06

## 2024-02-05 RX ADMIN — ROPIVACAINE HYDROCHLORIDE 20 ML: 2 INJECTION, SOLUTION EPIDURAL; INFILTRATION at 11:12

## 2024-02-05 RX ADMIN — DEXAMETHASONE SODIUM PHOSPHATE 4 MG: 4 INJECTION INTRA-ARTICULAR; INTRALESIONAL; INTRAMUSCULAR; INTRAVENOUS; SOFT TISSUE at 11:49

## 2024-02-05 RX ADMIN — OXYCODONE HYDROCHLORIDE 10 MG: 10 TABLET, FILM COATED, EXTENDED RELEASE ORAL at 10:07

## 2024-02-05 RX ADMIN — MIDAZOLAM HYDROCHLORIDE 2 MG: 1 INJECTION, SOLUTION INTRAMUSCULAR; INTRAVENOUS at 11:02

## 2024-02-05 RX ADMIN — PROPOFOL 100 MG: 10 INJECTION, EMULSION INTRAVENOUS at 11:33

## 2024-02-05 RX ADMIN — HYDROMORPHONE HYDROCHLORIDE 0.2 MG: 2 INJECTION, SOLUTION INTRAMUSCULAR; INTRAVENOUS; SUBCUTANEOUS at 12:09

## 2024-02-05 RX ADMIN — NALOXONE HYDROCHLORIDE 0.04 MG: 0.4 INJECTION, SOLUTION INTRAMUSCULAR; INTRAVENOUS; SUBCUTANEOUS at 13:51

## 2024-02-05 RX ADMIN — PHENYLEPHRINE HYDROCHLORIDE 80 MCG: 10 INJECTION INTRAVENOUS at 11:57

## 2024-02-05 RX ADMIN — NALOXONE HYDROCHLORIDE 0.04 MG: 0.4 INJECTION, SOLUTION INTRAMUSCULAR; INTRAVENOUS; SUBCUTANEOUS at 13:54

## 2024-02-05 ASSESSMENT — PAIN DESCRIPTION - FREQUENCY
FREQUENCY: CONTINUOUS
FREQUENCY: CONTINUOUS

## 2024-02-05 ASSESSMENT — PAIN DESCRIPTION - LOCATION
LOCATION: KNEE
LOCATION: KNEE

## 2024-02-05 ASSESSMENT — PAIN DESCRIPTION - PAIN TYPE
TYPE: SURGICAL PAIN
TYPE: SURGICAL PAIN

## 2024-02-05 ASSESSMENT — PAIN DESCRIPTION - ORIENTATION
ORIENTATION: RIGHT
ORIENTATION: RIGHT

## 2024-02-05 ASSESSMENT — PAIN - FUNCTIONAL ASSESSMENT
PAIN_FUNCTIONAL_ASSESSMENT: 0-10
PAIN_FUNCTIONAL_ASSESSMENT: ACTIVITIES ARE NOT PREVENTED

## 2024-02-05 ASSESSMENT — PAIN DESCRIPTION - DESCRIPTORS
DESCRIPTORS: ACHING
DESCRIPTORS: ACHING

## 2024-02-05 ASSESSMENT — PAIN DESCRIPTION - ONSET
ONSET: PROGRESSIVE
ONSET: ON-GOING

## 2024-02-05 ASSESSMENT — PAIN SCALES - GENERAL
PAINLEVEL_OUTOF10: 3
PAINLEVEL_OUTOF10: 3

## 2024-02-05 NOTE — DISCHARGE INSTRUCTIONS
TOTAL KNEE DISCHARGE INSTRUCTIONS      Patient: Marcelle Knight MRN: [id number]  SSN: [id number]              Please take the time to review the following instructions before you leave the hospital and use them as guidelines during your recovery from surgery.  If you have any questions you may contact my office at [phone]  After business hours or during the weekend you can contact me through [employer] [Preferred] or text / call at [phone] (cell phone) for emergency's. Please use the office number during regular business hours.    SPECIAL INSTRUCTIONS :   1. Full extension at the knee is the most important aspect of your range of motion. Avoid placing a pillow or bump behind the knee. Rather, place the heel up on a bump or pillow and allow gravity to help straighten the knee.   2. You may weight bear as tolerated on the knee and during the day you should bend the knee as much as possible.   3. Drainage from the incision more than 4 days from surgery is concerning. Contact my office if there is any question [phone].   4. You may contact me directly through [employer] if there are specific questions or text / call using my cell number [phone].      DRESSING :     Post-op Dressings : This should be removed by physical therapy or you may remove this yourself 7 days after the date of your surgery. If there is no drainage, then a simple dressing may be used or no dressing at all. Other dressing options can be purchased over the counter at a local pharmacy or medical supply vendor.        A porous adhesive dressing such as pictured above can be purchased online (Amazon) or at your local [facility] or [place]. You only need to keep the incision covered for 7 days after showers. A dressing may be used for longer if there are issues with clothing clinging to the incision.         Showering/ Bathing:    You may shower with the Post-op dressing in place. This is left in place for 7

## 2024-02-05 NOTE — PROGRESS NOTES
PHYSICAL THERAPY EVALUATION/DISCHARGE    Patient: Marcelle Knight (73 y.o. female)  Date: 2/5/2024  Primary Diagnosis: Primary osteoarthritis of right knee [M17.11]  Procedure(s) (LRB):  RIGHT TOTAL KNEE ARTHROPLASTY-MIRIAN (GEN/FEMORAL AND SCIATIC NERVE BLOCKS) (Right) Day of Surgery   Precautions:                        ASSESSMENT AND RECOMMENDATIONS:  Based on the objective data described below, the patient presents with good return of strength and sensation following RTKA POD#0. Pt able to perform full HEP with verbal cueing for proper performance including SLR, SAQ and isometrics. Pt transfers to EOB independently and maintains stable vitals throughout. Stands with SUP, verbal cueing for hand placement and use of RW for support. No evidence of buckling with standing marching. Ambulates with SUP, 145ft responds well to verbal cueing and with increased time able to achieve step through pattern with equal step length. Tolerates gait distance well and able to negotiate 6 steps with R railing and SBA. Verbally reviewed car transfers, elevation, and icing. Pt is cleared for d/c from a PT standpoint.       Functional Outcome Measure:  The patient scored 24/24 on the [facility] outcome measure.          Further skilled acute physical therapy is not indicated at this time.       PLAN :  Recommendation for discharge: (in order for the patient to meet his/her long term goals): Outpatient physical therapy strengthening and balance post tka    Other factors to consider for discharge: no additional factors    IF patient discharges home will need the following DME: patient owns DME required for discharge       SUBJECTIVE:   Patient stated “Dr Chapman talked me into staying last time.”    OBJECTIVE DATA SUMMARY:     Past Medical History:   Diagnosis Date    Arthritis     Chronic kidney disease     Fibromyalgia     History of prediabetes     Hypertension     Lumbar stenosis     Unspecified vitamin D deficiency      Past Surgical

## 2024-02-05 NOTE — ANESTHESIA POSTPROCEDURE EVALUATION
Department of Anesthesiology  Postprocedure Note    Patient: Marcelle Knight  MRN: [id number]  YOB: 1950  Date of evaluation: 2/5/2024    Procedure Summary       Date: 02/05/24 Room / Location: [facility] ASU OR  / [facility] AMBULATORY OR    Anesthesia Start: 1124 Anesthesia Stop: 1412    Procedure: RIGHT TOTAL KNEE ARTHROPLASTY-MIRIAN (GEN/FEMORAL AND SCIATIC NERVE BLOCKS) (Right: Knee) Diagnosis:       Primary osteoarthritis of right knee      (Primary osteoarthritis of right knee [M17.11])    Surgeons: Alfredito Chapman MD Responsible Provider: Miguel Campoverde MD    Anesthesia Type: General, Regional ASA Status: 2            Anesthesia Type: General, Regional    Jermaine Phase I: Jermaine Score: 8    Jermaine Phase II:      Anesthesia Post Evaluation    Patient location during evaluation: PACU  Patient participation: complete - patient participated  Level of consciousness: awake  Pain score: 0  Airway patency: patent  Nausea & Vomiting: no nausea and no vomiting  Cardiovascular status: blood pressure returned to baseline  Respiratory status: acceptable  Hydration status: euvolemic  Multimodal analgesia pain management approach  Pain management: adequate    No notable events documented.

## 2024-02-05 NOTE — ANESTHESIA PROCEDURE NOTES
Peripheral Block    Patient location during procedure: pre-op  Reason for block: procedure for pain, post-op pain management, primary anesthetic and at surgeon's request  Start time: 2/5/2024 11:02 AM  End time: 2/5/2024 11:12 AM  Staffing  Performed: anesthesiologist   Anesthesiologist: Miguel Campoverde MD  Performed by: Miguel Campoverde MD  Authorized by: Miguel Campoverde MD    Preanesthetic Checklist  Completed: patient identified, IV checked, site marked, risks and benefits discussed, surgical/procedural consents, pre-op evaluation, timeout performed, anesthesia consent given, oxygen available and monitors applied/VS acknowledged  Peripheral Block   Patient position: supine  Prep: ChloraPrep  Provider prep: mask and sterile gloves  Patient monitoring: cardiac monitor, continuous pulse ox, continuous capnometry, frequent blood pressure checks, IV access, oxygen and responsive to questions  Block type: iPacks  Laterality: right  Injection technique: single-shot  Guidance: ultrasound guided    Needle   Needle type: Other   Needle gauge: 21 G  Needle localization: ultrasound guidance  Needle length: 10 cmOther needle type: STIMUPLEX  Assessment   Injection assessment: negative aspiration for heme, no paresthesia on injection, local visualized surrounding nerve on ultrasound and no intravascular symptoms  Hemodynamics: stable  Outcomes: patient tolerated procedure well    Additional Notes  Vilma RN witnessed timeout and block written on correct side.

## 2024-02-05 NOTE — OP NOTE
(Thigh call impingement)    IMPLANTS :   Implant Name Type Inv. Item Serial No.  Lot No. LRB No. Used Action   COMPONENT FEM SZ 4 R KNEE CRUCE RET CEMENTLESS BEAD W/ YUDI - SNA  COMPONENT FEM SZ 4 R KNEE CRUCE RET CEMENTLESS BEAD W/ YUDI NA [employer] [facility] [id number] Right 1 Implanted   COMPONENT PAT [id number] SUPERIOR/INFERIOR KNEE - SNA  COMPONENT PAT [id number] SUPERIOR/INFERIOR KNEE NA FE [employer] [facility] [id number] Right 1 Implanted   INSERT TIB BEAR SZ 5 THK9MM KNEE X3 CRUCE RET TRIATHLON - SNA  INSERT TIB BEAR SZ 5 THK9MM KNEE X3 CRUCE RET TRIATHLON NA [employer] [facility] [id number] Right 1 Implanted   BASEPLATE TIB SZ 5 AP49MM ML74MM KNEE TRITANIUM 4 CRUCFRM - SNA  BASEPLATE TIB SZ 5 AP49MM ML74MM KNEE TRITANIUM 4 CRUCFRM NA [employer] [facility] [id number] Right 1 Implanted       POST OPERATIVE CONSIDERATIONS :  WBAT    JUSTIFICATION FOR SURGICAL ASSISTANT:   Surgical Assistant, was requried and necessary in this case, to help with soft tissue retraction, extremity positioning, equiment management, implant management, and wound closure.    Alfredito Chapman MD

## 2024-02-05 NOTE — ANESTHESIA PRE PROCEDURE
10:46 AM    RDW 14.3 01/22/2024 10:46 AM     01/22/2024 10:46 AM       CMP:   Lab Results   Component Value Date/Time     01/22/2024 10:46 AM    K 3.9 01/22/2024 10:46 AM     01/22/2024 10:46 AM    CO2 31 01/22/2024 10:46 AM    BUN 16 01/22/2024 10:46 AM    CREATININE 0.99 01/22/2024 10:46 AM    GFRAA 53 06/07/2022 04:08 AM    AGRATIO 0.8 01/22/2024 10:46 AM    AGRATIO 0.8 06/01/2022 10:49 AM    LABGLOM 60 01/22/2024 10:46 AM    GLUCOSE 103 01/22/2024 10:46 AM    PROT 8.3 01/22/2024 10:46 AM    CALCIUM 9.5 01/22/2024 10:46 AM    BILITOT 0.3 01/22/2024 10:46 AM    ALKPHOS 87 01/22/2024 10:46 AM    ALKPHOS 90 06/01/2022 10:49 AM    AST 16 01/22/2024 10:46 AM    ALT 17 01/22/2024 10:46 AM       POC Tests: No results for input(s): \"POCGLU\", \"POCNA\", \"POCK\", \"POCCL\", \"POCBUN\", \"POCHEMO\", \"POCHCT\" in the last 72 hours.    Coags:   Lab Results   Component Value Date/Time    PROTIME 10.8 03/04/2021 04:09 PM    INR 1.0 03/04/2021 04:09 PM    APTT 25.2 03/04/2021 04:09 PM       HCG (If Applicable): No results found for: \"PREGTESTUR\", \"PREGSERUM\", \"HCG\", \"HCGQUANT\"     ABGs: No results found for: \"PHART\", \"PO2ART\", \"ADU1UEH\", \"UVF2AJO\", \"BEART\", \"C2IHBYFC\"     Type & Screen (If Applicable):  No results found for: \"LABABO\", \"LABRH\"    Drug/Infectious Status (If Applicable):  Lab Results   Component Value Date/Time    HEPCAB <0.1 03/30/2016 09:59 AM       COVID-19 Screening (If Applicable):   Lab Results   Component Value Date/Time    COVID19 Not Detected 03/12/2021 07:49 AM           Anesthesia Evaluation  Patient summary reviewed and Nursing notes reviewed  Airway: Mallampati: II  TM distance: >3 FB   Neck ROM: full  Mouth opening: > = 3 FB   Dental:    (+) poor dentition      Pulmonary: breath sounds clear to auscultation                             Cardiovascular:  Exercise tolerance: good (>4 METS)  (+) hypertension:        Rhythm: regular  Rate: normal                    Neuro/Psych:   (+) neuromuscular

## 2024-02-05 NOTE — PERIOP NOTE
1415 - No floor orders/post op orders received, Dr Chapman made aware.    1445 - Floor/post op/Discharge instructions received.  Admission order released.    1515 - Xrays done, pt taking PO, pt reports minor aching to knee.    1520 - Report to Shauna ELY, pt care transferred at this time.

## 2024-03-04 ENCOUNTER — TRANSCRIBE ORDERS (OUTPATIENT)
Facility: HOSPITAL | Age: 74
End: 2024-03-04

## 2024-03-04 DIAGNOSIS — M51.36 DDD (DEGENERATIVE DISC DISEASE), LUMBAR: ICD-10-CM

## 2024-03-04 DIAGNOSIS — Z98.1 S/P SPINAL FUSION: ICD-10-CM

## 2024-03-04 DIAGNOSIS — M54.50 LOW BACK PAIN, UNSPECIFIED BACK PAIN LATERALITY, UNSPECIFIED CHRONICITY, UNSPECIFIED WHETHER SCIATICA PRESENT: ICD-10-CM

## 2024-03-04 DIAGNOSIS — M47.816 LUMBAR SPONDYLOSIS: ICD-10-CM

## 2024-03-04 DIAGNOSIS — M48.062 PSEUDOCLAUDICATION SYNDROME: Primary | ICD-10-CM

## 2024-03-12 ENCOUNTER — HOSPITAL ENCOUNTER (OUTPATIENT)
Facility: HOSPITAL | Age: 74
Discharge: HOME | End: 2024-03-15
Attending: PHYSICAL MEDICINE & REHABILITATION
Payer: MEDICARE

## 2024-03-12 DIAGNOSIS — Z98.1 S/P SPINAL FUSION: ICD-10-CM

## 2024-03-12 DIAGNOSIS — M51.36 DDD (DEGENERATIVE DISC DISEASE), LUMBAR: ICD-10-CM

## 2024-03-12 DIAGNOSIS — M48.062 PSEUDOCLAUDICATION SYNDROME: ICD-10-CM

## 2024-03-12 DIAGNOSIS — M54.50 LOW BACK PAIN, UNSPECIFIED BACK PAIN LATERALITY, UNSPECIFIED CHRONICITY, UNSPECIFIED WHETHER SCIATICA PRESENT: ICD-10-CM

## 2024-03-12 DIAGNOSIS — M47.816 LUMBAR SPONDYLOSIS: ICD-10-CM

## 2024-03-12 PROCEDURE — 72148 MRI LUMBAR SPINE W/O DYE: CPT

## (undated) DEVICE — BLUNTFILL: Brand: MONOJECT

## (undated) DEVICE — DRESSING ANTIMIC FOAM OPTIFOAM POSTOP ADH 4X14 IN

## (undated) DEVICE — SUTURE ABSORBABLE 3-0 PS-1 18 IN UD MONOCRYL + STRATAFIX SXMP1B102

## (undated) DEVICE — MAGNETIC INSTR DRAPE 20X16: Brand: MEDLINE INDUSTRIES, INC.

## (undated) DEVICE — STRAP,POSITIONING,KNEE/BODY,FOAM,4X60": Brand: MEDLINE

## (undated) DEVICE — CONTAINER,SPECIMEN,3OZ,OR STRL: Brand: MEDLINE

## (undated) DEVICE — SUTURE VCRL + SZ 1-0 L36IN ABSRB UD CTX 1/2 CIR TAPR PNT VCP977H

## (undated) DEVICE — Device: Brand: JELCO

## (undated) DEVICE — 1010 S-DRAPE TOWEL DRAPE 10/BX: Brand: STERI-DRAPE™

## (undated) DEVICE — 3M™ TEGADERM™ TRANSPARENT FILM DRESSING FRAME STYLE, 1624W, 2-3/8 IN X 2-3/4 IN (6 CM X 7 CM), 100/CT 4CT/CASE: Brand: 3M™ TEGADERM™

## (undated) DEVICE — PREP KIT PEEL PTCH POVIDONE IOD

## (undated) DEVICE — BANDAGE COMPR W6INXL10YD ST M E WHITE/BEIGE

## (undated) DEVICE — ELECTRODE BLDE L4IN NONINSULATED EDGE

## (undated) DEVICE — STRYKER PERFORMANCE SERIES SAGITTAL BLADE: Brand: STRYKER PERFORMANCE SERIES

## (undated) DEVICE — SUTURE STRATAFIX SYMMETRIC SZ 1 L18IN ABSRB VLT CT1 L36CM SXPP1A404

## (undated) DEVICE — SOLUTION IRRIG 3000ML 0.9% SOD CHL USP UROMATIC PLAS CONT

## (undated) DEVICE — COVER,MAYO STAND,XL,STERILE: Brand: MEDLINE

## (undated) DEVICE — TIP CLEANER: Brand: VALLEYLAB

## (undated) DEVICE — STERILE POLYISOPRENE POWDER-FREE SURGICAL GLOVES WITH EMOLLIENT COATING: Brand: PROTEXIS

## (undated) DEVICE — STERILE POLYISOPRENE POWDER-FREE SURGICAL GLOVES: Brand: PROTEXIS

## (undated) DEVICE — SUTURE STRATAFIX SPRL MCRYL + 3 0 SGL ARMED PS 1 24 IN LEN SXMP1B103

## (undated) DEVICE — 3M™ TEGADERM™ TRANSPARENT FILM DRESSING FRAME STYLE, 1626, 4 IN X 4-3/4 IN (10 CM X 12 CM), 50/CT 4CT/CASE: Brand: 3M™ TEGADERM™

## (undated) DEVICE — TUBING, SUCTION, 1/4" X 12', STRAIGHT: Brand: MEDLINE

## (undated) DEVICE — COVER,MAYO STAND,STERILE: Brand: MEDLINE

## (undated) DEVICE — HOOD: Brand: FLYTE

## (undated) DEVICE — KIT DRP FOR RIO ROBOTIC ARM ASST SYS

## (undated) DEVICE — 4-PORT MANIFOLD: Brand: NEPTUNE 2

## (undated) DEVICE — SOL IRR STRL H2O 1000ML BTL --

## (undated) DEVICE — GLOVE SURG SZ 9 L12IN FNGR THK79MIL GRN LTX FREE

## (undated) DEVICE — ADHESIVE SKIN CLOSURE 4X22 CM PREMIERPRO EXOFINFUSION DISP

## (undated) DEVICE — SUTURE VCRL SZ 2-0 L36IN ABSRB UD L36MM CT-1 1/2 CIR J945H

## (undated) DEVICE — CANISTER, RIGID, 3000CC: Brand: MEDLINE INDUSTRIES, INC.

## (undated) DEVICE — SYR 20ML LL STRL LF --

## (undated) DEVICE — GLOVE SURG SZ 85 L12IN FNGR ORTHO 126MIL CRM LTX FREE

## (undated) DEVICE — GLOVE SURG SZ 85 L12IN FNGR THK79MIL GRN LTX FREE

## (undated) DEVICE — SPONGE GZ W4XL4IN COT 12 PLY TYP VII WVN C FLD DSGN STERILE

## (undated) DEVICE — INFECTION CONTROL KIT SYS

## (undated) DEVICE — PADDING CST 6IN STERILE --

## (undated) DEVICE — SPONGE GZ W4XL4IN COT 12 PLY TYP VII WVN C FLD DSGN

## (undated) DEVICE — COVER,TABLE,60X90,STERILE: Brand: MEDLINE

## (undated) DEVICE — THE MILL DISPOSABLE - MEDIUM

## (undated) DEVICE — GOWN,SIRUS,NONRNF,SETINSLV,XL,20/CS: Brand: MEDLINE

## (undated) DEVICE — BLADE SURG SAW STD S STL OSC W/ SERR EDGE DISP

## (undated) DEVICE — LAMINECTOMY RICHMOND-LF: Brand: MEDLINE INDUSTRIES, INC.

## (undated) DEVICE — SUTURE STRATAFIX SPRL SZ 1 L14IN ABSRB VLT L48CM CTX 1/2 SXPD2B405

## (undated) DEVICE — SYRINGE MED 30ML STD CLR PLAS LUERLOCK TIP N CTRL DISP

## (undated) DEVICE — JACKSON TABLE POSITIONER KIT: Brand: MEDLINE INDUSTRIES, INC.

## (undated) DEVICE — INTENDED FOR TISSUE SEPARATION, AND OTHER PROCEDURES THAT REQUIRE A SHARP SURGICAL BLADE TO PUNCTURE OR CUT.: Brand: BARD-PARKER ® CARBON RIB-BACK BLADES

## (undated) DEVICE — DRAPE XR C ARM 41X74IN LF --

## (undated) DEVICE — RUBBERBAND FASTENING W0.25XL3.5IN 5 PER PK

## (undated) DEVICE — PIN BNE 3.2X140MM STRL --

## (undated) DEVICE — SUTURE MCRYL SZ 3-0 L27IN ABSRB UD L24MM PS-1 3/8 CIR PRIM Y936H

## (undated) DEVICE — DUAL IRRIGATION ADAPTOR

## (undated) DEVICE — DECANTER BAG 9": Brand: MEDLINE INDUSTRIES, INC.

## (undated) DEVICE — ACCY PA100-A LEGEND LUB/DIFFUSER 4 PACK: Brand: MIDAS REX®

## (undated) DEVICE — TOOL 14MH30 LEGEND 14CM 3MM: Brand: MIDAS REX ™

## (undated) DEVICE — SUTURE VCRL SZ 2-0 L27IN ABSRB UD L26MM CT-2 1/2 CIR J269H

## (undated) DEVICE — SUTURE VCRL SZ 0 L36IN ABSRB UD L36MM CT-1 1/2 CIR J946H

## (undated) DEVICE — TOTAL JOINT-SFMC: Brand: MEDLINE INDUSTRIES, INC.

## (undated) DEVICE — TAPE,CLOTH/SILK,CURAD,3"X10YD,LF,40/CS: Brand: CURAD

## (undated) DEVICE — COVER LT HNDL PLAS RIG 1 PER PK

## (undated) DEVICE — KIT TRK KNEE PROC VIZADISC

## (undated) DEVICE — ALCOHOL RUBBING ISO 16OZ 70%

## (undated) DEVICE — DRAPE,REIN 53X77,STERILE: Brand: MEDLINE

## (undated) DEVICE — SPONGE LAP 18X18IN STRL -- 5/PK

## (undated) DEVICE — Device

## (undated) DEVICE — KIT EVAC 0.13IN RECT TB DIA10FR 400CC PVC 3 SPR Y CONN DRN

## (undated) DEVICE — TOTAL TRAY, 16FR 10ML SIL FOLEY, URN: Brand: MEDLINE

## (undated) DEVICE — FLOSEAL HEMOSTATIC MATRIX, 10ML: Brand: FLOSEAL HEMOSTATIC MATRIX

## (undated) DEVICE — NEEDLE HYPO 18GA L1.5IN PNK S STL HUB POLYPR SHLD REG BVL

## (undated) DEVICE — SYR 10ML LUER LOK 1/5ML GRAD --

## (undated) DEVICE — SYR LR LCK 1ML GRAD NSAF 30ML --

## (undated) DEVICE — PIN BONE 3.2 X 140MM

## (undated) DEVICE — PENCIL SMK EVAC ALL IN 1 DSGN ENH VISIBILITY IMPROVED AIR

## (undated) DEVICE — SUTURE VCRL SZ 1 L36IN ABSRB UD L36MM CT-1 1/2 CIR J947H

## (undated) DEVICE — SOLUTION IV 1000ML 0.9% SOD CHL PH 5 INJ USP VIAFLX PLAS

## (undated) DEVICE — DRAPE,EXTREMITY,89X128,STERILE: Brand: MEDLINE

## (undated) DEVICE — DRAPE,UTILITY,TAPE,15X26,STERILE: Brand: MEDLINE

## (undated) DEVICE — SOLUTION IRRIG 1000ML STRL H2O USP PLAS POUR BTL

## (undated) DEVICE — KIT INT FIX FEM TIB CKPT MAKOPLASTY

## (undated) DEVICE — AEGIS 1" DISK 4MM HOLE, PEEL OPEN: Brand: MEDLINE

## (undated) DEVICE — OPTIFOAM GENTLE SA, POSTOP, 4X10: Brand: MEDLINE

## (undated) DEVICE — PREP SKN CHLRAPRP APL 26ML STR --

## (undated) DEVICE — CATHETER IV 14GA L1.25IN TEF STR HUB INTROCAN SFTY

## (undated) DEVICE — CODMAN® SURGICAL PATTIES 3/4" X 3/4" (1.91CM X 1.91CM): Brand: CODMAN®

## (undated) DEVICE — NDL SPNE QNCKE 18GX3.5IN LF --

## (undated) DEVICE — BIPOLAR FORCEPS CORD: Brand: VALLEYLAB